# Patient Record
Sex: MALE | Race: WHITE | NOT HISPANIC OR LATINO | URBAN - METROPOLITAN AREA
[De-identification: names, ages, dates, MRNs, and addresses within clinical notes are randomized per-mention and may not be internally consistent; named-entity substitution may affect disease eponyms.]

---

## 2024-10-29 ENCOUNTER — EMERGENCY (EMERGENCY)
Facility: HOSPITAL | Age: 39
LOS: 0 days | Discharge: ANOTHER HOSPITAL | End: 2024-10-29
Attending: STUDENT IN AN ORGANIZED HEALTH CARE EDUCATION/TRAINING PROGRAM
Payer: COMMERCIAL

## 2024-10-29 ENCOUNTER — INPATIENT (INPATIENT)
Facility: HOSPITAL | Age: 39
LOS: 12 days | Discharge: NURSING, REHAB OR LONG-TERM CARE | DRG: 998 | End: 2024-11-11
Attending: THORACIC SURGERY (CARDIOTHORACIC VASCULAR SURGERY) | Admitting: THORACIC SURGERY (CARDIOTHORACIC VASCULAR SURGERY)
Payer: COMMERCIAL

## 2024-10-29 ENCOUNTER — NON-APPOINTMENT (OUTPATIENT)
Age: 39
End: 2024-10-29

## 2024-10-29 ENCOUNTER — APPOINTMENT (OUTPATIENT)
Dept: CARDIOTHORACIC SURGERY | Facility: HOSPITAL | Age: 39
End: 2024-10-29

## 2024-10-29 ENCOUNTER — RESULT REVIEW (OUTPATIENT)
Age: 39
End: 2024-10-29

## 2024-10-29 VITALS
DIASTOLIC BLOOD PRESSURE: 60 MMHG | SYSTOLIC BLOOD PRESSURE: 128 MMHG | RESPIRATION RATE: 18 BRPM | HEART RATE: 94 BPM | OXYGEN SATURATION: 93 %

## 2024-10-29 VITALS
HEART RATE: 96 BPM | SYSTOLIC BLOOD PRESSURE: 177 MMHG | RESPIRATION RATE: 18 BRPM | TEMPERATURE: 99 F | OXYGEN SATURATION: 99 % | DIASTOLIC BLOOD PRESSURE: 63 MMHG

## 2024-10-29 VITALS — HEART RATE: 72 BPM

## 2024-10-29 DIAGNOSIS — I71.00 DISSECTION OF UNSPECIFIED SITE OF AORTA: ICD-10-CM

## 2024-10-29 DIAGNOSIS — I71.0 DISSECTION OF AORTA: ICD-10-CM

## 2024-10-29 DIAGNOSIS — R07.89 OTHER CHEST PAIN: ICD-10-CM

## 2024-10-29 LAB
ABO RH CONFIRMATION: SIGNIFICANT CHANGE UP
ALBUMIN SERPL ELPH-MCNC: 3.7 G/DL — SIGNIFICANT CHANGE UP (ref 3.3–5)
ALP SERPL-CCNC: 91 U/L — SIGNIFICANT CHANGE UP (ref 40–120)
ALT FLD-CCNC: 49 U/L — SIGNIFICANT CHANGE UP (ref 12–78)
ANION GAP SERPL CALC-SCNC: 9 MMOL/L — SIGNIFICANT CHANGE UP (ref 5–17)
APTT BLD: 32.3 SEC — SIGNIFICANT CHANGE UP (ref 24.5–35.6)
AST SERPL-CCNC: 25 U/L — SIGNIFICANT CHANGE UP (ref 15–37)
BASE EXCESS BLDA CALC-SCNC: -1.6 MMOL/L — SIGNIFICANT CHANGE UP (ref -2–3)
BASE EXCESS BLDA CALC-SCNC: -1.8 MMOL/L — SIGNIFICANT CHANGE UP (ref -2–3)
BASE EXCESS BLDA CALC-SCNC: -2.2 MMOL/L — LOW (ref -2–3)
BASE EXCESS BLDA CALC-SCNC: -3.7 MMOL/L — LOW (ref -2–3)
BASE EXCESS BLDA CALC-SCNC: -4.4 MMOL/L — LOW (ref -2–3)
BASE EXCESS BLDA CALC-SCNC: -5.1 MMOL/L — LOW (ref -2–3)
BASE EXCESS BLDA CALC-SCNC: -5.9 MMOL/L — LOW (ref -2–3)
BASE EXCESS BLDA CALC-SCNC: -6.5 MMOL/L — LOW (ref -2–3)
BASE EXCESS BLDA CALC-SCNC: -6.6 MMOL/L — LOW (ref -2–3)
BASE EXCESS BLDA CALC-SCNC: -8.3 MMOL/L — LOW (ref -2–3)
BASE EXCESS BLDA CALC-SCNC: -8.8 MMOL/L — LOW (ref -2–3)
BASE EXCESS BLDA CALC-SCNC: -9.6 MMOL/L — LOW (ref -2–3)
BASE EXCESS BLDV CALC-SCNC: -2 MMOL/L — SIGNIFICANT CHANGE UP (ref -2–3)
BASE EXCESS BLDV CALC-SCNC: -3.8 MMOL/L — LOW (ref -2–3)
BASE EXCESS BLDV CALC-SCNC: -4 MMOL/L — LOW (ref -2–3)
BASE EXCESS BLDV CALC-SCNC: -5.3 MMOL/L — LOW (ref -2–3)
BASE EXCESS BLDV CALC-SCNC: -5.9 MMOL/L — LOW (ref -2–3)
BASE EXCESS BLDV CALC-SCNC: -7.3 MMOL/L — LOW (ref -2–3)
BASE EXCESS BLDV CALC-SCNC: -8.1 MMOL/L — LOW (ref -2–3)
BASE EXCESS BLDV CALC-SCNC: -9.2 MMOL/L — LOW (ref -2–3)
BASOPHILS # BLD AUTO: 0 K/UL — SIGNIFICANT CHANGE UP (ref 0–0.2)
BASOPHILS NFR BLD AUTO: 0 % — SIGNIFICANT CHANGE UP (ref 0–2)
BILIRUB SERPL-MCNC: 0.7 MG/DL — SIGNIFICANT CHANGE UP (ref 0.2–1.2)
BLD GP AB SCN SERPL QL: SIGNIFICANT CHANGE UP
BLD GP AB SCN SERPL QL: SIGNIFICANT CHANGE UP
BUN SERPL-MCNC: 22 MG/DL — SIGNIFICANT CHANGE UP (ref 7–23)
CA-I BLDA-SCNC: 0.71 MMOL/L — LOW (ref 1.15–1.33)
CA-I BLDA-SCNC: 0.81 MMOL/L — LOW (ref 1.15–1.33)
CA-I BLDA-SCNC: 0.82 MMOL/L — LOW (ref 1.15–1.33)
CA-I BLDA-SCNC: 0.93 MMOL/L — LOW (ref 1.15–1.33)
CA-I BLDA-SCNC: 0.97 MMOL/L — LOW (ref 1.15–1.33)
CA-I BLDA-SCNC: 0.98 MMOL/L — LOW (ref 1.15–1.33)
CA-I BLDA-SCNC: 0.98 MMOL/L — LOW (ref 1.15–1.33)
CA-I BLDA-SCNC: 1 MMOL/L — LOW (ref 1.15–1.33)
CA-I BLDA-SCNC: 1.03 MMOL/L — LOW (ref 1.15–1.33)
CA-I BLDA-SCNC: 1.11 MMOL/L — LOW (ref 1.15–1.33)
CA-I BLDA-SCNC: 1.17 MMOL/L — SIGNIFICANT CHANGE UP (ref 1.15–1.33)
CA-I BLDA-SCNC: 1.45 MMOL/L — HIGH (ref 1.15–1.33)
CA-I SERPL-SCNC: 0.72 MMOL/L — LOW (ref 1.15–1.33)
CA-I SERPL-SCNC: 0.83 MMOL/L — LOW (ref 1.15–1.33)
CA-I SERPL-SCNC: 0.85 MMOL/L — LOW (ref 1.15–1.33)
CA-I SERPL-SCNC: 0.91 MMOL/L — LOW (ref 1.15–1.33)
CA-I SERPL-SCNC: 0.96 MMOL/L — LOW (ref 1.15–1.33)
CA-I SERPL-SCNC: 0.96 MMOL/L — LOW (ref 1.15–1.33)
CA-I SERPL-SCNC: 0.98 MMOL/L — LOW (ref 1.15–1.33)
CA-I SERPL-SCNC: 0.99 MMOL/L — LOW (ref 1.15–1.33)
CALCIUM SERPL-MCNC: 9 MG/DL — SIGNIFICANT CHANGE UP (ref 8.5–10.1)
CHLORIDE BLDA-SCNC: 101 MMOL/L — SIGNIFICANT CHANGE UP (ref 96–108)
CHLORIDE BLDA-SCNC: 102 MMOL/L — SIGNIFICANT CHANGE UP (ref 96–108)
CHLORIDE BLDA-SCNC: 103 MMOL/L — SIGNIFICANT CHANGE UP (ref 96–108)
CHLORIDE BLDA-SCNC: 103 MMOL/L — SIGNIFICANT CHANGE UP (ref 96–108)
CHLORIDE BLDA-SCNC: 104 MMOL/L — SIGNIFICANT CHANGE UP (ref 96–108)
CHLORIDE BLDA-SCNC: 104 MMOL/L — SIGNIFICANT CHANGE UP (ref 96–108)
CHLORIDE BLDA-SCNC: 107 MMOL/L — SIGNIFICANT CHANGE UP (ref 96–108)
CHLORIDE BLDA-SCNC: 109 MMOL/L — HIGH (ref 96–108)
CHLORIDE BLDA-SCNC: 110 MMOL/L — HIGH (ref 96–108)
CHLORIDE BLDA-SCNC: 112 MMOL/L — HIGH (ref 96–108)
CHLORIDE BLDV-SCNC: 102 MMOL/L — SIGNIFICANT CHANGE UP (ref 96–108)
CHLORIDE BLDV-SCNC: 103 MMOL/L — SIGNIFICANT CHANGE UP (ref 96–108)
CHLORIDE BLDV-SCNC: 106 MMOL/L — SIGNIFICANT CHANGE UP (ref 96–108)
CHLORIDE BLDV-SCNC: 107 MMOL/L — SIGNIFICANT CHANGE UP (ref 96–108)
CHLORIDE BLDV-SCNC: 109 MMOL/L — HIGH (ref 96–108)
CHLORIDE BLDV-SCNC: 109 MMOL/L — HIGH (ref 96–108)
CHLORIDE SERPL-SCNC: 106 MMOL/L — SIGNIFICANT CHANGE UP (ref 96–108)
CO2 SERPL-SCNC: 23 MMOL/L — SIGNIFICANT CHANGE UP (ref 22–31)
COHGB MFR BLDA: 1.4 % — SIGNIFICANT CHANGE UP
COHGB MFR BLDA: 1.5 % — SIGNIFICANT CHANGE UP
COHGB MFR BLDA: 1.6 % — SIGNIFICANT CHANGE UP
COHGB MFR BLDA: 1.7 % — SIGNIFICANT CHANGE UP
COHGB MFR BLDA: 1.7 % — SIGNIFICANT CHANGE UP
COHGB MFR BLDA: 1.8 % — SIGNIFICANT CHANGE UP
COHGB MFR BLDA: 1.9 % — SIGNIFICANT CHANGE UP
COHGB MFR BLDV: 1.5 % — SIGNIFICANT CHANGE UP
COHGB MFR BLDV: 1.5 % — SIGNIFICANT CHANGE UP
COHGB MFR BLDV: 1.7 % — SIGNIFICANT CHANGE UP
COHGB MFR BLDV: 1.8 % — SIGNIFICANT CHANGE UP
COHGB MFR BLDV: 1.8 % — SIGNIFICANT CHANGE UP
COHGB MFR BLDV: 1.9 % — SIGNIFICANT CHANGE UP
COHGB MFR BLDV: 2 % — SIGNIFICANT CHANGE UP
COHGB MFR BLDV: 2 % — SIGNIFICANT CHANGE UP
CREAT SERPL-MCNC: 1.75 MG/DL — HIGH (ref 0.5–1.3)
EGFR: 51 ML/MIN/1.73M2 — LOW
EOSINOPHIL # BLD AUTO: 0 K/UL — SIGNIFICANT CHANGE UP (ref 0–0.5)
EOSINOPHIL NFR BLD AUTO: 0 % — SIGNIFICANT CHANGE UP (ref 0–6)
GAS PNL BLDA: SIGNIFICANT CHANGE UP
GAS PNL BLDV: 131 MMOL/L — LOW (ref 136–145)
GAS PNL BLDV: 132 MMOL/L — LOW (ref 136–145)
GAS PNL BLDV: 133 MMOL/L — LOW (ref 136–145)
GAS PNL BLDV: 134 MMOL/L — LOW (ref 136–145)
GAS PNL BLDV: 135 MMOL/L — LOW (ref 136–145)
GAS PNL BLDV: 138 MMOL/L — SIGNIFICANT CHANGE UP (ref 136–145)
GAS PNL BLDV: 141 MMOL/L — SIGNIFICANT CHANGE UP (ref 136–145)
GAS PNL BLDV: 142 MMOL/L — SIGNIFICANT CHANGE UP (ref 136–145)
GLUCOSE BLDA-MCNC: 118 MG/DL — HIGH (ref 70–99)
GLUCOSE BLDA-MCNC: 131 MG/DL — HIGH (ref 70–99)
GLUCOSE BLDA-MCNC: 133 MG/DL — HIGH (ref 70–99)
GLUCOSE BLDA-MCNC: 136 MG/DL — HIGH (ref 70–99)
GLUCOSE BLDA-MCNC: 145 MG/DL — HIGH (ref 70–99)
GLUCOSE BLDA-MCNC: 159 MG/DL — HIGH (ref 70–99)
GLUCOSE BLDA-MCNC: 162 MG/DL — HIGH (ref 70–99)
GLUCOSE BLDA-MCNC: 178 MG/DL — HIGH (ref 70–99)
GLUCOSE BLDA-MCNC: 178 MG/DL — HIGH (ref 70–99)
GLUCOSE BLDA-MCNC: 186 MG/DL — HIGH (ref 70–99)
GLUCOSE BLDA-MCNC: 194 MG/DL — HIGH (ref 70–99)
GLUCOSE BLDA-MCNC: 197 MG/DL — HIGH (ref 70–99)
GLUCOSE BLDV-MCNC: 131 MG/DL — HIGH (ref 70–99)
GLUCOSE BLDV-MCNC: 134 MG/DL — HIGH (ref 70–99)
GLUCOSE BLDV-MCNC: 140 MG/DL — HIGH (ref 70–99)
GLUCOSE BLDV-MCNC: 160 MG/DL — HIGH (ref 70–99)
GLUCOSE BLDV-MCNC: 168 MG/DL — HIGH (ref 70–99)
GLUCOSE BLDV-MCNC: 180 MG/DL — HIGH (ref 70–99)
GLUCOSE BLDV-MCNC: 181 MG/DL — HIGH (ref 70–99)
GLUCOSE BLDV-MCNC: 188 MG/DL — HIGH (ref 70–99)
GLUCOSE SERPL-MCNC: 120 MG/DL — HIGH (ref 70–99)
HCO3 BLDA-SCNC: 18 MMOL/L — LOW (ref 21–28)
HCO3 BLDA-SCNC: 18 MMOL/L — LOW (ref 21–28)
HCO3 BLDA-SCNC: 19 MMOL/L — LOW (ref 21–28)
HCO3 BLDA-SCNC: 20 MMOL/L — LOW (ref 21–28)
HCO3 BLDA-SCNC: 20 MMOL/L — LOW (ref 21–28)
HCO3 BLDA-SCNC: 21 MMOL/L — SIGNIFICANT CHANGE UP (ref 21–28)
HCO3 BLDA-SCNC: 22 MMOL/L — SIGNIFICANT CHANGE UP (ref 21–28)
HCO3 BLDA-SCNC: 22 MMOL/L — SIGNIFICANT CHANGE UP (ref 21–28)
HCO3 BLDA-SCNC: 23 MMOL/L — SIGNIFICANT CHANGE UP (ref 21–28)
HCO3 BLDA-SCNC: 23 MMOL/L — SIGNIFICANT CHANGE UP (ref 21–28)
HCO3 BLDA-SCNC: 25 MMOL/L — SIGNIFICANT CHANGE UP (ref 21–28)
HCO3 BLDA-SCNC: 25 MMOL/L — SIGNIFICANT CHANGE UP (ref 21–28)
HCO3 BLDV-SCNC: 19 MMOL/L — LOW (ref 22–29)
HCO3 BLDV-SCNC: 19 MMOL/L — LOW (ref 22–29)
HCO3 BLDV-SCNC: 20 MMOL/L — LOW (ref 22–29)
HCO3 BLDV-SCNC: 21 MMOL/L — LOW (ref 22–29)
HCO3 BLDV-SCNC: 22 MMOL/L — SIGNIFICANT CHANGE UP (ref 22–29)
HCO3 BLDV-SCNC: 23 MMOL/L — SIGNIFICANT CHANGE UP (ref 22–29)
HCO3 BLDV-SCNC: 24 MMOL/L — SIGNIFICANT CHANGE UP (ref 22–29)
HCO3 BLDV-SCNC: 25 MMOL/L — SIGNIFICANT CHANGE UP (ref 22–29)
HCT VFR BLD CALC: 34.9 % — LOW (ref 39–50)
HCT VFR BLD CALC: 43.2 % — SIGNIFICANT CHANGE UP (ref 39–50)
HCT VFR BLDA CALC: 29 % — SIGNIFICANT CHANGE UP
HCT VFR BLDA CALC: 30 % — SIGNIFICANT CHANGE UP
HCT VFR BLDA CALC: 32 % — SIGNIFICANT CHANGE UP
HCT VFR BLDA CALC: 33 % — SIGNIFICANT CHANGE UP
HCT VFR BLDA CALC: 33 % — SIGNIFICANT CHANGE UP
HCT VFR BLDA CALC: 34 % — SIGNIFICANT CHANGE UP
HCT VFR BLDA CALC: 35 % — SIGNIFICANT CHANGE UP
HCT VFR BLDA CALC: 39 % — SIGNIFICANT CHANGE UP
HCT VFR BLDA CALC: 44 % — SIGNIFICANT CHANGE UP
HGB BLD CALC-MCNC: 10.7 G/DL — LOW (ref 12.6–17.4)
HGB BLD CALC-MCNC: 10.9 G/DL — LOW (ref 12.6–17.4)
HGB BLD CALC-MCNC: 11 G/DL — LOW (ref 12.6–17.4)
HGB BLD CALC-MCNC: 11.2 G/DL — LOW (ref 12.6–17.4)
HGB BLD CALC-MCNC: 11.4 G/DL — LOW (ref 12.6–17.4)
HGB BLD CALC-MCNC: 11.5 G/DL — LOW (ref 12.6–17.4)
HGB BLD CALC-MCNC: 11.6 G/DL — LOW (ref 12.6–17.4)
HGB BLD CALC-MCNC: 11.8 G/DL — LOW (ref 12.6–17.4)
HGB BLD-MCNC: 11.9 G/DL — LOW (ref 13–17)
HGB BLD-MCNC: 14.5 G/DL — SIGNIFICANT CHANGE UP (ref 13–17)
HGB BLDA-MCNC: 10 G/DL — LOW (ref 12.6–17.4)
HGB BLDA-MCNC: 10.6 G/DL — LOW (ref 12.6–17.4)
HGB BLDA-MCNC: 10.7 G/DL — LOW (ref 12.6–17.4)
HGB BLDA-MCNC: 10.8 G/DL — LOW (ref 12.6–17.4)
HGB BLDA-MCNC: 11.3 G/DL — LOW (ref 12.6–17.4)
HGB BLDA-MCNC: 11.4 G/DL — LOW (ref 12.6–17.4)
HGB BLDA-MCNC: 11.6 G/DL — LOW (ref 12.6–17.4)
HGB BLDA-MCNC: 11.6 G/DL — LOW (ref 12.6–17.4)
HGB BLDA-MCNC: 11.7 G/DL — LOW (ref 12.6–17.4)
HGB BLDA-MCNC: 13 G/DL — SIGNIFICANT CHANGE UP (ref 12.6–17.4)
HGB BLDA-MCNC: 14.5 G/DL — SIGNIFICANT CHANGE UP (ref 12.6–17.4)
HGB BLDA-MCNC: 9.6 G/DL — LOW (ref 12.6–17.4)
INR BLD: 0.91 RATIO — SIGNIFICANT CHANGE UP (ref 0.85–1.16)
LACTATE BLDA-MCNC: 2.8 MMOL/L — HIGH (ref 0.5–2)
LACTATE BLDA-MCNC: 2.8 MMOL/L — HIGH (ref 0.5–2)
LACTATE BLDA-MCNC: 3.1 MMOL/L — HIGH (ref 0.5–2)
LACTATE BLDA-MCNC: 3.6 MMOL/L — HIGH (ref 0.5–2)
LACTATE BLDA-MCNC: 3.8 MMOL/L — HIGH (ref 0.5–2)
LACTATE BLDA-MCNC: 4.1 MMOL/L — CRITICAL HIGH (ref 0.5–2)
LACTATE BLDA-MCNC: 4.4 MMOL/L — CRITICAL HIGH (ref 0.5–2)
LACTATE BLDA-MCNC: 4.6 MMOL/L — CRITICAL HIGH (ref 0.5–2)
LACTATE BLDA-MCNC: 5.1 MMOL/L — CRITICAL HIGH (ref 0.5–2)
LACTATE BLDA-MCNC: 5.1 MMOL/L — CRITICAL HIGH (ref 0.5–2)
LACTATE BLDA-MCNC: 5.8 MMOL/L — CRITICAL HIGH (ref 0.5–2)
LACTATE BLDA-MCNC: 6.4 MMOL/L — CRITICAL HIGH (ref 0.5–2)
LACTATE BLDV-MCNC: 2.8 MMOL/L — HIGH (ref 0.5–2)
LACTATE BLDV-MCNC: 3.8 MMOL/L — HIGH (ref 0.5–2)
LACTATE BLDV-MCNC: 3.9 MMOL/L — HIGH (ref 0.5–2)
LACTATE BLDV-MCNC: 4.1 MMOL/L — CRITICAL HIGH (ref 0.5–2)
LACTATE BLDV-MCNC: 4.4 MMOL/L — CRITICAL HIGH (ref 0.5–2)
LACTATE BLDV-MCNC: 4.4 MMOL/L — CRITICAL HIGH (ref 0.5–2)
LACTATE BLDV-MCNC: 5.1 MMOL/L — CRITICAL HIGH (ref 0.5–2)
LACTATE BLDV-MCNC: 5.2 MMOL/L — CRITICAL HIGH (ref 0.5–2)
LYMPHOCYTES # BLD AUTO: 1.84 K/UL — SIGNIFICANT CHANGE UP (ref 1–3.3)
LYMPHOCYTES # BLD AUTO: 9 % — LOW (ref 13–44)
MANUAL SMEAR VERIFICATION: SIGNIFICANT CHANGE UP
MCHC RBC-ENTMCNC: 28.4 PG — SIGNIFICANT CHANGE UP (ref 27–34)
MCHC RBC-ENTMCNC: 28.7 PG — SIGNIFICANT CHANGE UP (ref 27–34)
MCHC RBC-ENTMCNC: 33.6 GM/DL — SIGNIFICANT CHANGE UP (ref 32–36)
MCHC RBC-ENTMCNC: 34.1 G/DL — SIGNIFICANT CHANGE UP (ref 32–36)
MCV RBC AUTO: 83.3 FL — SIGNIFICANT CHANGE UP (ref 80–100)
MCV RBC AUTO: 85.4 FL — SIGNIFICANT CHANGE UP (ref 80–100)
METHGB MFR BLDA: 0.5 % — SIGNIFICANT CHANGE UP
METHGB MFR BLDA: 0.5 % — SIGNIFICANT CHANGE UP
METHGB MFR BLDA: 0.6 % — SIGNIFICANT CHANGE UP
METHGB MFR BLDA: 0.6 % — SIGNIFICANT CHANGE UP
METHGB MFR BLDA: 0.7 % — SIGNIFICANT CHANGE UP
METHGB MFR BLDA: 0.8 % — SIGNIFICANT CHANGE UP
METHGB MFR BLDA: 0.8 % — SIGNIFICANT CHANGE UP
METHGB MFR BLDA: 0.9 % — SIGNIFICANT CHANGE UP
METHGB MFR BLDA: 1 % — SIGNIFICANT CHANGE UP
METHGB MFR BLDA: 1.4 % — SIGNIFICANT CHANGE UP
METHGB MFR BLDV: 0.6 % — SIGNIFICANT CHANGE UP
METHGB MFR BLDV: 0.6 % — SIGNIFICANT CHANGE UP
METHGB MFR BLDV: 0.8 % — SIGNIFICANT CHANGE UP
METHGB MFR BLDV: 0.9 % — SIGNIFICANT CHANGE UP
METHGB MFR BLDV: 1 % — SIGNIFICANT CHANGE UP
METHGB MFR BLDV: 1 % — SIGNIFICANT CHANGE UP
MONOCYTES # BLD AUTO: 2.46 K/UL — HIGH (ref 0–0.9)
MONOCYTES NFR BLD AUTO: 12 % — SIGNIFICANT CHANGE UP (ref 2–14)
NEUTROPHILS # BLD AUTO: 15.75 K/UL — HIGH (ref 1.8–7.4)
NEUTROPHILS NFR BLD AUTO: 77 % — SIGNIFICANT CHANGE UP (ref 43–77)
NRBC # BLD: 0 /100 WBCS — SIGNIFICANT CHANGE UP (ref 0–0)
NRBC # BLD: SIGNIFICANT CHANGE UP /100 WBCS (ref 0–0)
OXYHGB MFR BLDA: 97 % — HIGH (ref 90–95)
OXYHGB MFR BLDA: 98 % — HIGH (ref 90–95)
PCO2 BLDA: 37 MMHG — SIGNIFICANT CHANGE UP (ref 35–48)
PCO2 BLDA: 38 MMHG — SIGNIFICANT CHANGE UP (ref 35–48)
PCO2 BLDA: 38 MMHG — SIGNIFICANT CHANGE UP (ref 35–48)
PCO2 BLDA: 41 MMHG — SIGNIFICANT CHANGE UP (ref 35–48)
PCO2 BLDA: 43 MMHG — SIGNIFICANT CHANGE UP (ref 35–48)
PCO2 BLDA: 43 MMHG — SIGNIFICANT CHANGE UP (ref 35–48)
PCO2 BLDA: 46 MMHG — SIGNIFICANT CHANGE UP (ref 35–48)
PCO2 BLDA: 47 MMHG — SIGNIFICANT CHANGE UP (ref 35–48)
PCO2 BLDA: 49 MMHG — HIGH (ref 35–48)
PCO2 BLDA: 49 MMHG — HIGH (ref 35–48)
PCO2 BLDA: 52 MMHG — HIGH (ref 35–48)
PCO2 BLDA: 53 MMHG — HIGH (ref 35–48)
PCO2 BLDV: 41 MMHG — LOW (ref 42–55)
PCO2 BLDV: 43 MMHG — SIGNIFICANT CHANGE UP (ref 42–55)
PCO2 BLDV: 47 MMHG — SIGNIFICANT CHANGE UP (ref 42–55)
PCO2 BLDV: 48 MMHG — SIGNIFICANT CHANGE UP (ref 42–55)
PCO2 BLDV: 50 MMHG — SIGNIFICANT CHANGE UP (ref 42–55)
PCO2 BLDV: 53 MMHG — SIGNIFICANT CHANGE UP (ref 42–55)
PCO2 BLDV: 55 MMHG — SIGNIFICANT CHANGE UP (ref 42–55)
PCO2 BLDV: 56 MMHG — HIGH (ref 42–55)
PH BLDA: 7.23 — LOW (ref 7.35–7.45)
PH BLDA: 7.25 — LOW (ref 7.35–7.45)
PH BLDA: 7.25 — LOW (ref 7.35–7.45)
PH BLDA: 7.27 — LOW (ref 7.35–7.45)
PH BLDA: 7.28 — LOW (ref 7.35–7.45)
PH BLDA: 7.29 — LOW (ref 7.35–7.45)
PH BLDA: 7.29 — LOW (ref 7.35–7.45)
PH BLDA: 7.32 — LOW (ref 7.35–7.45)
PH BLDA: 7.33 — LOW (ref 7.35–7.45)
PH BLDA: 7.36 — SIGNIFICANT CHANGE UP (ref 7.35–7.45)
PH BLDV: 7.2 — LOW (ref 7.32–7.43)
PH BLDV: 7.24 — LOW (ref 7.32–7.43)
PH BLDV: 7.24 — LOW (ref 7.32–7.43)
PH BLDV: 7.25 — LOW (ref 7.32–7.43)
PH BLDV: 7.25 — LOW (ref 7.32–7.43)
PH BLDV: 7.26 — LOW (ref 7.32–7.43)
PH BLDV: 7.27 — LOW (ref 7.32–7.43)
PH BLDV: 7.29 — LOW (ref 7.32–7.43)
PLAT MORPH BLD: NORMAL — SIGNIFICANT CHANGE UP
PLATELET # BLD AUTO: 234 K/UL — SIGNIFICANT CHANGE UP (ref 150–400)
PLATELET # BLD AUTO: 269 K/UL — SIGNIFICANT CHANGE UP (ref 150–400)
PO2 BLDA: 150 MMHG — HIGH (ref 83–108)
PO2 BLDA: 161 MMHG — HIGH (ref 83–108)
PO2 BLDA: 274 MMHG — HIGH (ref 83–108)
PO2 BLDA: 305 MMHG — HIGH (ref 83–108)
PO2 BLDA: 352 MMHG — HIGH (ref 83–108)
PO2 BLDA: 352 MMHG — HIGH (ref 83–108)
PO2 BLDA: 421 MMHG — HIGH (ref 83–108)
PO2 BLDA: 423 MMHG — HIGH (ref 83–108)
PO2 BLDA: >496 MMHG — HIGH (ref 83–108)
PO2 BLDV: 113 MMHG — HIGH (ref 25–45)
PO2 BLDV: 183 MMHG — HIGH (ref 25–45)
PO2 BLDV: 357 MMHG — HIGH (ref 25–45)
PO2 BLDV: 46 MMHG — HIGH (ref 25–45)
PO2 BLDV: 47 MMHG — HIGH (ref 25–45)
PO2 BLDV: 49 MMHG — HIGH (ref 25–45)
PO2 BLDV: 50 MMHG — HIGH (ref 25–45)
PO2 BLDV: 60 MMHG — HIGH (ref 25–45)
POTASSIUM BLDA-SCNC: 4.2 MMOL/L — SIGNIFICANT CHANGE UP (ref 3.5–5.1)
POTASSIUM BLDA-SCNC: 4.2 MMOL/L — SIGNIFICANT CHANGE UP (ref 3.5–5.1)
POTASSIUM BLDA-SCNC: 4.8 MMOL/L — SIGNIFICANT CHANGE UP (ref 3.5–5.1)
POTASSIUM BLDA-SCNC: 5 MMOL/L — SIGNIFICANT CHANGE UP (ref 3.5–5.1)
POTASSIUM BLDA-SCNC: 5.1 MMOL/L — SIGNIFICANT CHANGE UP (ref 3.5–5.1)
POTASSIUM BLDA-SCNC: 5.9 MMOL/L — HIGH (ref 3.5–5.1)
POTASSIUM BLDA-SCNC: 6 MMOL/L — HIGH (ref 3.5–5.1)
POTASSIUM BLDA-SCNC: 6.1 MMOL/L — HIGH (ref 3.5–5.1)
POTASSIUM BLDA-SCNC: 6.3 MMOL/L — CRITICAL HIGH (ref 3.5–5.1)
POTASSIUM BLDA-SCNC: 6.5 MMOL/L — CRITICAL HIGH (ref 3.5–5.1)
POTASSIUM BLDA-SCNC: 6.6 MMOL/L — CRITICAL HIGH (ref 3.5–5.1)
POTASSIUM BLDA-SCNC: 6.9 MMOL/L — CRITICAL HIGH (ref 3.5–5.1)
POTASSIUM BLDV-SCNC: 5 MMOL/L — SIGNIFICANT CHANGE UP (ref 3.5–5.1)
POTASSIUM BLDV-SCNC: 6 MMOL/L — HIGH (ref 3.5–5.1)
POTASSIUM BLDV-SCNC: 6.1 MMOL/L — HIGH (ref 3.5–5.1)
POTASSIUM BLDV-SCNC: 6.1 MMOL/L — HIGH (ref 3.5–5.1)
POTASSIUM BLDV-SCNC: 6.3 MMOL/L — CRITICAL HIGH (ref 3.5–5.1)
POTASSIUM BLDV-SCNC: 6.6 MMOL/L — CRITICAL HIGH (ref 3.5–5.1)
POTASSIUM BLDV-SCNC: 6.7 MMOL/L — CRITICAL HIGH (ref 3.5–5.1)
POTASSIUM BLDV-SCNC: 6.9 MMOL/L — CRITICAL HIGH (ref 3.5–5.1)
POTASSIUM SERPL-MCNC: 3.8 MMOL/L — SIGNIFICANT CHANGE UP (ref 3.5–5.3)
POTASSIUM SERPL-SCNC: 3.8 MMOL/L — SIGNIFICANT CHANGE UP (ref 3.5–5.3)
PROT SERPL-MCNC: 7.4 GM/DL — SIGNIFICANT CHANGE UP (ref 6–8.3)
PROTHROM AB SERPL-ACNC: 10.7 SEC — SIGNIFICANT CHANGE UP (ref 9.9–13.4)
RBC # BLD: 4.19 M/UL — LOW (ref 4.2–5.8)
RBC # BLD: 5.06 M/UL — SIGNIFICANT CHANGE UP (ref 4.2–5.8)
RBC # FLD: 13.9 % — SIGNIFICANT CHANGE UP (ref 10.3–14.5)
RBC # FLD: 14.2 % — SIGNIFICANT CHANGE UP (ref 10.3–14.5)
RBC BLD AUTO: NORMAL — SIGNIFICANT CHANGE UP
SAO2 % BLDA: 100 % — HIGH (ref 94–98)
SAO2 % BLDA: 99.5 % — HIGH (ref 94–98)
SAO2 % BLDA: 99.9 % — HIGH (ref 94–98)
SAO2 % BLDV: 100 % — HIGH (ref 67–88)
SAO2 % BLDV: 100 % — HIGH (ref 67–88)
SAO2 % BLDV: 76.3 % — SIGNIFICANT CHANGE UP (ref 67–88)
SAO2 % BLDV: 79.1 % — SIGNIFICANT CHANGE UP (ref 67–88)
SAO2 % BLDV: 79.4 % — SIGNIFICANT CHANGE UP (ref 67–88)
SAO2 % BLDV: 80.6 % — SIGNIFICANT CHANGE UP (ref 67–88)
SAO2 % BLDV: 90.9 % — HIGH (ref 67–88)
SAO2 % BLDV: 99.9 % — HIGH (ref 67–88)
SODIUM BLDA-SCNC: 131 MMOL/L — LOW (ref 136–145)
SODIUM BLDA-SCNC: 132 MMOL/L — LOW (ref 136–145)
SODIUM BLDA-SCNC: 133 MMOL/L — LOW (ref 136–145)
SODIUM BLDA-SCNC: 133 MMOL/L — LOW (ref 136–145)
SODIUM BLDA-SCNC: 134 MMOL/L — LOW (ref 136–145)
SODIUM BLDA-SCNC: 135 MMOL/L — LOW (ref 136–145)
SODIUM BLDA-SCNC: 137 MMOL/L — SIGNIFICANT CHANGE UP (ref 136–145)
SODIUM BLDA-SCNC: 139 MMOL/L — SIGNIFICANT CHANGE UP (ref 136–145)
SODIUM BLDA-SCNC: 140 MMOL/L — SIGNIFICANT CHANGE UP (ref 136–145)
SODIUM BLDA-SCNC: 141 MMOL/L — SIGNIFICANT CHANGE UP (ref 136–145)
SODIUM BLDA-SCNC: 143 MMOL/L — SIGNIFICANT CHANGE UP (ref 136–145)
SODIUM BLDA-SCNC: 143 MMOL/L — SIGNIFICANT CHANGE UP (ref 136–145)
SODIUM SERPL-SCNC: 138 MMOL/L — SIGNIFICANT CHANGE UP (ref 135–145)
TROPONIN I, HIGH SENSITIVITY RESULT: 463.51 NG/L — HIGH
VARIANT LYMPHS # BLD: 2 % — SIGNIFICANT CHANGE UP (ref 0–6)
WBC # BLD: 20.46 K/UL — HIGH (ref 3.8–10.5)
WBC # BLD: 24.73 K/UL — HIGH (ref 3.8–10.5)
WBC # FLD AUTO: 20.46 K/UL — HIGH (ref 3.8–10.5)
WBC # FLD AUTO: 24.73 K/UL — HIGH (ref 3.8–10.5)

## 2024-10-29 PROCEDURE — 99291 CRITICAL CARE FIRST HOUR: CPT | Mod: 25

## 2024-10-29 PROCEDURE — 71275 CT ANGIOGRAPHY CHEST: CPT | Mod: 26,MC

## 2024-10-29 PROCEDURE — 93010 ELECTROCARDIOGRAM REPORT: CPT

## 2024-10-29 PROCEDURE — 85025 COMPLETE CBC W/AUTO DIFF WBC: CPT

## 2024-10-29 PROCEDURE — 96375 TX/PRO/DX INJ NEW DRUG ADDON: CPT | Mod: XU

## 2024-10-29 PROCEDURE — 36415 COLL VENOUS BLD VENIPUNCTURE: CPT

## 2024-10-29 PROCEDURE — 71275 CT ANGIOGRAPHY CHEST: CPT | Mod: MC

## 2024-10-29 PROCEDURE — 96376 TX/PRO/DX INJ SAME DRUG ADON: CPT | Mod: XU

## 2024-10-29 PROCEDURE — 99291 CRITICAL CARE FIRST HOUR: CPT

## 2024-10-29 PROCEDURE — 93005 ELECTROCARDIOGRAM TRACING: CPT

## 2024-10-29 PROCEDURE — 85610 PROTHROMBIN TIME: CPT

## 2024-10-29 PROCEDURE — 33863 ASCENDING AORTIC GRAFT: CPT

## 2024-10-29 PROCEDURE — 88305 TISSUE EXAM BY PATHOLOGIST: CPT | Mod: 26

## 2024-10-29 PROCEDURE — 74174 CTA ABD&PLVS W/CONTRAST: CPT | Mod: MC

## 2024-10-29 PROCEDURE — 86900 BLOOD TYPING SEROLOGIC ABO: CPT

## 2024-10-29 PROCEDURE — 33863 ASCENDING AORTIC GRAFT: CPT | Mod: AS

## 2024-10-29 PROCEDURE — 74174 CTA ABD&PLVS W/CONTRAST: CPT | Mod: 26,MC

## 2024-10-29 PROCEDURE — 85730 THROMBOPLASTIN TIME PARTIAL: CPT

## 2024-10-29 PROCEDURE — 80053 COMPREHEN METABOLIC PANEL: CPT

## 2024-10-29 PROCEDURE — 71045 X-RAY EXAM CHEST 1 VIEW: CPT | Mod: 26,77

## 2024-10-29 PROCEDURE — 71045 X-RAY EXAM CHEST 1 VIEW: CPT | Mod: 26

## 2024-10-29 PROCEDURE — 96374 THER/PROPH/DIAG INJ IV PUSH: CPT | Mod: XU

## 2024-10-29 PROCEDURE — 93010 ELECTROCARDIOGRAM REPORT: CPT | Mod: 77

## 2024-10-29 PROCEDURE — 71045 X-RAY EXAM CHEST 1 VIEW: CPT

## 2024-10-29 PROCEDURE — 84484 ASSAY OF TROPONIN QUANT: CPT

## 2024-10-29 PROCEDURE — 86850 RBC ANTIBODY SCREEN: CPT

## 2024-10-29 PROCEDURE — 86901 BLOOD TYPING SEROLOGIC RH(D): CPT

## 2024-10-29 DEVICE — CANNULA ARTERIAL OPTISITE 20FR X 3/8" VENTED: Type: IMPLANTABLE DEVICE | Status: FUNCTIONAL

## 2024-10-29 DEVICE — LIGATING CLIPS WECK HORIZON MEDIUM (BLUE) 24: Type: IMPLANTABLE DEVICE | Status: FUNCTIONAL

## 2024-10-29 DEVICE — FELT PTFE 6 X 6": Type: IMPLANTABLE DEVICE | Status: FUNCTIONAL

## 2024-10-29 DEVICE — FLOSEAL WITH RECOTHROM THROMBIN 10ML: Type: IMPLANTABLE DEVICE | Status: FUNCTIONAL

## 2024-10-29 DEVICE — TISSEEL 10ML: Type: IMPLANTABLE DEVICE | Status: FUNCTIONAL

## 2024-10-29 DEVICE — INTRODUCER PERCUTANEOUS INSERTION KIT: Type: IMPLANTABLE DEVICE | Status: FUNCTIONAL

## 2024-10-29 DEVICE — CANNULA ANTEGRADE CARDIOPLEGIA 14 GA STRL: Type: IMPLANTABLE DEVICE | Status: FUNCTIONAL

## 2024-10-29 DEVICE — BONE WAX 2.5GM: Type: IMPLANTABLE DEVICE | Status: FUNCTIONAL

## 2024-10-29 DEVICE — BOVINE CONDUIT VALVE AORTIC KONECT 25MM: Type: IMPLANTABLE DEVICE | Status: FUNCTIONAL

## 2024-10-29 DEVICE — COR-KNOT MINI DEVICE COMBO KIT: Type: IMPLANTABLE DEVICE | Status: FUNCTIONAL

## 2024-10-29 DEVICE — KIT CVC 2LUM MAC 9FR CHG: Type: IMPLANTABLE DEVICE | Status: FUNCTIONAL

## 2024-10-29 DEVICE — PACING WIRE ORANGE M-25 WINGED WIRE 37MM X 89MM: Type: IMPLANTABLE DEVICE | Status: FUNCTIONAL

## 2024-10-29 DEVICE — CANNULA ARTERIAL OPTISITE 16FR X 3/8" VENTED: Type: IMPLANTABLE DEVICE | Status: FUNCTIONAL

## 2024-10-29 DEVICE — KIT A-LINE 1LUM 20G X 12CM SAFE KIT: Type: IMPLANTABLE DEVICE | Status: FUNCTIONAL

## 2024-10-29 DEVICE — SHEATH INTRODUCER TERUMO PINNACLE PERIPHERAL 4FR X 10CM X 0.035" MINI WIRE: Type: IMPLANTABLE DEVICE | Status: FUNCTIONAL

## 2024-10-29 DEVICE — CATH THERMAL OXI SWAN GANZ DILUTION 7.5FR: Type: IMPLANTABLE DEVICE | Status: FUNCTIONAL

## 2024-10-29 DEVICE — CANNULA ARTERIAL SOFT-FLOW 24FR EXTENDED VENTED: Type: IMPLANTABLE DEVICE | Status: FUNCTIONAL

## 2024-10-29 DEVICE — CATH VENT VENTRICULAR PVC 18FR X 4.25" TIP PERFORATION: Type: IMPLANTABLE DEVICE | Status: FUNCTIONAL

## 2024-10-29 DEVICE — CANNULA VENOUS 2 STAGE OPEN LIGHTHOUSE TIP 32-40FR X 1/2" NON-VENTED: Type: IMPLANTABLE DEVICE | Status: FUNCTIONAL

## 2024-10-29 DEVICE — BIOGLUE 10ML SYR: Type: IMPLANTABLE DEVICE | Status: FUNCTIONAL

## 2024-10-29 DEVICE — CANNULA RETROGRADE CARDIOPLEGIA SELF-INFLATING 14FR PRE-SHAPED STYLET/HANDLE: Type: IMPLANTABLE DEVICE | Status: FUNCTIONAL

## 2024-10-29 DEVICE — COR-KNOT QUICK LOAD SINGLES: Type: IMPLANTABLE DEVICE | Status: FUNCTIONAL

## 2024-10-29 DEVICE — PATCH PERI GUARD 4X4CM: Type: IMPLANTABLE DEVICE | Status: FUNCTIONAL

## 2024-10-29 DEVICE — PACING WIRE WHITE M-25 WINGED WIRE 37MM X 89MM: Type: IMPLANTABLE DEVICE | Status: FUNCTIONAL

## 2024-10-29 RX ORDER — PROPOFOL 10 MG/ML
35 INJECTION, EMULSION INTRAVENOUS
Qty: 1000 | Refills: 0 | Status: DISCONTINUED | OUTPATIENT
Start: 2024-10-29 | End: 2024-10-30

## 2024-10-29 RX ORDER — ESMOLOL HYDROCHLORIDE IN SODIUM CHLORIDE 20 MG/ML
50 INJECTION INTRAVENOUS
Qty: 2500 | Refills: 0 | Status: DISCONTINUED | OUTPATIENT
Start: 2024-10-29 | End: 2024-10-29

## 2024-10-29 RX ORDER — POTASSIUM CHLORIDE 10 MEQ
10 TABLET, EXTENDED RELEASE ORAL
Refills: 0 | Status: DISCONTINUED | OUTPATIENT
Start: 2024-10-29 | End: 2024-11-01

## 2024-10-29 RX ORDER — ESMOLOL HYDROCHLORIDE IN SODIUM CHLORIDE 20 MG/ML
53500 INJECTION INTRAVENOUS ONCE
Refills: 0 | Status: DISCONTINUED | OUTPATIENT
Start: 2024-10-29 | End: 2024-10-29

## 2024-10-29 RX ORDER — ASCORBIC ACID 500 MG
500 TABLET ORAL
Refills: 0 | Status: COMPLETED | OUTPATIENT
Start: 2024-10-29 | End: 2024-11-03

## 2024-10-29 RX ORDER — SODIUM CHLORIDE 9 MG/ML
1000 INJECTION, SOLUTION INTRAMUSCULAR; INTRAVENOUS; SUBCUTANEOUS
Refills: 0 | Status: DISCONTINUED | OUTPATIENT
Start: 2024-10-29 | End: 2024-11-04

## 2024-10-29 RX ORDER — SENNA 187 MG
2 TABLET ORAL AT BEDTIME
Refills: 0 | Status: DISCONTINUED | OUTPATIENT
Start: 2024-10-30 | End: 2024-11-05

## 2024-10-29 RX ORDER — ACETAMINOPHEN 500 MG
1000 TABLET ORAL EVERY 6 HOURS
Refills: 0 | Status: COMPLETED | OUTPATIENT
Start: 2024-10-29 | End: 2024-10-30

## 2024-10-29 RX ORDER — GABAPENTIN 300 MG/1
100 CAPSULE ORAL EVERY 8 HOURS
Refills: 0 | Status: COMPLETED | OUTPATIENT
Start: 2024-10-29 | End: 2024-11-03

## 2024-10-29 RX ORDER — INSULIN REG, HUM S-S BUFF 100/ML
2 VIAL (ML) INJECTION
Qty: 100 | Refills: 0 | Status: DISCONTINUED | OUTPATIENT
Start: 2024-10-29 | End: 2024-10-31

## 2024-10-29 RX ORDER — VASOPRESSIN IN 0.9% SODIUM CHLORIDE 20 [USP'U]/100ML
0.02 INJECTION INTRAVENOUS
Qty: 40 | Refills: 0 | Status: DISCONTINUED | OUTPATIENT
Start: 2024-10-29 | End: 2024-10-30

## 2024-10-29 RX ORDER — MORPHINE SULFATE 30 MG/1
4 TABLET, FILM COATED, EXTENDED RELEASE ORAL ONCE
Refills: 0 | Status: DISCONTINUED | OUTPATIENT
Start: 2024-10-29 | End: 2024-10-29

## 2024-10-29 RX ORDER — HYDROMORPHONE HCL/0.9% NACL/PF 6 MG/30 ML
0.5 PATIENT CONTROLLED ANALGESIA SYRINGE INTRAVENOUS
Refills: 0 | Status: DISCONTINUED | OUTPATIENT
Start: 2024-10-29 | End: 2024-10-30

## 2024-10-29 RX ORDER — OXYCODONE HYDROCHLORIDE 30 MG/1
10 TABLET ORAL EVERY 4 HOURS
Refills: 0 | Status: DISCONTINUED | OUTPATIENT
Start: 2024-10-29 | End: 2024-11-01

## 2024-10-29 RX ORDER — DEXAMETHASONE 1.5 MG 1.5 MG/1
4 TABLET ORAL EVERY 8 HOURS
Refills: 0 | Status: COMPLETED | OUTPATIENT
Start: 2024-10-29 | End: 2024-10-30

## 2024-10-29 RX ORDER — AMIODARONE HCL 200 MG
400 TABLET ORAL
Refills: 0 | Status: DISCONTINUED | OUTPATIENT
Start: 2024-10-29 | End: 2024-10-31

## 2024-10-29 RX ORDER — LABETALOL HYDROCHLORIDE 200 MG/1
10 TABLET, FILM COATED ORAL ONCE
Refills: 0 | Status: COMPLETED | OUTPATIENT
Start: 2024-10-29 | End: 2024-10-29

## 2024-10-29 RX ORDER — NOREPINEPHRINE BITARTRATE 1 MG/ML
0.05 INJECTION, SOLUTION, CONCENTRATE INTRAVENOUS
Qty: 8 | Refills: 0 | Status: DISCONTINUED | OUTPATIENT
Start: 2024-10-29 | End: 2024-10-30

## 2024-10-29 RX ORDER — CALCIUM GLUCONATE 98 MG/ML
2 INJECTION, SOLUTION INTRAVENOUS ONCE
Refills: 0 | Status: COMPLETED | OUTPATIENT
Start: 2024-10-29 | End: 2024-10-29

## 2024-10-29 RX ORDER — ACETAMINOPHEN 500 MG
975 TABLET ORAL EVERY 6 HOURS
Refills: 0 | Status: DISCONTINUED | OUTPATIENT
Start: 2024-11-02 | End: 2024-11-11

## 2024-10-29 RX ORDER — EPINEPHRINE 1 MG/ML
0.02 INJECTION INTRAMUSCULAR; INTRAVENOUS; SUBCUTANEOUS
Qty: 4 | Refills: 0 | Status: DISCONTINUED | OUTPATIENT
Start: 2024-10-29 | End: 2024-10-30

## 2024-10-29 RX ORDER — POLYETHYLENE GLYCOL 3350 17 G/17G
17 POWDER, FOR SOLUTION ORAL DAILY
Refills: 0 | Status: DISCONTINUED | OUTPATIENT
Start: 2024-10-30 | End: 2024-11-05

## 2024-10-29 RX ORDER — ASPIRIN/MAG CARB/ALUMINUM AMIN 325 MG
81 TABLET ORAL DAILY
Refills: 0 | Status: DISCONTINUED | OUTPATIENT
Start: 2024-10-30 | End: 2024-10-30

## 2024-10-29 RX ORDER — CHLORHEXIDINE GLUCONATE 40 MG/ML
15 SOLUTION TOPICAL EVERY 12 HOURS
Refills: 0 | Status: DISCONTINUED | OUTPATIENT
Start: 2024-10-29 | End: 2024-11-02

## 2024-10-29 RX ORDER — DOBUTAMINE HCL IN DEXTROSE 5 % 500MG/250
3 INTRAVENOUS SOLUTION INTRAVENOUS
Qty: 500 | Refills: 0 | Status: DISCONTINUED | OUTPATIENT
Start: 2024-10-29 | End: 2024-10-30

## 2024-10-29 RX ORDER — OXYCODONE HYDROCHLORIDE 30 MG/1
5 TABLET ORAL EVERY 4 HOURS
Refills: 0 | Status: DISCONTINUED | OUTPATIENT
Start: 2024-10-29 | End: 2024-11-01

## 2024-10-29 RX ORDER — NICARDIPINE HCL 25 MG/10ML
5 AMPUL (ML) INTRAVENOUS
Qty: 40 | Refills: 0 | Status: DISCONTINUED | OUTPATIENT
Start: 2024-10-29 | End: 2024-10-29

## 2024-10-29 RX ORDER — ACETAMINOPHEN 500 MG
975 TABLET ORAL EVERY 6 HOURS
Refills: 0 | Status: COMPLETED | OUTPATIENT
Start: 2024-10-30 | End: 2024-11-01

## 2024-10-29 RX ADMIN — ESMOLOL HYDROCHLORIDE IN SODIUM CHLORIDE 32.1 MICROGRAM(S)/KG/MIN: 20 INJECTION INTRAVENOUS at 14:15

## 2024-10-29 RX ADMIN — Medication 25 MG/HR: at 14:00

## 2024-10-29 RX ADMIN — MORPHINE SULFATE 4 MILLIGRAM(S): 30 TABLET, FILM COATED, EXTENDED RELEASE ORAL at 13:23

## 2024-10-29 RX ADMIN — LABETALOL HYDROCHLORIDE 10 MILLIGRAM(S): 200 TABLET, FILM COATED ORAL at 14:05

## 2024-10-29 RX ADMIN — MORPHINE SULFATE 4 MILLIGRAM(S): 30 TABLET, FILM COATED, EXTENDED RELEASE ORAL at 14:52

## 2024-10-29 NOTE — H&P ADULT - ASSESSMENT
Encounter addended by: Beatrice Aponte on: 10/23/2018  3:51 PM<BR>     Actions taken:  activity accessed, Charge Capture section accepted 40 y/o male with no pertinent past medical history presents to  with c/o chest tightness and pain, as per  chart patient described pain as a "squeezing" sensation across chest radiating down to leg. Pt reports that the pain woke him up at around 2am last night10/29 . He reportedly went to Urgent Care thought pain was muscular so was prescribed muscle relaxer. In  ED patient Pt had CTA chest significant for Aortic dissection which is noted from the level of the root of the aorta extending into the ascending, aortic arch, descending thoracic aorta as well as extending into the abdominal aorta and is noted to end in the distal left common iliac artery. The dissection flap is in close proximity to the right coronary artery. The left renal artery is arising from the false lumen. Pt being transferred to [facility] going directly to the OR admitted under Dr. Grimm.    Plan   Pt being transferred via helicopter with plan to go directly to OR for repair of Type A dissection  40 y/o male with no pertinent past medical history presents to  with c/o chest tightness and pain, as per  chart patient described pain as a "squeezing" sensation across chest radiating down to leg. Pt reports that the pain woke him up at around 2am last night10/29 . He reportedly went to Urgent Care thought pain was muscular so was prescribed muscle relaxer. In  ED patient Pt had CTA chest significant for Aortic dissection which is noted from the level of the root of the aorta extending into the ascending, aortic arch, descending thoracic aorta as well as extending into the abdominal aorta and is noted to end in the distal left common iliac artery. The dissection flap is in close proximity to the right coronary artery. The left renal artery is arising from the false lumen. Pt being transferred to [facility] going directly to the OR admitted under Dr. Grimm.

## 2024-10-29 NOTE — ED ADULT NURSE NOTE - OBJECTIVE STATEMENT
Pt A&OX3, presenting to the ER with c/o chest pain and tightness. Pt report the pain woke him out of his sleep around 2am this morning. The pain radiates to his back. The abdominal pain started about 2 hours ago, Went to urgent care, thought it was muscular and gave him muscle relaxer.  Pt denies SOB, nausea, vomiting, urinary symptoms. EKG completed. Pt placed on SPO2 and cradiac monitor. Pt taken to trauma room. Bilateral 18 g IV inserted ACs.

## 2024-10-29 NOTE — BRIEF OPERATIVE NOTE - NSICDXBRIEFPOSTOP_GEN_ALL_CORE_FT
POST-OP DIAGNOSIS:  Dissection of ascending aorta and aortic arch 29-Oct-2024 23:09:28  Papito Landry

## 2024-10-29 NOTE — ED PROVIDER NOTE - PHYSICAL EXAMINATION
GENERAL: A&Ox4, uncomfortable appearing secondary to pain  HEENT: NCAT, EOMI, oral mucosa moist, normal conjunctiva  RESP: CTAB, no respiratory distress, no wheezes/rhonchi/rales, speaking in full sentences  CV: RRR, no murmurs/rubs/gallops  ABDOMEN: soft, non-tender, distended, no guarding, no rebound tenderness  MSK: no visible deformities  NEURO: no focal sensory or motor deficits, CN 2-12 grossly intact  SKIN: warm, normal color, well perfused, no rash  PSYCH: appropriate affect  POCUS: normal RV:LV ratio, lungs a-line predominant

## 2024-10-29 NOTE — H&P ADULT - PROBLEM SELECTOR PLAN 1
Pt transferred via [facility] and taken directly to OR for emergent repair of Type A dissection

## 2024-10-29 NOTE — ED ADULT TRIAGE NOTE - CHIEF COMPLAINT QUOTE
Pt A&OX3, presenting to the ER with c/o chest pain and tightness. Pt report the pain woke him out of his sleep around 2am this morning. The pain radiates to his back. The abdominal pain started about 2 hours ago, Went to urgent care, thought it was muscular and gave him muscle relaxer.  Pt denies SOB, nausea, vomiting, urinary symptoms.   Taken for STAT EKG.

## 2024-10-29 NOTE — ED PROVIDER NOTE - PROGRESS NOTE DETAILS
CTA reviewed, patient w/ DeBakey type 1 dissection. Esmolol and Nicardipine ordered and RN aware. Transfer center contacted for tier 1 transfer.

## 2024-10-29 NOTE — ED ADULT NURSE NOTE - NSFALLHARMRISKINTERV_ED_ALL_ED
Assistance with ambulation/Communicate risk of Fall with Harm to all staff, patient, and family/Provide visual cue: red socks, yellow wristband, yellow gown, etc/Reinforce activity limits and safety measures with patient and family/Bed in lowest position, wheels locked, appropriate side rails in place/Call bell, personal items and telephone in reach/Instruct patient to call for assistance before getting out of bed/chair/stretcher/Non-slip footwear applied when patient is off stretcher/[place] to call system/Physically safe environment - no spills, clutter or unnecessary equipment/Purposeful Proactive Rounding/Room/bathroom lighting operational, light cord in reach

## 2024-10-29 NOTE — ED STATDOCS - PROGRESS NOTE DETAILS
Christina Hill for attending Dr. Larios: 40 y/o male presents to the ED c/o CP. Pt reports last night CP woke up him up. Pt also reports "abd tightness" and pain. Pt was seen at urgent care and was prescribed muscle relaxer. Pt took one dose and Motrin. Pt reports he not urinating as much as he normally would. Denies diarrhea. Nonsmoker. No other complaints at this time. Pt with CP radiating to back, pale and diaphoretic. Will send pt to main ED for further evaluation. Christina Hill for attending Dr. Larios: 40 y/o male presents to the ED c/o CP. Pt reports last night CP woke up him up. Pt also reports "abd tightness" and pain. Pt was seen at urgent care and was prescribed muscle relaxer. Pt took one dose and Motrin. Pt reports he not urinating as much as he normally would. Denies diarrhea. Nonsmoker. No other complaints at this time. Pt with CP radiating to back, pale and diaphoretic. Concern for possible aortic dissection. I ordered scans and spoke with phsyician in main ED  Will send pt to main ED for further evaluation.

## 2024-10-29 NOTE — BRIEF OPERATIVE NOTE - NSICDXBRIEFPROCEDURE_GEN_ALL_CORE_FT
PROCEDURES:  Repair of type A aortic dissection 29-Oct-2024 23:06:59 [place] Procedure with reconstruction of Right COronary artery Papito Landry

## 2024-10-29 NOTE — H&P ADULT - NSICDXNOPASTSURGICALHX_GEN_ALL_CORE
Physical Therapy    Orders received and chart reviewed. Met with pt who declined having PT needs at this time. Stated he is independent with all mobility, is up ad angelique in his room, and his eye s/s have resolved. Will sign off on pt at this time. D/C. No Charge.     Rhoda Fernando, PT, DPT, OMT-C # [id number] <-- Click to add NO significant Past Surgical History

## 2024-10-29 NOTE — ED PROVIDER NOTE - OBJECTIVE STATEMENT
40 y/o male with no pertinent past medical history presents complaining of chest tightness and pain, describes pain as a "squeezing" sensation across chest radiating down to leg. Pt reports that the pain woke him up at around 2am last night. Reportedly went to Urgent Care who thought pain was muscular so was prescribed muscle relaxer.  Pt denies numbness/tingling, other medical problems or medication use, or alcohol/drug use. No other complaints at this time.

## 2024-10-29 NOTE — PROVIDER CONTACT NOTE (CRITICAL VALUE NOTIFICATION) - TEST AND RESULT REPORTED:
Patient Education     Influenza (Child)    Influenza is also called the flu. It is a viral illness that affects the air passages of your lungs. It is different from the common cold. The flu can easily be passed from one to person to another. It may be spread through the air by coughing and sneezing. Or it can be spread by touching the sick person and then touching your own eyes, nose, or mouth. Symptoms of the flu may be mild or severe. They can include extreme tiredness (wanting to stay in bed all day), chills, fevers, muscle aches, soreness with eye movement, headache, and a dry, hacking cough. Your child usually wonât need to take antibiotics, unless he or she has a complication. This might be an ear or sinus infection or pneumonia. Home care  Follow these guidelines when caring for your child at home:  Â· Fluids. Fever increases the amount of water your child loses from his or her body. For babies younger than 3year old, keep giving regular feedings (formula or breast). Â Talk with your childâs healthcare provider to find out how much fluid your baby should be getting. If needed, give an oral rehydration solution. You can buy this at the grocery or drugstore without a prescription. For a childÂ older than 1 year, give him or her more fluids and continue his or her normal diet. If your child is dehydrated, give an oral rehydration. Go back to your childâs normal diet as soon as possible. Â If your child has diarrhea, donât give juice, flavored gelatin water, soft drinks without caffeine, lemonade, fruit drinks, or popsicles. This may make diarrhea worse. Â· Food. If your child doesnât want to eat solid foods, itâs OK for a few days. Make sure your child drinks lots of fluid and has a normal amount of urine. Â· Activity. Keep children with fever at home resting or playing quietly. Encourage your child to take naps. Your child may go back to  or school when the fever is gone for at least 24 hours.  The fever Troponin 463.51 should be gone without giving your child acetaminophen or other medicine to reduce fever. Your child should also be eating well and feeling better. Â· Sleep. Itâs normal for your child to be unable to sleep or be irritable if he or she has the flu. A child who has congestion will sleep best with his or her head and upper body raisedÂ up. Or youÂ can raise the head of the bed frame on a 6-inch block. Â· Cough. Coughing is a normal part of the flu. You can use a cool mist humidifier at the bedside. Donât give over-the-counter cough and cold medicines to children younger than 10years of age, unless the healthcare provider tells you to do so. These medicines donât help ease symptoms. And they can cause serious side effects, especially in babies younger than 3years of age. Donât allow anyone to smoke around your child. Smoke can make the cough worse. Â· Nasal congestion. Use a rubber bulb syringe to suction the nose of a baby. You may put 2 to 3 drops of saltwater (saline) nose drops in each nostril before suctioning. This will help remove secretions. You can buy saline nose drops without a prescription. You can make the drops yourself by adding 1/4 teaspoon table salt to 1 cup of water. Â· Fever. Use acetaminophen to control pain, unless another medicine was prescribed. In infants older than 10months of age, you may use ibuprofen instead of acetaminophen. If your child has chronic liver or kidney disease, talk with your childâs provider before using these medicines. Also talk with the provider if your child has ever had a stomach ulcer or GI bleeding. Donât give aspirin to anyone under 25years of age who is ill with a fever. It may cause severe liver damage. Follow-up care  Follow up with your childâs health care provider, or as advised.   When to seek medical advice  Call your childâs healthcare provider right away if any of these occur:  Â· Your child is younger than 16 weeks old and has a fever of 100.4Â°F (38Â°C) or "higher. Your baby may need to be seen by a healthcare provider. Â· Your child has repeated fevers above 104Â°F (40Â°C) at any age. Â· Your child is younger than 3years old and his or her fever continues for more than 24 hours. Or your child is 3years old or older and his or her fever continues for more than 3 days. Â· Fast breathing. In a child 6 weeks to 2 years, this is more than 45 breaths per minute. In a child 3 to 6 years, this is more than 35 breaths per minute. In a child 7 to 10 years, this is more than 30 breaths per minute. In a child older than 10 years, this is more than Â 25 breaths per minute. Â· Earache, sinus pain, stiff or painful neck, headache, or repeated diarrhea or vomiting  Â· Unusual fussiness, drowsiness, or confusion  Â· Your child doesnât interact with you as he or she normally does  Â· Your child doesnât want to be held  Â· Not drinking enough fluid. This may show as no tears when crying, or ""sunken\"" eyes or dry mouth. It may also be no wet diapers for 8 hours in a baby. Or it may be less urine than usual in older children. Â· RashÂ with fever  Â© 5763-6[address] [employer] [address] [place] [address]. All rights reserved. This information is not intended as a substitute for professional medical care. Always follow your healthcare professional's instructions.            "

## 2024-10-29 NOTE — H&P ADULT - HISTORY OF PRESENT ILLNESS
38 y/o male with no pertinent past medical history presents to  with c/o chest tightness and pain, as per  chart patient described pain as a "squeezing" sensation across chest radiating down to leg. Pt reports that the pain woke him up at around 2am last night10/29 . He reportedly went to Urgent Care thought pain was muscular so was prescribed muscle relaxer. In  ED patient Pt had CTA chest significant for Aortic dissection which is noted from the level of the root of the aorta extending into the ascending, aortic arch, descending thoracic aorta as well as extending into the abdominal aorta and is noted to end in the distal left common iliac artery. The dissection flap is in close proximity to the right coronary artery. The left renal artery is arising from the false lumen. Pt being [facility] going directly to the OR admitted under Dr. Grimm 
Patient is not pregnant (male or female)

## 2024-10-29 NOTE — H&P ADULT - NSHPPHYSICALEXAM_GEN_ALL_CORE
unable to perform 2/2 to clinical condition  patient going emergently to the OR for Type A dissection General: Well appearing, NAD  Neuro: AxO x3, non-focal, GOSS  Cardiac: S1S2, no murmurs  Pulm: CTA b/l, no wheezing or rales  Abdomen: Soft, NT, ND  Peripheral: +DP and radial pulses b/l upper and lower extremities, no peripheral edema

## 2024-10-29 NOTE — BRIEF OPERATIVE NOTE - NSICDXBRIEFPREOP_GEN_ALL_CORE_FT
PRE-OP DIAGNOSIS:  Ascending aortic dissection 29-Oct-2024 23:08:52 Type A Aortic Dissection Papito Landry

## 2024-10-29 NOTE — ED PROVIDER NOTE - CLINICAL SUMMARY MEDICAL DECISION MAKING FREE TEXT BOX
39-year-old male presenting with acute onset of midsternal chest pain woke him up last night, now with pain radiating down the back and into the abdomen.  Appears very uncomfortable, hypertensive and tachycardic.  Will need to rule out aortic aneurysm versus dissection with labs, CTA chest/abdomen/pelvis, pain management, reassess.

## 2024-10-29 NOTE — ED ADULT TRIAGE NOTE - INTERNATIONAL TRAVEL
"CC:  presents with Pharyngitis            Pharyngitis   This is a new problem. The current episode started in the past 3 days. The problem has been unchanged. There has been  Fever, Tmax 101. The pain is mild. Associated symptoms include a dry cough. Pertinent negatives include no abdominal pain,   diarrhea, headaches, shortness of breath or vomiting. no exposure to strep or mono.   has tried acetaminophen for the symptoms. The treatment provided mild relief.       She is not vaccinated for COVID.    She took home covid test yesterday and it was positive.         Social History     Tobacco Use   • Smoking status: Never Smoker   • Smokeless tobacco: Never Used   Substance Use Topics   • Alcohol use: No   • Drug use: No       Past Medical History:   Diagnosis Date   • GERD (gastroesophageal reflux disease)     Acid reflux   • Headache(784.0)     Reg headaches every other day lasting until medication is taken for relief.   • Hypertension     Only during pregnancy   • Migraine 2009    Migraines 1-2 per week lasting 2-3 days since 2009       Review of Systems    HENT: Positive for sore throat  Respiratory: Negative for  sputum production and shortness of breath.    Cardiovascular: Negative for chest pain.   Gastrointestinal: Negative for nausea, vomiting, abdominal pain and diarrhea.   Genitourinary: Negative.    Neurological: Negative for dizziness and headaches.   All other systems reviewed and are negative.         Objective:   /88 (BP Location: Left arm, Patient Position: Sitting, BP Cuff Size: Adult)   Pulse 90   Temp 36.6 °C (97.8 °F) (Temporal)   Resp 16   Ht 1.676 m (5' 6\")   Wt (!) 132 kg (290 lb)   SpO2 96%         Physical Exam   Constitutional:   oriented to person, place, and time.  appears well-developed and well-nourished. No distress.   HENT:   Head: Normocephalic and atraumatic.   Right Ear: External ear normal.   Left Ear: External ear normal.   Nose: Mucosal edema present. Right sinus " exhibits no maxillary sinus tenderness and no frontal sinus tenderness. Left sinus exhibits no maxillary sinus tenderness and no frontal sinus tenderness.   Mouth/Throat: no posterior oropharyngeal exudate.   There is posterior oropharyngeal erythema present. No posterior oropharyngeal edema.   Tonsils 2+ bilaterally     Eyes: Conjunctivae and EOM are normal. Pupils are equal, round, and reactive to light. Right eye exhibits no discharge. Left eye exhibits no discharge. No scleral icterus.   Neck: Normal range of motion. Neck supple. No JVD present. No tracheal deviation present. No thyromegaly present.   Cardiovascular: Normal rate, regular rhythm, normal heart sounds and intact distal pulses.  Exam reveals no friction rub.    No murmur heard.  Pulmonary/Chest: Effort normal and breath sounds normal. No respiratory distress.   no wheezes.   no rales.    Musculoskeletal:  exhibits no edema.   Lymphadenopathy:    no cervical LAD  Neurological:   alert and oriented to person, place, and time.   Skin: Skin is warm and dry. No erythema.   Psychiatric:   normal mood and affect.   Nursing note and vitals reviewed.             Assessment/Plan:     1.  COVID-19    Pt is unvaccinated,  presents with cough, sore throat and systemic symptoms (fever)     covid positive      Rapid strep   negative.           rest, push fluids at home.     Home isolation per CDC guidelines      Follow up in one week if no improvement, sooner if symptoms worsen.        No

## 2024-10-30 PROBLEM — Z78.9 OTHER SPECIFIED HEALTH STATUS: Chronic | Status: ACTIVE | Noted: 2024-10-29

## 2024-10-30 LAB
ALBUMIN SERPL ELPH-MCNC: 2.9 G/DL — LOW (ref 3.3–5.2)
ALBUMIN SERPL ELPH-MCNC: 3.3 G/DL — SIGNIFICANT CHANGE UP (ref 3.3–5.2)
ALBUMIN SERPL ELPH-MCNC: 3.3 G/DL — SIGNIFICANT CHANGE UP (ref 3.3–5.2)
ALP SERPL-CCNC: 49 U/L — SIGNIFICANT CHANGE UP (ref 40–120)
ALP SERPL-CCNC: 57 U/L — SIGNIFICANT CHANGE UP (ref 40–120)
ALP SERPL-CCNC: 59 U/L — SIGNIFICANT CHANGE UP (ref 40–120)
ALT FLD-CCNC: 30 U/L — SIGNIFICANT CHANGE UP
ALT FLD-CCNC: 31 U/L — SIGNIFICANT CHANGE UP
ALT FLD-CCNC: 35 U/L — SIGNIFICANT CHANGE UP
ANION GAP SERPL CALC-SCNC: 11 MMOL/L — SIGNIFICANT CHANGE UP (ref 5–17)
ANION GAP SERPL CALC-SCNC: 16 MMOL/L — SIGNIFICANT CHANGE UP (ref 5–17)
ANION GAP SERPL CALC-SCNC: 19 MMOL/L — HIGH (ref 5–17)
ANISOCYTOSIS BLD QL: SLIGHT — SIGNIFICANT CHANGE UP
AST SERPL-CCNC: 120 U/L — HIGH
AST SERPL-CCNC: 81 U/L — HIGH
AST SERPL-CCNC: 97 U/L — HIGH
BASOPHILS # BLD AUTO: 0 K/UL — SIGNIFICANT CHANGE UP (ref 0–0.2)
BASOPHILS # BLD AUTO: 0.04 K/UL — SIGNIFICANT CHANGE UP (ref 0–0.2)
BASOPHILS NFR BLD AUTO: 0 % — SIGNIFICANT CHANGE UP (ref 0–2)
BASOPHILS NFR BLD AUTO: 0.3 % — SIGNIFICANT CHANGE UP (ref 0–2)
BILIRUB SERPL-MCNC: 0.6 MG/DL — SIGNIFICANT CHANGE UP (ref 0.4–2)
BILIRUB SERPL-MCNC: 1.2 MG/DL — SIGNIFICANT CHANGE UP (ref 0.4–2)
BILIRUB SERPL-MCNC: 1.3 MG/DL — SIGNIFICANT CHANGE UP (ref 0.4–2)
BUN SERPL-MCNC: 18.2 MG/DL — SIGNIFICANT CHANGE UP (ref 8–20)
BUN SERPL-MCNC: 19.1 MG/DL — SIGNIFICANT CHANGE UP (ref 8–20)
BUN SERPL-MCNC: 26.3 MG/DL — HIGH (ref 8–20)
CALCIUM SERPL-MCNC: 7.8 MG/DL — LOW (ref 8.4–10.5)
CALCIUM SERPL-MCNC: 8 MG/DL — LOW (ref 8.4–10.5)
CALCIUM SERPL-MCNC: 8.8 MG/DL — SIGNIFICANT CHANGE UP (ref 8.4–10.5)
CHLORIDE SERPL-SCNC: 107 MMOL/L — SIGNIFICANT CHANGE UP (ref 96–108)
CHLORIDE SERPL-SCNC: 110 MMOL/L — HIGH (ref 96–108)
CHLORIDE SERPL-SCNC: 110 MMOL/L — HIGH (ref 96–108)
CK MB CFR SERPL CALC: 124.9 NG/ML — HIGH (ref 0–6.7)
CK MB CFR SERPL CALC: 143 NG/ML — HIGH (ref 0–6.7)
CK SERPL-CCNC: 1364 U/L — HIGH (ref 30–200)
CK SERPL-CCNC: 1700 U/L — HIGH (ref 30–200)
CO2 SERPL-SCNC: 21 MMOL/L — LOW (ref 22–29)
CO2 SERPL-SCNC: 21 MMOL/L — LOW (ref 22–29)
CO2 SERPL-SCNC: 25 MMOL/L — SIGNIFICANT CHANGE UP (ref 22–29)
CREAT SERPL-MCNC: 1.05 MG/DL — SIGNIFICANT CHANGE UP (ref 0.5–1.3)
CREAT SERPL-MCNC: 1.3 MG/DL — SIGNIFICANT CHANGE UP (ref 0.5–1.3)
CREAT SERPL-MCNC: 1.42 MG/DL — HIGH (ref 0.5–1.3)
EGFR: 65 ML/MIN/1.73M2 — SIGNIFICANT CHANGE UP
EGFR: 73 ML/MIN/1.73M2 — SIGNIFICANT CHANGE UP
EGFR: 94 ML/MIN/1.73M2 — SIGNIFICANT CHANGE UP
EOSINOPHIL # BLD AUTO: 0.01 K/UL — SIGNIFICANT CHANGE UP (ref 0–0.5)
EOSINOPHIL # BLD AUTO: 0.45 K/UL — SIGNIFICANT CHANGE UP (ref 0–0.5)
EOSINOPHIL NFR BLD AUTO: 0.1 % — SIGNIFICANT CHANGE UP (ref 0–6)
EOSINOPHIL NFR BLD AUTO: 1.8 % — SIGNIFICANT CHANGE UP (ref 0–6)
GAS PNL BLDA: SIGNIFICANT CHANGE UP
GAS PNL BLDV: SIGNIFICANT CHANGE UP
GIANT PLATELETS BLD QL SMEAR: PRESENT — SIGNIFICANT CHANGE UP
GLUCOSE BLDC GLUCOMTR-MCNC: 118 MG/DL — HIGH (ref 70–99)
GLUCOSE BLDC GLUCOMTR-MCNC: 128 MG/DL — HIGH (ref 70–99)
GLUCOSE BLDC GLUCOMTR-MCNC: 129 MG/DL — HIGH (ref 70–99)
GLUCOSE BLDC GLUCOMTR-MCNC: 130 MG/DL — HIGH (ref 70–99)
GLUCOSE BLDC GLUCOMTR-MCNC: 132 MG/DL — HIGH (ref 70–99)
GLUCOSE BLDC GLUCOMTR-MCNC: 132 MG/DL — HIGH (ref 70–99)
GLUCOSE BLDC GLUCOMTR-MCNC: 135 MG/DL — HIGH (ref 70–99)
GLUCOSE BLDC GLUCOMTR-MCNC: 136 MG/DL — HIGH (ref 70–99)
GLUCOSE BLDC GLUCOMTR-MCNC: 137 MG/DL — HIGH (ref 70–99)
GLUCOSE BLDC GLUCOMTR-MCNC: 144 MG/DL — HIGH (ref 70–99)
GLUCOSE BLDC GLUCOMTR-MCNC: 147 MG/DL — HIGH (ref 70–99)
GLUCOSE BLDC GLUCOMTR-MCNC: 149 MG/DL — HIGH (ref 70–99)
GLUCOSE BLDC GLUCOMTR-MCNC: 156 MG/DL — HIGH (ref 70–99)
GLUCOSE BLDC GLUCOMTR-MCNC: 168 MG/DL — HIGH (ref 70–99)
GLUCOSE BLDC GLUCOMTR-MCNC: 170 MG/DL — HIGH (ref 70–99)
GLUCOSE BLDC GLUCOMTR-MCNC: 177 MG/DL — HIGH (ref 70–99)
GLUCOSE BLDC GLUCOMTR-MCNC: 200 MG/DL — HIGH (ref 70–99)
GLUCOSE SERPL-MCNC: 140 MG/DL — HIGH (ref 70–99)
GLUCOSE SERPL-MCNC: 177 MG/DL — HIGH (ref 70–99)
GLUCOSE SERPL-MCNC: 201 MG/DL — HIGH (ref 70–99)
HCT VFR BLD CALC: 30.8 % — LOW (ref 39–50)
HCT VFR BLD CALC: 35.2 % — LOW (ref 39–50)
HGB BLD-MCNC: 10.4 G/DL — LOW (ref 13–17)
HGB BLD-MCNC: 12.1 G/DL — LOW (ref 13–17)
IMM GRANULOCYTES NFR BLD AUTO: 1.7 % — HIGH (ref 0–0.9)
LYMPHOCYTES # BLD AUTO: 0.86 K/UL — LOW (ref 1–3.3)
LYMPHOCYTES # BLD AUTO: 11.7 % — LOW (ref 13–44)
LYMPHOCYTES # BLD AUTO: 2.89 K/UL — SIGNIFICANT CHANGE UP (ref 1–3.3)
LYMPHOCYTES # BLD AUTO: 5.5 % — LOW (ref 13–44)
MAGNESIUM SERPL-MCNC: 2.4 MG/DL — SIGNIFICANT CHANGE UP (ref 1.6–2.6)
MAGNESIUM SERPL-MCNC: 2.5 MG/DL — SIGNIFICANT CHANGE UP (ref 1.6–2.6)
MANUAL SMEAR VERIFICATION: SIGNIFICANT CHANGE UP
MCHC RBC-ENTMCNC: 28.7 PG — SIGNIFICANT CHANGE UP (ref 27–34)
MCHC RBC-ENTMCNC: 29.2 PG — SIGNIFICANT CHANGE UP (ref 27–34)
MCHC RBC-ENTMCNC: 33.8 G/DL — SIGNIFICANT CHANGE UP (ref 32–36)
MCHC RBC-ENTMCNC: 34.4 G/DL — SIGNIFICANT CHANGE UP (ref 32–36)
MCV RBC AUTO: 84.8 FL — SIGNIFICANT CHANGE UP (ref 80–100)
MCV RBC AUTO: 85 FL — SIGNIFICANT CHANGE UP (ref 80–100)
MONOCYTES # BLD AUTO: 1.23 K/UL — HIGH (ref 0–0.9)
MONOCYTES # BLD AUTO: 2 K/UL — HIGH (ref 0–0.9)
MONOCYTES NFR BLD AUTO: 7.9 % — SIGNIFICANT CHANGE UP (ref 2–14)
MONOCYTES NFR BLD AUTO: 8.1 % — SIGNIFICANT CHANGE UP (ref 2–14)
NEUTROPHILS # BLD AUTO: 13.17 K/UL — HIGH (ref 1.8–7.4)
NEUTROPHILS # BLD AUTO: 19.17 K/UL — HIGH (ref 1.8–7.4)
NEUTROPHILS NFR BLD AUTO: 77.5 % — HIGH (ref 43–77)
NEUTROPHILS NFR BLD AUTO: 84.5 % — HIGH (ref 43–77)
PLAT MORPH BLD: NORMAL — SIGNIFICANT CHANGE UP
PLATELET # BLD AUTO: 158 K/UL — SIGNIFICANT CHANGE UP (ref 150–400)
PLATELET # BLD AUTO: 192 K/UL — SIGNIFICANT CHANGE UP (ref 150–400)
POTASSIUM SERPL-MCNC: 4 MMOL/L — SIGNIFICANT CHANGE UP (ref 3.5–5.3)
POTASSIUM SERPL-MCNC: 4.2 MMOL/L — SIGNIFICANT CHANGE UP (ref 3.5–5.3)
POTASSIUM SERPL-MCNC: 4.3 MMOL/L — SIGNIFICANT CHANGE UP (ref 3.5–5.3)
POTASSIUM SERPL-SCNC: 4 MMOL/L — SIGNIFICANT CHANGE UP (ref 3.5–5.3)
POTASSIUM SERPL-SCNC: 4.2 MMOL/L — SIGNIFICANT CHANGE UP (ref 3.5–5.3)
POTASSIUM SERPL-SCNC: 4.3 MMOL/L — SIGNIFICANT CHANGE UP (ref 3.5–5.3)
PROT SERPL-MCNC: 4.7 G/DL — LOW (ref 6.6–8.7)
PROT SERPL-MCNC: 5.2 G/DL — LOW (ref 6.6–8.7)
PROT SERPL-MCNC: 5.4 G/DL — LOW (ref 6.6–8.7)
RBC # BLD: 3.63 M/UL — LOW (ref 4.2–5.8)
RBC # BLD: 4.14 M/UL — LOW (ref 4.2–5.8)
RBC # FLD: 14.2 % — SIGNIFICANT CHANGE UP (ref 10.3–14.5)
RBC # FLD: 14.6 % — HIGH (ref 10.3–14.5)
RBC BLD AUTO: ABNORMAL
SODIUM SERPL-SCNC: 146 MMOL/L — HIGH (ref 135–145)
SODIUM SERPL-SCNC: 147 MMOL/L — HIGH (ref 135–145)
SODIUM SERPL-SCNC: 147 MMOL/L — HIGH (ref 135–145)
TROPONIN T, HIGH SENSITIVITY RESULT: 2656 NG/L — HIGH (ref 0–51)
TROPONIN T, HIGH SENSITIVITY RESULT: 3342 NG/L — HIGH (ref 0–51)
VARIANT LYMPHS # BLD: 0.9 % — SIGNIFICANT CHANGE UP (ref 0–6)
WBC # BLD: 15.58 K/UL — HIGH (ref 3.8–10.5)
WBC # BLD: 15.85 K/UL — HIGH (ref 3.8–10.5)
WBC # FLD AUTO: 15.58 K/UL — HIGH (ref 3.8–10.5)
WBC # FLD AUTO: 15.85 K/UL — HIGH (ref 3.8–10.5)

## 2024-10-30 PROCEDURE — 99292 CRITICAL CARE ADDL 30 MIN: CPT

## 2024-10-30 PROCEDURE — 71045 X-RAY EXAM CHEST 1 VIEW: CPT | Mod: 26

## 2024-10-30 PROCEDURE — 99291 CRITICAL CARE FIRST HOUR: CPT | Mod: 24

## 2024-10-30 PROCEDURE — 93010 ELECTROCARDIOGRAM REPORT: CPT

## 2024-10-30 PROCEDURE — 99292 CRITICAL CARE ADDL 30 MIN: CPT | Mod: 24

## 2024-10-30 RX ORDER — POTASSIUM CHLORIDE 10 MEQ
10 TABLET, EXTENDED RELEASE ORAL
Refills: 0 | Status: COMPLETED | OUTPATIENT
Start: 2024-10-30 | End: 2024-10-30

## 2024-10-30 RX ORDER — NICARDIPINE HCL 30 MG
2.5 CAPSULE, EXTENDED RELEASE ORAL
Qty: 40 | Refills: 0 | Status: DISCONTINUED | OUTPATIENT
Start: 2024-10-30 | End: 2024-10-31

## 2024-10-30 RX ORDER — ALBUMIN HUMAN 50 G/1000ML
250 SOLUTION INTRAVENOUS ONCE
Refills: 0 | Status: COMPLETED | OUTPATIENT
Start: 2024-10-30 | End: 2024-10-30

## 2024-10-30 RX ORDER — INFLUENZ VIR VAC TV P-SURF2003 15MCG/.5ML
0.5 SYRINGE (ML) INTRAMUSCULAR ONCE
Refills: 0 | Status: DISCONTINUED | OUTPATIENT
Start: 2024-10-30 | End: 2024-11-02

## 2024-10-30 RX ORDER — FENTANYL CITRAT/DEXTROSE 5%/PF 1250MCG/50
25 PATIENT CONTROLLED ANALGESIA SYRINGE INTRAVENOUS ONCE
Refills: 0 | Status: DISCONTINUED | OUTPATIENT
Start: 2024-10-30 | End: 2024-10-30

## 2024-10-30 RX ORDER — HYDROMORPHONE HCL/0.9% NACL/PF 6 MG/30 ML
0.5 PATIENT CONTROLLED ANALGESIA SYRINGE INTRAVENOUS ONCE
Refills: 0 | Status: DISCONTINUED | OUTPATIENT
Start: 2024-10-30 | End: 2024-10-30

## 2024-10-30 RX ORDER — CALCIUM GLUCONATE 98 MG/ML
2 INJECTION, SOLUTION INTRAVENOUS ONCE
Refills: 0 | Status: COMPLETED | OUTPATIENT
Start: 2024-10-30 | End: 2024-10-30

## 2024-10-30 RX ORDER — ASPIRIN/MAG CARB/ALUMINUM AMIN 325 MG
81 TABLET ORAL DAILY
Refills: 0 | Status: DISCONTINUED | OUTPATIENT
Start: 2024-10-30 | End: 2024-11-11

## 2024-10-30 RX ORDER — NICARDIPINE HCL 30 MG
2.5 CAPSULE, EXTENDED RELEASE ORAL
Qty: 40 | Refills: 0 | Status: DISCONTINUED | OUTPATIENT
Start: 2024-10-30 | End: 2024-10-30

## 2024-10-30 RX ORDER — ASPIRIN/MAG CARB/ALUMINUM AMIN 325 MG
81 TABLET ORAL DAILY
Refills: 0 | Status: DISCONTINUED | OUTPATIENT
Start: 2024-10-30 | End: 2024-10-30

## 2024-10-30 RX ORDER — HYDRALAZINE HYDROCHLORIDE 50 MG/1
2.5 TABLET, FILM COATED ORAL ONCE
Refills: 0 | Status: COMPLETED | OUTPATIENT
Start: 2024-10-30 | End: 2024-10-30

## 2024-10-30 RX ORDER — DOBUTAMINE HCL IN DEXTROSE 5 % 500MG/250
1 INTRAVENOUS SOLUTION INTRAVENOUS
Qty: 500 | Refills: 0 | Status: DISCONTINUED | OUTPATIENT
Start: 2024-10-30 | End: 2024-11-02

## 2024-10-30 RX ORDER — VANCOMYCIN HYDROCHLORIDE 50 MG/ML
1500 KIT ORAL EVERY 12 HOURS
Refills: 0 | Status: COMPLETED | OUTPATIENT
Start: 2024-10-30 | End: 2024-10-31

## 2024-10-30 RX ORDER — CEFUROXIME AXETIL 250 MG
1500 TABLET ORAL EVERY 8 HOURS
Refills: 0 | Status: COMPLETED | OUTPATIENT
Start: 2024-10-30 | End: 2024-10-31

## 2024-10-30 RX ORDER — FENTANYL CITRAT/DEXTROSE 5%/PF 1250MCG/50
50 PATIENT CONTROLLED ANALGESIA SYRINGE INTRAVENOUS ONCE
Refills: 0 | Status: DISCONTINUED | OUTPATIENT
Start: 2024-10-30 | End: 2024-10-30

## 2024-10-30 RX ORDER — FUROSEMIDE 40 MG
40 TABLET ORAL DAILY
Refills: 0 | Status: DISCONTINUED | OUTPATIENT
Start: 2024-10-30 | End: 2024-11-03

## 2024-10-30 RX ORDER — AMIODARONE HCL 200 MG
400 TABLET ORAL
Refills: 0 | Status: COMPLETED | OUTPATIENT
Start: 2024-11-01 | End: 2024-11-04

## 2024-10-30 RX ORDER — DEXMEDETOMIDINE HYDROCHLORIDE 400 UG/100ML
0.2 INJECTION, SOLUTION INTRAVENOUS
Qty: 200 | Refills: 0 | Status: DISCONTINUED | OUTPATIENT
Start: 2024-10-30 | End: 2024-11-02

## 2024-10-30 RX ADMIN — Medication 100 MILLIEQUIVALENT(S): at 21:53

## 2024-10-30 RX ADMIN — Medication 6.42 MICROGRAM(S)/KG/MIN: at 20:29

## 2024-10-30 RX ADMIN — Medication 0.5 MILLIGRAM(S): at 01:39

## 2024-10-30 RX ADMIN — Medication 100 MILLIEQUIVALENT(S): at 15:05

## 2024-10-30 RX ADMIN — Medication 100 MILLIGRAM(S): at 21:53

## 2024-10-30 RX ADMIN — Medication 500 MILLIGRAM(S): at 05:46

## 2024-10-30 RX ADMIN — DEXAMETHASONE 1.5 MG 4 MILLIGRAM(S): 1.5 TABLET ORAL at 21:53

## 2024-10-30 RX ADMIN — Medication 400 MILLIGRAM(S): at 01:15

## 2024-10-30 RX ADMIN — DEXAMETHASONE 1.5 MG 4 MILLIGRAM(S): 1.5 TABLET ORAL at 00:38

## 2024-10-30 RX ADMIN — Medication 100 MILLIEQUIVALENT(S): at 20:30

## 2024-10-30 RX ADMIN — CHLORHEXIDINE GLUCONATE 15 MILLILITER(S): 40 SOLUTION TOPICAL at 05:46

## 2024-10-30 RX ADMIN — Medication 50 MICROGRAM(S): at 17:25

## 2024-10-30 RX ADMIN — Medication 50 MICROGRAM(S): at 16:25

## 2024-10-30 RX ADMIN — NOREPINEPHRINE BITARTRATE 10 MICROGRAM(S)/KG/MIN: 1 INJECTION, SOLUTION, CONCENTRATE INTRAVENOUS at 11:15

## 2024-10-30 RX ADMIN — Medication 1000 MILLIGRAM(S): at 01:45

## 2024-10-30 RX ADMIN — Medication 100 MILLIEQUIVALENT(S): at 17:00

## 2024-10-30 RX ADMIN — Medication 100 MILLIGRAM(S): at 18:15

## 2024-10-30 RX ADMIN — HYDRALAZINE HYDROCHLORIDE 2.5 MILLIGRAM(S): 50 TABLET, FILM COATED ORAL at 07:00

## 2024-10-30 RX ADMIN — DEXAMETHASONE 1.5 MG 4 MILLIGRAM(S): 1.5 TABLET ORAL at 05:46

## 2024-10-30 RX ADMIN — DEXMEDETOMIDINE HYDROCHLORIDE 5.35 MICROGRAM(S)/KG/HR: 400 INJECTION, SOLUTION INTRAVENOUS at 21:57

## 2024-10-30 RX ADMIN — Medication 81 MILLIGRAM(S): at 11:03

## 2024-10-30 RX ADMIN — VASOPRESSIN IN 0.9% SODIUM CHLORIDE 3 UNIT(S)/MIN: 20 INJECTION INTRAVENOUS at 00:37

## 2024-10-30 RX ADMIN — Medication 1000 MILLIGRAM(S): at 21:27

## 2024-10-30 RX ADMIN — Medication 25 MICROGRAM(S): at 23:01

## 2024-10-30 RX ADMIN — CALCIUM GLUCONATE 200 GRAM(S): 98 INJECTION, SOLUTION INTRAVENOUS at 22:29

## 2024-10-30 RX ADMIN — ALBUMIN HUMAN 125 MILLILITER(S): 50 SOLUTION INTRAVENOUS at 00:38

## 2024-10-30 RX ADMIN — Medication 0.5 MILLIGRAM(S): at 04:45

## 2024-10-30 RX ADMIN — Medication 400 MILLIGRAM(S): at 21:50

## 2024-10-30 RX ADMIN — Medication 25 MICROGRAM(S): at 14:55

## 2024-10-30 RX ADMIN — Medication 25 MICROGRAM(S): at 15:55

## 2024-10-30 RX ADMIN — Medication 1000 MILLIGRAM(S): at 15:00

## 2024-10-30 RX ADMIN — Medication 400 MILLIGRAM(S): at 20:27

## 2024-10-30 RX ADMIN — VANCOMYCIN HYDROCHLORIDE 300 MILLIGRAM(S): KIT at 18:45

## 2024-10-30 RX ADMIN — Medication 2 UNIT(S)/HR: at 20:29

## 2024-10-30 RX ADMIN — DEXMEDETOMIDINE HYDROCHLORIDE 5.35 MICROGRAM(S)/KG/HR: 400 INJECTION, SOLUTION INTRAVENOUS at 18:44

## 2024-10-30 RX ADMIN — Medication 400 MILLIGRAM(S): at 07:00

## 2024-10-30 RX ADMIN — Medication 0.5 MILLIGRAM(S): at 02:09

## 2024-10-30 RX ADMIN — DEXAMETHASONE 1.5 MG 4 MILLIGRAM(S): 1.5 TABLET ORAL at 14:05

## 2024-10-30 RX ADMIN — DEXMEDETOMIDINE HYDROCHLORIDE 5.35 MICROGRAM(S)/KG/HR: 400 INJECTION, SOLUTION INTRAVENOUS at 23:57

## 2024-10-30 RX ADMIN — Medication 1000 MILLIGRAM(S): at 08:00

## 2024-10-30 RX ADMIN — PROPOFOL 22.5 MICROGRAM(S)/KG/MIN: 10 INJECTION, EMULSION INTRAVENOUS at 00:37

## 2024-10-30 RX ADMIN — CHLORHEXIDINE GLUCONATE 15 MILLILITER(S): 40 SOLUTION TOPICAL at 17:47

## 2024-10-30 RX ADMIN — Medication 400 MILLIGRAM(S): at 14:00

## 2024-10-30 RX ADMIN — Medication 0.5 MILLIGRAM(S): at 04:15

## 2024-10-30 RX ADMIN — Medication 100 MILLIEQUIVALENT(S): at 16:00

## 2024-10-30 RX ADMIN — CALCIUM GLUCONATE 200 GRAM(S): 98 INJECTION, SOLUTION INTRAVENOUS at 00:00

## 2024-10-30 RX ADMIN — Medication 400 MILLIGRAM(S): at 05:46

## 2024-10-30 RX ADMIN — ALBUMIN HUMAN 125 MILLILITER(S): 50 SOLUTION INTRAVENOUS at 02:30

## 2024-10-30 RX ADMIN — Medication 2 UNIT(S)/HR: at 00:37

## 2024-10-30 RX ADMIN — Medication 40 MILLIGRAM(S): at 07:00

## 2024-10-30 NOTE — PROGRESS NOTE ADULT - SUBJECTIVE AND OBJECTIVE BOX
RADHA PALOMARES  MRN-[id number]    HPI:  38 y/o male with no pertinent past medical history presents to  with c/o chest tightness and pain, as per  chart patient described pain as a "squeezing" sensation across chest radiating down to leg. Pt reports that the pain woke him up at around 2am last night10/29 . He reportedly went to Urgent Care thought pain was muscular so was prescribed muscle relaxer. In  ED patient Pt had CTA chest significant for Aortic dissection which is noted from the level of the root of the aorta extending into the ascending, aortic arch, descending thoracic aorta as well as extending into the abdominal aorta and is noted to end in the distal left common iliac artery. The dissection flap is in close proximity to the right coronary artery. The left renal artery is arising from the false lumen. Pt being transferred to [facility] going directly to the OR admitted under Dr. Grimm  (29 Oct 2024 14:47)      Surgery/Hospital Course:  Repair of type A aortic dissection 29-Oct-2024   Bentall Procedure with reconstruction of RCA -  Today:  No acute events     ICU Vital Signs Last 24 Hrs  T(C): 38.6 (30 Oct 2024 10:45), Max: 38.6 (30 Oct 2024 10:45)  T(F): 101.5 (30 Oct 2024 10:45), Max: 101.5 (30 Oct 2024 10:45)  HR: 86 (30 Oct 2024 13:30) (71 - 125)  BP: 163/91 (30 Oct 2024 13:00) (117/92 - 168/71)  BP(mean): 111 (30 Oct 2024 13:00) (79 - 111)  ABP: 123/62 (30 Oct 2024 13:30) (95/66 - 188/90)  ABP(mean): 78 (30 Oct 2024 13:30) (63 - 121)  RR: 15 (30 Oct 2024 13:30) (0 - 21)  SpO2: 94% (30 Oct 2024 13:30) (84% - 100%)    O2 Parameters below as of 30 Oct 2024 11:00  Patient On (Oxygen Delivery Method): ventilator    O2 Concentration (%): 50        Physical Exam:  Gen: Sedated	  Neck: no JVD  RES : clear , no wheezing              CVS: Regular  rhythm. Normal S1/S2  Abd: Soft, non-distended. Bowel sounds present.  Skin: No rash.  Ext:  no edema    ============================I/O===========================   I&O's Detail    29 Oct 2024 07:01  -  30 Oct 2024 07:00  --------------------------------------------------------  IN:    Albumin 5%  - 250 mL: 750 mL    Dexmedetomidine: 35 mL    DOBUTamine: 64 mL    EPINEPHrine: 8 mL    Insulin: 22.5 mL    IV PiggyBack: 100 mL    IV PiggyBack: 50 mL    NiCARdipine: 50 mL    Norepinephrine: 10 mL    Propofol: 55 mL    sodium chloride 0.9%: 40 mL    sodium chloride 0.9%: 80 mL    Vasopressin: 6 mL  Total IN: 1270.5 mL    OUT:    Chest Tube (mL): 100 mL    Chest Tube (mL): 110 mL    Indwelling Catheter - Urethral (mL): 870 mL  Total OUT: 1080 mL    Total NET: 190.5 mL      30 Oct 2024 07:01  -  30 Oct 2024 14:03  --------------------------------------------------------  IN:    Dexmedetomidine: 8 mL    DOBUTamine: 38.4 mL    Insulin: 21 mL    sodium chloride 0.9%: 60 mL    sodium chloride 0.9%: 30 mL  Total IN: 157.4 mL    OUT:    Chest Tube (mL): 40 mL    Chest Tube (mL): 80 mL    Indwelling Catheter - Urethral (mL): 1100 mL  Total OUT: 1220 mL    Total NET: -1062.6 mL        ============================ LABS =========================                        12.1   15.58 )-----------( 192      ( 30 Oct 2024 02:00 )             35.2     10-30    147[H]  |  110[H]  |  19.1  ----------------------------<  177[H]  4.2   |  21.0[L]  |  1.42[H]    Ca    8.8      30 Oct 2024 02:00  Mg     2.5     10-30    TPro  5.2[L]  /  Alb  3.3  /  TBili  1.3  /  DBili  x   /  AST  120[H]  /  ALT  35  /  AlkPhos  59  10-30    LIVER FUNCTIONS - ( 30 Oct 2024 02:00 )  Alb: 3.3 g/dL / Pro: 5.2 g/dL / ALK PHOS: 59 U/L / ALT: 35 U/L / AST: 120 U/L / GGT: x           PT/INR - ( 29 Oct 2024 13:26 )   PT: 10.7 sec;   INR: 0.91 ratio         PTT - ( 29 Oct 2024 13:26 )  PTT:32.3 sec  ABG - ( 30 Oct 2024 10:23 )  pH, Arterial: 7.450 pH, Blood: x     /  pCO2: 40    /  pO2: 75    / HCO3: 28    / Base Excess: 3.8   /  SaO2: 98.0              Urinalysis Basic - ( 30 Oct 2024 02:00 )    Color: x / Appearance: x / SG: x / pH: x  Gluc: 177 mg/dL / Ketone: x  / Bili: x / Urobili: x   Blood: x / Protein: x / Nitrite: x   Leuk Esterase: x / RBC: x / WBC x   Sq Epi: x / Non Sq Epi: x / Bacteria: x      ======================Micro/Rad/Cardio=================  Culture: Reviewed   CXR: Reviewed  Echo:Reviewed  ======================================================  PAST MEDICAL & SURGICAL HISTORY:  No pertinent past medical history      No pertinent past medical history      No significant past surgical history      No significant past surgical history        ====================ASSESSMENT ==============    Dissection of ascending aorta and aortic arch-  S/p Repair of type A aortic dissection 29-Oct-2024          Bentall Procedure with reconstruction of RCA  Post op Hypovolemia  Post op respiratory insufficiency       Plan:  Invasive hemodynamic monitoring required for the following of MAP/SBP monitoring to ensure adequate CV support and to prevent decompensation    Beta blocker as tolerated by HR and SBP when able  Continue Dobutamine at 2 mcg IV gtt  BP control with cardene gtt  Aspirin daily  Amiodarone per ERP-400mg BID X 3days  Vitamin C per ERP for AF ppx  Respiratoty Status required the following of continuous pulse oximetry for support and to prevent decompensation   Chest PT and IS use with bedside nurse  Analgesic regimen to optimize function with Tylenol and Narcotics   Continue Diuresis  Continue daily Ferrous Sulfate and Folic acid  Continue GI ppx with Protonix and Senna  DVT ppx with Lovenox and SCD boots  Strict glucose control and management for SWI ppx , required adjustment of Insulin while following serial Glucose levels ad maintain euglycemia   Monitor Neuro status  Wean off vent    ====================== NEUROLOGY=====================  acetaminophen     Tablet .. 975 milliGRAM(s) Oral every 6 hours  acetaminophen   IVPB .. 1000 milliGRAM(s) IV Intermittent every 6 hours  dexMEDEtomidine Infusion 0.2 MICROgram(s)/kG/Hr (5.35 mL/Hr) IV Continuous <Continuous>  gabapentin 100 milliGRAM(s) Oral every 8 hours  HYDROmorphone  Injectable 0.5 milliGRAM(s) IV Push every 3 hours PRN Breakthrough Pain  oxyCODONE    IR 5 milliGRAM(s) Oral every 4 hours PRN Moderate Pain (4 - 6)  oxyCODONE    IR 10 milliGRAM(s) Oral every 4 hours PRN Severe Pain (7 - 10)    ==================== RESPIRATORY======================  Post op respiratory insufficiency  Mechanical Ventilation:  Mode: SIMV (Synchronized Intermittent Mandatory Ventilation)  RR (machine): 10  TV (machine): 800  FiO2: 50  PEEP: 6  PS: 12  MAP: 14  PIP: 32      ====================CARDIOVASCULAR==================  Post op Hypovolemia  aMIOdarone    Tablet 400 milliGRAM(s) Oral two times a day  DOBUTamine Infusion 2 MICROgram(s)/kG/Min (6.42 mL/Hr) IV Continuous <Continuous>  furosemide   Injectable 40 milliGRAM(s) IV Push daily  niCARdipine Infusion 2.5 mG/Hr (12.5 mL/Hr) IV Continuous <Continuous>    ===================HEMATOLOGIC/ONC ===================  Monitor H&H/Plts    aspirin  chewable 81 milliGRAM(s) Oral daily    ===================== RENAL =========================  Continue monitoring urine output, I&OS, BUN/Cr     ==================== GASTROINTESTINAL===================  albumin human  5% IVPB 250 milliLiter(s) IV Intermittent once  ascorbic acid 500 milliGRAM(s) Oral two times a day  polyethylene glycol 3350 17 Gram(s) Oral daily  potassium chloride  10 mEq/50 mL IVPB 10 milliEquivalent(s) IV Intermittent every 1 hour  potassium chloride  10 mEq/50 mL IVPB 10 milliEquivalent(s) IV Intermittent every 1 hour  potassium chloride  10 mEq/50 mL IVPB 10 milliEquivalent(s) IV Intermittent every 1 hour  senna 2 Tablet(s) Oral at bedtime  sodium chloride 0.9%. 1000 milliLiter(s) (10 mL/Hr) IV Continuous <Continuous>  sodium chloride 0.9%. 1000 milliLiter(s) (5 mL/Hr) IV Continuous <Continuous>    =======================    ENDOCRINE  =====================  dexAMETHasone  Injectable 4 milliGRAM(s) IV Push every 8 hours  dextrose 50% Injectable 50 milliLiter(s) IV Push every 15 minutes  dextrose 50% Injectable 25 milliLiter(s) IV Push every 15 minutes  insulin regular Infusion 2 Unit(s)/Hr (2 mL/Hr) IV Continuous <Continuous>    ========================INFECTIOUS DISEASE================      -Close hemodynamic , ventilatory and drain monitoring and management per post op routine .  -Monitor Neurologic status ,   -Head of the bed should remain elevated to 45 degrees,  -Monitor adequacy of oxygenation and ventilation and attempt to wean oxygen ,  -Monitor for arrhythmias and monitor parameters for organ perfusion,  -Glycemic control is satisfactory,  -Nutritional goals will be met using po eventually , insure adequate caloric intake and monitor the same ,  -Electrolytes have been repleted as necessary , pain control has been achieved  and wound care has been carried out ,  -Stress ulcer and VTE prophylaxis will be achieved,  -Agressive PT and early mobility and ambulation goals will be met,  -The family was updated about the course and plan .      I have spent 105 minutes providing acute care for this critically ill patient     Patient requires continuous monitoring with bedside rhythm monitoring, pulse ox monitoring, and intermittent blood gas analysis. Care plan discussed with ICU care team. Patient remained critical and at risk for life threatening decompensation.

## 2024-10-30 NOTE — PROGRESS NOTE ADULT - SUBJECTIVE AND OBJECTIVE BOX
CRITICAL CARE ATTENDING - CTICU    MEDICATIONS  (STANDING):  acetaminophen     Tablet .. 975 milliGRAM(s) Oral every 6 hours  acetaminophen   IVPB .. 1000 milliGRAM(s) IV Intermittent every 6 hours  albumin human  5% IVPB 250 milliLiter(s) IV Intermittent once  aMIOdarone    Tablet 400 milliGRAM(s) Oral two times a day  ascorbic acid 500 milliGRAM(s) Oral two times a day  aspirin  chewable 81 milliGRAM(s) Oral daily  chlorhexidine 0.12% Liquid 15 milliLiter(s) Oral Mucosa every 12 hours  dexAMETHasone  Injectable 4 milliGRAM(s) IV Push every 8 hours  dexMEDEtomidine Infusion 0.2 MICROgram(s)/kG/Hr (5.35 mL/Hr) IV Continuous <Continuous>  dextrose 50% Injectable 50 milliLiter(s) IV Push every 15 minutes  dextrose 50% Injectable 25 milliLiter(s) IV Push every 15 minutes  DOBUTamine Infusion 2 MICROgram(s)/kG/Min (6.42 mL/Hr) IV Continuous <Continuous>  furosemide   Injectable 40 milliGRAM(s) IV Push daily  gabapentin 100 milliGRAM(s) Oral every 8 hours  influenza   Vaccine 0.5 milliLiter(s) IntraMuscular once  insulin regular Infusion 2 Unit(s)/Hr (2 mL/Hr) IV Continuous <Continuous>  niCARdipine Infusion 2.5 mG/Hr (12.5 mL/Hr) IV Continuous <Continuous>  polyethylene glycol 3350 17 Gram(s) Oral daily  potassium chloride  10 mEq/50 mL IVPB 10 milliEquivalent(s) IV Intermittent every 1 hour  potassium chloride  10 mEq/50 mL IVPB 10 milliEquivalent(s) IV Intermittent every 1 hour  potassium chloride  10 mEq/50 mL IVPB 10 milliEquivalent(s) IV Intermittent every 1 hour  potassium chloride  10 mEq/50 mL IVPB 10 milliEquivalent(s) IV Intermittent every 1 hour  senna 2 Tablet(s) Oral at bedtime  sodium chloride 0.9%. 1000 milliLiter(s) (10 mL/Hr) IV Continuous <Continuous>  sodium chloride 0.9%. 1000 milliLiter(s) (5 mL/Hr) IV Continuous <Continuous>                                    12.1   15.58 )-----------( 192      ( 30 Oct 2024 02:00 )             35.2       10-30    147[H]  |  110[H]  |  19.1  ----------------------------<  177[H]  4.2   |  21.0[L]  |  1.42[H]    Ca    8.8      30 Oct 2024 02:00  Mg     2.5     10-30    TPro  5.2[L]  /  Alb  3.3  /  TBili  1.3  /  DBili  x   /  AST  120[H]  /  ALT  35  /  AlkPhos  59  1030      PT/INR - ( 29 Oct 2024 13:26 )   PT: 10.7 sec;   INR: 0.91 ratio         PTT - ( 29 Oct 2024 13:26 )  PTT:32.3 sec    Mode: SIMV (Synchronized Intermittent Mandatory Ventilation)  RR (machine): 10  TV (machine): 800  FiO2: 50  PEEP: 6  PS: 12  MAP: 14  PIP: 32      Daily Height in cm: 170.18 (29 Oct 2024 23:45)    Daily Weight in k.6 (30 Oct 2024 04:47)      10-29 @ :01  -  10-30 @ 07:00  --------------------------------------------------------  IN: 1270.5 mL / OUT: 1080 mL / NET: 190.5 mL    10-30 @ 07:01  -  10-30 @ 16:05  --------------------------------------------------------  IN: 400.9 mL / OUT: 1510 mL / NET: -1109.1 mL        Critically Ill patient  : [ ] preoperative ,   [ x] post operative    Requires :  [x ] Arterial Line   [ x] Central Line  [x ] PA catheter  [ ] IABP  [ ] ECMO  [ ] LVAD  [x ] Ventilator  [ x] pacemaker - TPM  [ ] Impella.                      [ x] ABG's     [x] Pulse Oxymetry Monitoring  Bedside evaluation , monitoring , treatment of hemodynamics , fluids , IVP/ IVCD meds.        Diagnosis:     POD 1 - repair Type A Aortic Dissection / RCA reimplantation     Hypotension a/w Hypertension, requiring intravenous medication.     Fluid  overload     Hemodynamic lability,  instability. Requires IVCD [ x] vasopressors - on/off  [x ] inotropes  [x ] vasodilator  [ x]IVSS fluid  to maintain MAP, perfusion, C.I.     CHF- acute [ x]   chronic [ ]    systolic [ x]   diastolic [ ]  Valvular [ ]          - Echo- EF -             [ x] RV dysfunction          - Cxr-cardiomegally, edema          - Clinical-  [x ]inotropes   [x ]pressors   [x ]diuresis   [ ]IABP   [ ]ECMO   [ ]LVAD   [x ] Respiratory insuffiencey     Respiratory insuffiencey     Remains Intubated post op     Ventilator Management:  [x ]AC-rest    [ ]CPAP-PS Wean    [ ]Trach Collar     [ ]Extubate    [ ] T-Piece  [ ]peep>5     Requires chest PT, pulmonary toilet, ambu bagging, suctioning to maintain SaO2,  patent airway and treat atelectasis.     Hypernatremia     Renal Failure - Acute Kidney Injury     metabolic Acidosis    IVCD Insulin     Post op Encephalopathy     Temporary pacemaker (TPM) interrogation and setting.     Chest Tube Drainage / Management     Requires bedside physical therapy, mobilization and total intermediate care.                     -                     Discussed with CT surgeon, Physician's Assistant - Nurse Practitioner- Critical care medicine team.   Discussed at  AM / PM rounds.   Chart, labs , films reviewed.    Cumulative Critical Care Time Given Today : 60 min

## 2024-10-30 NOTE — PATIENT PROFILE ADULT - FALL HARM RISK - HARM RISK INTERVENTIONS

## 2024-10-31 LAB
ALBUMIN SERPL ELPH-MCNC: 3.4 G/DL — SIGNIFICANT CHANGE UP (ref 3.3–5.2)
ALP SERPL-CCNC: 58 U/L — SIGNIFICANT CHANGE UP (ref 40–120)
ALT FLD-CCNC: 28 U/L — SIGNIFICANT CHANGE UP
ANION GAP SERPL CALC-SCNC: 10 MMOL/L — SIGNIFICANT CHANGE UP (ref 5–17)
ANION GAP SERPL CALC-SCNC: 29 MMOL/L — HIGH (ref 5–17)
ANION GAP SERPL CALC-SCNC: 9 MMOL/L — SIGNIFICANT CHANGE UP (ref 5–17)
AST SERPL-CCNC: 39 U/L — SIGNIFICANT CHANGE UP
BASOPHILS # BLD AUTO: 0.02 K/UL — SIGNIFICANT CHANGE UP (ref 0–0.2)
BASOPHILS NFR BLD AUTO: 0.1 % — SIGNIFICANT CHANGE UP (ref 0–2)
BILIRUB SERPL-MCNC: 0.5 MG/DL — SIGNIFICANT CHANGE UP (ref 0.4–2)
BUN SERPL-MCNC: 27.9 MG/DL — HIGH (ref 8–20)
BUN SERPL-MCNC: 30.5 MG/DL — HIGH (ref 8–20)
BUN SERPL-MCNC: 33.7 MG/DL — HIGH (ref 8–20)
CALCIUM SERPL-MCNC: 8.1 MG/DL — LOW (ref 8.4–10.5)
CALCIUM SERPL-MCNC: 8.3 MG/DL — LOW (ref 8.4–10.5)
CALCIUM SERPL-MCNC: 8.6 MG/DL — SIGNIFICANT CHANGE UP (ref 8.4–10.5)
CHLORIDE SERPL-SCNC: 106 MMOL/L — SIGNIFICANT CHANGE UP (ref 96–108)
CHLORIDE SERPL-SCNC: 109 MMOL/L — HIGH (ref 96–108)
CHLORIDE SERPL-SCNC: 110 MMOL/L — HIGH (ref 96–108)
CK MB CFR SERPL CALC: 5.3 NG/ML — SIGNIFICANT CHANGE UP (ref 0–6.7)
CK SERPL-CCNC: 737 U/L — HIGH (ref 30–200)
CO2 SERPL-SCNC: 12 MMOL/L — LOW (ref 22–29)
CO2 SERPL-SCNC: 27 MMOL/L — SIGNIFICANT CHANGE UP (ref 22–29)
CO2 SERPL-SCNC: 27 MMOL/L — SIGNIFICANT CHANGE UP (ref 22–29)
CREAT SERPL-MCNC: 1 MG/DL — SIGNIFICANT CHANGE UP (ref 0.5–1.3)
CREAT SERPL-MCNC: 1 MG/DL — SIGNIFICANT CHANGE UP (ref 0.5–1.3)
CREAT SERPL-MCNC: 1.43 MG/DL — HIGH (ref 0.5–1.3)
EGFR: 65 ML/MIN/1.73M2 — SIGNIFICANT CHANGE UP
EGFR: 99 ML/MIN/1.73M2 — SIGNIFICANT CHANGE UP
EGFR: 99 ML/MIN/1.73M2 — SIGNIFICANT CHANGE UP
EOSINOPHIL # BLD AUTO: 0 K/UL — SIGNIFICANT CHANGE UP (ref 0–0.5)
EOSINOPHIL NFR BLD AUTO: 0 % — SIGNIFICANT CHANGE UP (ref 0–6)
GAS PNL BLDA: SIGNIFICANT CHANGE UP
GAS PNL BLDV: SIGNIFICANT CHANGE UP
GLUCOSE BLDC GLUCOMTR-MCNC: 103 MG/DL — HIGH (ref 70–99)
GLUCOSE BLDC GLUCOMTR-MCNC: 116 MG/DL — HIGH (ref 70–99)
GLUCOSE BLDC GLUCOMTR-MCNC: 121 MG/DL — HIGH (ref 70–99)
GLUCOSE BLDC GLUCOMTR-MCNC: 123 MG/DL — HIGH (ref 70–99)
GLUCOSE BLDC GLUCOMTR-MCNC: 124 MG/DL — HIGH (ref 70–99)
GLUCOSE BLDC GLUCOMTR-MCNC: 124 MG/DL — HIGH (ref 70–99)
GLUCOSE BLDC GLUCOMTR-MCNC: 128 MG/DL — HIGH (ref 70–99)
GLUCOSE BLDC GLUCOMTR-MCNC: 131 MG/DL — HIGH (ref 70–99)
GLUCOSE BLDC GLUCOMTR-MCNC: 134 MG/DL — HIGH (ref 70–99)
GLUCOSE BLDC GLUCOMTR-MCNC: 139 MG/DL — HIGH (ref 70–99)
GLUCOSE BLDC GLUCOMTR-MCNC: 141 MG/DL — HIGH (ref 70–99)
GLUCOSE BLDC GLUCOMTR-MCNC: 74 MG/DL — SIGNIFICANT CHANGE UP (ref 70–99)
GLUCOSE SERPL-MCNC: 122 MG/DL — HIGH (ref 70–99)
GLUCOSE SERPL-MCNC: 128 MG/DL — HIGH (ref 70–99)
GLUCOSE SERPL-MCNC: 177 MG/DL — HIGH (ref 70–99)
HCT VFR BLD CALC: 29.6 % — LOW (ref 39–50)
HCT VFR BLD CALC: 30.9 % — LOW (ref 39–50)
HCT VFR BLD CALC: 34.1 % — LOW (ref 39–50)
HGB BLD-MCNC: 10.4 G/DL — LOW (ref 13–17)
HGB BLD-MCNC: 10.9 G/DL — LOW (ref 13–17)
HGB BLD-MCNC: 9.9 G/DL — LOW (ref 13–17)
IMM GRANULOCYTES NFR BLD AUTO: 0.9 % — SIGNIFICANT CHANGE UP (ref 0–0.9)
LYMPHOCYTES # BLD AUTO: 1.2 K/UL — SIGNIFICANT CHANGE UP (ref 1–3.3)
LYMPHOCYTES # BLD AUTO: 6.9 % — LOW (ref 13–44)
MAGNESIUM SERPL-MCNC: 2.5 MG/DL — SIGNIFICANT CHANGE UP (ref 1.6–2.6)
MAGNESIUM SERPL-MCNC: 2.5 MG/DL — SIGNIFICANT CHANGE UP (ref 1.6–2.6)
MAGNESIUM SERPL-MCNC: 2.8 MG/DL — HIGH (ref 1.6–2.6)
MCHC RBC-ENTMCNC: 28.4 PG — SIGNIFICANT CHANGE UP (ref 27–34)
MCHC RBC-ENTMCNC: 28.7 PG — SIGNIFICANT CHANGE UP (ref 27–34)
MCHC RBC-ENTMCNC: 28.7 PG — SIGNIFICANT CHANGE UP (ref 27–34)
MCHC RBC-ENTMCNC: 32 G/DL — SIGNIFICANT CHANGE UP (ref 32–36)
MCHC RBC-ENTMCNC: 33.4 G/DL — SIGNIFICANT CHANGE UP (ref 32–36)
MCHC RBC-ENTMCNC: 33.7 G/DL — SIGNIFICANT CHANGE UP (ref 32–36)
MCV RBC AUTO: 84.4 FL — SIGNIFICANT CHANGE UP (ref 80–100)
MCV RBC AUTO: 85.8 FL — SIGNIFICANT CHANGE UP (ref 80–100)
MCV RBC AUTO: 89.7 FL — SIGNIFICANT CHANGE UP (ref 80–100)
MONOCYTES # BLD AUTO: 2 K/UL — HIGH (ref 0–0.9)
MONOCYTES NFR BLD AUTO: 11.5 % — SIGNIFICANT CHANGE UP (ref 2–14)
NEUTROPHILS # BLD AUTO: 14.06 K/UL — HIGH (ref 1.8–7.4)
NEUTROPHILS NFR BLD AUTO: 80.6 % — HIGH (ref 43–77)
PLATELET # BLD AUTO: 143 K/UL — LOW (ref 150–400)
PLATELET # BLD AUTO: 161 K/UL — SIGNIFICANT CHANGE UP (ref 150–400)
PLATELET # BLD AUTO: 196 K/UL — SIGNIFICANT CHANGE UP (ref 150–400)
POTASSIUM SERPL-MCNC: 4 MMOL/L — SIGNIFICANT CHANGE UP (ref 3.5–5.3)
POTASSIUM SERPL-MCNC: 4.2 MMOL/L — SIGNIFICANT CHANGE UP (ref 3.5–5.3)
POTASSIUM SERPL-MCNC: 4.3 MMOL/L — SIGNIFICANT CHANGE UP (ref 3.5–5.3)
POTASSIUM SERPL-SCNC: 4 MMOL/L — SIGNIFICANT CHANGE UP (ref 3.5–5.3)
POTASSIUM SERPL-SCNC: 4.2 MMOL/L — SIGNIFICANT CHANGE UP (ref 3.5–5.3)
POTASSIUM SERPL-SCNC: 4.3 MMOL/L — SIGNIFICANT CHANGE UP (ref 3.5–5.3)
PROT SERPL-MCNC: 6 G/DL — LOW (ref 6.6–8.7)
RBC # BLD: 3.45 M/UL — LOW (ref 4.2–5.8)
RBC # BLD: 3.66 M/UL — LOW (ref 4.2–5.8)
RBC # BLD: 3.8 M/UL — LOW (ref 4.2–5.8)
RBC # FLD: 14.4 % — SIGNIFICANT CHANGE UP (ref 10.3–14.5)
RBC # FLD: 14.6 % — HIGH (ref 10.3–14.5)
RBC # FLD: 14.6 % — HIGH (ref 10.3–14.5)
SODIUM SERPL-SCNC: 145 MMOL/L — SIGNIFICANT CHANGE UP (ref 135–145)
SODIUM SERPL-SCNC: 147 MMOL/L — HIGH (ref 135–145)
SODIUM SERPL-SCNC: 147 MMOL/L — HIGH (ref 135–145)
WBC # BLD: 14.56 K/UL — HIGH (ref 3.8–10.5)
WBC # BLD: 17.43 K/UL — HIGH (ref 3.8–10.5)
WBC # BLD: 25.84 K/UL — HIGH (ref 3.8–10.5)
WBC # FLD AUTO: 14.56 K/UL — HIGH (ref 3.8–10.5)
WBC # FLD AUTO: 17.43 K/UL — HIGH (ref 3.8–10.5)
WBC # FLD AUTO: 25.84 K/UL — HIGH (ref 3.8–10.5)

## 2024-10-31 PROCEDURE — 70498 CT ANGIOGRAPHY NECK: CPT | Mod: 26

## 2024-10-31 PROCEDURE — 99233 SBSQ HOSP IP/OBS HIGH 50: CPT

## 2024-10-31 PROCEDURE — 70496 CT ANGIOGRAPHY HEAD: CPT | Mod: 26

## 2024-10-31 PROCEDURE — 93010 ELECTROCARDIOGRAM REPORT: CPT

## 2024-10-31 PROCEDURE — 95720 EEG PHY/QHP EA INCR W/VEEG: CPT

## 2024-10-31 PROCEDURE — 99292 CRITICAL CARE ADDL 30 MIN: CPT

## 2024-10-31 PROCEDURE — 71045 X-RAY EXAM CHEST 1 VIEW: CPT | Mod: 26

## 2024-10-31 PROCEDURE — 99292 CRITICAL CARE ADDL 30 MIN: CPT | Mod: 24

## 2024-10-31 PROCEDURE — 99291 CRITICAL CARE FIRST HOUR: CPT | Mod: 24

## 2024-10-31 RX ORDER — FENTANYL CITRAT/DEXTROSE 5%/PF 1250MCG/50
50 PATIENT CONTROLLED ANALGESIA SYRINGE INTRAVENOUS ONCE
Refills: 0 | Status: DISCONTINUED | OUTPATIENT
Start: 2024-10-31 | End: 2024-10-31

## 2024-10-31 RX ORDER — ACETAMINOPHEN 500 MG
1000 TABLET ORAL ONCE
Refills: 0 | Status: COMPLETED | OUTPATIENT
Start: 2024-10-31 | End: 2024-10-31

## 2024-10-31 RX ORDER — LEVETIRACETAM 500 MG/1
1000 TABLET, FILM COATED ORAL EVERY 12 HOURS
Refills: 0 | Status: DISCONTINUED | OUTPATIENT
Start: 2024-11-01 | End: 2024-11-01

## 2024-10-31 RX ORDER — PANTOPRAZOLE SODIUM 40 MG/1
40 TABLET, DELAYED RELEASE ORAL DAILY
Refills: 0 | Status: DISCONTINUED | OUTPATIENT
Start: 2024-10-31 | End: 2024-11-03

## 2024-10-31 RX ORDER — NICARDIPINE HCL 30 MG
5 CAPSULE, EXTENDED RELEASE ORAL
Qty: 40 | Refills: 0 | Status: DISCONTINUED | OUTPATIENT
Start: 2024-10-31 | End: 2024-11-03

## 2024-10-31 RX ORDER — INSULIN LISPRO 100/ML
VIAL (ML) SUBCUTANEOUS EVERY 6 HOURS
Refills: 0 | Status: DISCONTINUED | OUTPATIENT
Start: 2024-10-31 | End: 2024-11-02

## 2024-10-31 RX ORDER — DEXMEDETOMIDINE HYDROCHLORIDE 400 UG/100ML
1.5 INJECTION, SOLUTION INTRAVENOUS
Qty: 400 | Refills: 0 | Status: DISCONTINUED | OUTPATIENT
Start: 2024-10-31 | End: 2024-11-02

## 2024-10-31 RX ORDER — MIDAZOLAM IN 5 % DEXTROSE/PF 15 MG/30ML
1 SYRINGE (ML) INTRAVENOUS EVERY 8 HOURS
Refills: 0 | Status: DISCONTINUED | OUTPATIENT
Start: 2024-10-31 | End: 2024-11-01

## 2024-10-31 RX ORDER — POTASSIUM CHLORIDE 10 MEQ
10 TABLET, EXTENDED RELEASE ORAL
Refills: 0 | Status: COMPLETED | OUTPATIENT
Start: 2024-10-31 | End: 2024-10-31

## 2024-10-31 RX ORDER — PROPOFOL 10 MG/ML
3 INJECTION, EMULSION INTRAVENOUS ONCE
Refills: 0 | Status: COMPLETED | OUTPATIENT
Start: 2024-10-31 | End: 2024-10-31

## 2024-10-31 RX ORDER — PROPOFOL 10 MG/ML
10 INJECTION, EMULSION INTRAVENOUS
Qty: 1000 | Refills: 0 | Status: DISCONTINUED | OUTPATIENT
Start: 2024-10-31 | End: 2024-11-02

## 2024-10-31 RX ORDER — CHLORHEXIDINE GLUCONATE 40 MG/ML
1 SOLUTION TOPICAL DAILY
Refills: 0 | Status: DISCONTINUED | OUTPATIENT
Start: 2024-10-31 | End: 2024-11-11

## 2024-10-31 RX ORDER — LORAZEPAM 2 MG
2 TABLET ORAL
Refills: 0 | Status: DISCONTINUED | OUTPATIENT
Start: 2024-10-31 | End: 2024-10-31

## 2024-10-31 RX ORDER — LEVETIRACETAM 500 MG/1
2000 TABLET, FILM COATED ORAL ONCE
Refills: 0 | Status: COMPLETED | OUTPATIENT
Start: 2024-10-31 | End: 2024-10-31

## 2024-10-31 RX ADMIN — CHLORHEXIDINE GLUCONATE 1 APPLICATION(S): 40 SOLUTION TOPICAL at 13:14

## 2024-10-31 RX ADMIN — DEXMEDETOMIDINE HYDROCHLORIDE 5.35 MICROGRAM(S)/KG/HR: 400 INJECTION, SOLUTION INTRAVENOUS at 02:31

## 2024-10-31 RX ADMIN — PROPOFOL 6.42 MICROGRAM(S)/KG/MIN: 10 INJECTION, EMULSION INTRAVENOUS at 12:35

## 2024-10-31 RX ADMIN — PANTOPRAZOLE SODIUM 40 MILLIGRAM(S): 40 TABLET, DELAYED RELEASE ORAL at 12:44

## 2024-10-31 RX ADMIN — Medication 100 MILLIGRAM(S): at 05:02

## 2024-10-31 RX ADMIN — Medication 2 MILLIGRAM(S): at 21:20

## 2024-10-31 RX ADMIN — Medication 100 MILLIGRAM(S): at 23:12

## 2024-10-31 RX ADMIN — Medication 25 MICROGRAM(S): at 00:01

## 2024-10-31 RX ADMIN — Medication 50 MICROGRAM(S): at 03:30

## 2024-10-31 RX ADMIN — DEXMEDETOMIDINE HYDROCHLORIDE 5.35 MICROGRAM(S)/KG/HR: 400 INJECTION, SOLUTION INTRAVENOUS at 04:57

## 2024-10-31 RX ADMIN — VANCOMYCIN HYDROCHLORIDE 300 MILLIGRAM(S): KIT at 06:47

## 2024-10-31 RX ADMIN — VANCOMYCIN HYDROCHLORIDE 300 MILLIGRAM(S): KIT at 17:04

## 2024-10-31 RX ADMIN — Medication 100 MILLIGRAM(S): at 13:16

## 2024-10-31 RX ADMIN — Medication 100 MILLIEQUIVALENT(S): at 04:57

## 2024-10-31 RX ADMIN — Medication 2 MILLIGRAM(S): at 21:15

## 2024-10-31 RX ADMIN — PROPOFOL 3 MILLIGRAM(S): 10 INJECTION, EMULSION INTRAVENOUS at 21:20

## 2024-10-31 RX ADMIN — Medication 1000 MILLIGRAM(S): at 06:24

## 2024-10-31 RX ADMIN — Medication 12.5 MG/HR: at 02:32

## 2024-10-31 RX ADMIN — DEXMEDETOMIDINE HYDROCHLORIDE 5.35 MICROGRAM(S)/KG/HR: 400 INJECTION, SOLUTION INTRAVENOUS at 06:26

## 2024-10-31 RX ADMIN — Medication 1000 MILLIGRAM(S): at 19:30

## 2024-10-31 RX ADMIN — Medication 3.21 MICROGRAM(S)/KG/MIN: at 12:45

## 2024-10-31 RX ADMIN — Medication 50 MICROGRAM(S): at 02:31

## 2024-10-31 RX ADMIN — Medication 400 MILLIGRAM(S): at 18:30

## 2024-10-31 RX ADMIN — Medication 1 MILLIGRAM(S): at 21:40

## 2024-10-31 RX ADMIN — CHLORHEXIDINE GLUCONATE 15 MILLILITER(S): 40 SOLUTION TOPICAL at 17:04

## 2024-10-31 RX ADMIN — Medication 25 MG/HR: at 15:48

## 2024-10-31 RX ADMIN — Medication 400 MILLIGRAM(S): at 12:44

## 2024-10-31 RX ADMIN — GABAPENTIN 100 MILLIGRAM(S): 300 CAPSULE ORAL at 13:14

## 2024-10-31 RX ADMIN — Medication 500 MILLIGRAM(S): at 17:16

## 2024-10-31 RX ADMIN — Medication 400 MILLIGRAM(S): at 05:56

## 2024-10-31 RX ADMIN — DEXMEDETOMIDINE HYDROCHLORIDE 5.35 MICROGRAM(S)/KG/HR: 400 INJECTION, SOLUTION INTRAVENOUS at 15:48

## 2024-10-31 RX ADMIN — Medication 100 MILLIEQUIVALENT(S): at 05:56

## 2024-10-31 RX ADMIN — PROPOFOL 6.42 MICROGRAM(S)/KG/MIN: 10 INJECTION, EMULSION INTRAVENOUS at 12:43

## 2024-10-31 RX ADMIN — Medication 81 MILLIGRAM(S): at 12:44

## 2024-10-31 RX ADMIN — Medication 40 MILLIGRAM(S): at 05:00

## 2024-10-31 RX ADMIN — POLYETHYLENE GLYCOL 3350 17 GRAM(S): 17 POWDER, FOR SOLUTION ORAL at 09:34

## 2024-10-31 RX ADMIN — CHLORHEXIDINE GLUCONATE 15 MILLILITER(S): 40 SOLUTION TOPICAL at 05:00

## 2024-10-31 RX ADMIN — LEVETIRACETAM 600 MILLIGRAM(S): 500 TABLET, FILM COATED ORAL at 21:41

## 2024-10-31 RX ADMIN — Medication 2 UNIT(S)/HR: at 12:45

## 2024-10-31 RX ADMIN — PROPOFOL 6.42 MICROGRAM(S)/KG/MIN: 10 INJECTION, EMULSION INTRAVENOUS at 13:08

## 2024-10-31 NOTE — DIETITIAN INITIAL EVALUATION ADULT - PROBLEM SELECTOR PROBLEM 1
How Severe Is Your Skin Lesion?: mild
Have Your Skin Lesions Been Treated?: not been treated
Aortic dissection
Is This A New Presentation, Or A Follow-Up?: Skin Lesions

## 2024-10-31 NOTE — DIETITIAN INITIAL EVALUATION ADULT - PROBLEM SELECTOR PLAN 1
Pt transferred via [facility] and taken directly to OR for emergent repair of Type A dissection

## 2024-10-31 NOTE — DIETITIAN INITIAL EVALUATION ADULT - NSFNSGIIOFT_GEN_A_CORE
10-30-24 @ 07:01  -  10-31-24 @ 07:00  --------------------------------------------------------  OUT:    Chest Tube (mL): 310 mL    Chest Tube (mL): 140 mL    Nasogastric/Oral tube (mL): 150 mL  Total OUT: 600 mL    Total NET: -600 mL      10-31-24 @ 07:01  -  10-31-24 @ 10:15  --------------------------------------------------------  OUT:    Chest Tube (mL): 0 mL    Chest Tube (mL): 20 mL  Total OUT: 20 mL    Total NET: -20 mL

## 2024-10-31 NOTE — DIETITIAN INITIAL EVALUATION ADULT - PERTINENT MEDS FT
MEDICATIONS  (STANDING):  acetaminophen     Tablet .. 975 milliGRAM(s) Oral every 6 hours  albumin human  5% IVPB 250 milliLiter(s) IV Intermittent once  ascorbic acid 500 milliGRAM(s) Oral two times a day  aspirin  chewable 81 milliGRAM(s) Oral daily  bisacodyl Suppository 10 milliGRAM(s) Rectal once  cefuroxime  IVPB 1500 milliGRAM(s) IV Intermittent every 8 hours  chlorhexidine 0.12% Liquid 15 milliLiter(s) Oral Mucosa every 12 hours  dexMEDEtomidine Infusion 0.2 MICROgram(s)/kG/Hr (5.35 mL/Hr) IV Continuous <Continuous>  dextrose 50% Injectable 50 milliLiter(s) IV Push every 15 minutes  DOBUTamine Infusion 1 MICROgram(s)/kG/Min (3.21 mL/Hr) IV Continuous <Continuous>  furosemide   Injectable 40 milliGRAM(s) IV Push daily  gabapentin 100 milliGRAM(s) Oral every 8 hours  influenza   Vaccine 0.5 milliLiter(s) IntraMuscular once  insulin regular Infusion 2 Unit(s)/Hr (2 mL/Hr) IV Continuous <Continuous>  niCARdipine Infusion 5 mG/Hr (25 mL/Hr) IV Continuous <Continuous>  pantoprazole  Injectable 40 milliGRAM(s) IV Push daily  polyethylene glycol 3350 17 Gram(s) Oral daily  potassium chloride  10 mEq/50 mL IVPB 10 milliEquivalent(s) IV Intermittent every 1 hour  propofol Infusion 10 MICROgram(s)/kG/Min (6.42 mL/Hr) IV Continuous <Continuous>  senna 2 Tablet(s) Oral at bedtime  sodium chloride 0.9%. 1000 milliLiter(s) (10 mL/Hr) IV Continuous <Continuous>  vancomycin  IVPB 1500 milliGRAM(s) IV Intermittent every 12 hours

## 2024-10-31 NOTE — DIETITIAN INITIAL EVALUATION ADULT - OTHER INFO
Pt is a 38 y/o male with no pertinent past medical history presents to  with c/o chest tightness and pain, as per  chart patient described pain as a "squeezing" sensation across chest radiating down to leg. Pt reports that the pain woke him up at around 2am last night10/29 . He reportedly went to Urgent Care thought pain was muscular so was prescribed muscle relaxer. In  ED patient Pt had CTA chest significant for Aortic dissection which is noted from the level of the root of the aorta extending into the ascending, aortic arch, descending thoracic aorta as well as extending into the abdominal aorta and is noted to end in the distal left common iliac artery. The dissection flap is in close proximity to the right coronary artery. The left renal artery is arising from the false lumen. Pt transferred to [facility] going directly to the OR.   Pt is now s/p Repair of type A aortic dissection, Bentall Procedure with reconstruction of RCA -

## 2024-10-31 NOTE — CONSULT NOTE ADULT - SUBJECTIVE AND OBJECTIVE BOX
[facility]                                        [facility] [place]                                  Hany Becker, & Raghavendra                                      [address]                                           [place], [place], [address]                                                [phone]        CC: Aortic dissection and altered mental status. Rule out seizure.    HISTORY:  The patient is a 39y Male who presented to [facility] with severe back pain radiating to legs followed by chest pain. Imaging confirmed aortic dissection and he was transferred here for surgery on 10/29/24.  He has remained on mechanical ventilator since the surgery and has been poorly responsive when sedation held.   There was a question of twitching of the extremities and neurology evaluation requested 10/31/24.    PAST MEDICAL & SURGICAL HISTORY:  No pertinent past medical history  No significant past surgical history    MEDICATION PRIOR TO ADMISSION:  None.    MEDICATIONS  (STANDING):  acetaminophen     Tablet .. 975 milliGRAM(s) Oral every 6 hours  ascorbic acid 500 milliGRAM(s) Oral two times a day  aspirin  chewable 81 milliGRAM(s) Oral daily  bisacodyl Suppository 10 milliGRAM(s) Rectal once  cefuroxime  IVPB 1500 milliGRAM(s) IV Intermittent every 8 hours  chlorhexidine 0.12% Liquid 15 milliLiter(s) Oral Mucosa every 12 hours  chlorhexidine 2% Cloths 1 Application(s) Topical daily  dexMEDEtomidine Infusion 0.2 MICROgram(s)/kG/Hr (5.35 mL/Hr) IV Continuous <Continuous>  dextrose 50% Injectable 50 milliLiter(s) IV Push every 15 minutes  dextrose 50% Injectable 25 milliLiter(s) IV Push every 15 minutes  DOBUTamine Infusion 1 MICROgram(s)/kG/Min (3.21 mL/Hr) IV Continuous <Continuous>  furosemide   Injectable 40 milliGRAM(s) IV Push daily  gabapentin 100 milliGRAM(s) Oral every 8 hours  influenza   Vaccine 0.5 milliLiter(s) IntraMuscular once  insulin regular Infusion 2 Unit(s)/Hr (2 mL/Hr) IV Continuous <Continuous>  niCARdipine Infusion 5 mG/Hr (25 mL/Hr) IV Continuous <Continuous>  pantoprazole  Injectable 40 milliGRAM(s) IV Push daily  polyethylene glycol 3350 17 Gram(s) Oral daily  potassium chloride  10 mEq/50 mL IVPB 10 milliEquivalent(s) IV Intermittent every 1 hour  potassium chloride  10 mEq/50 mL IVPB 10 milliEquivalent(s) IV Intermittent every 1 hour  potassium chloride  10 mEq/50 mL IVPB 10 milliEquivalent(s) IV Intermittent every 1 hour  propofol Infusion 10 MICROgram(s)/kG/Min (6.42 mL/Hr) IV Continuous <Continuous>  senna 2 Tablet(s) Oral at bedtime  sodium chloride 0.9%. 1000 milliLiter(s) (10 mL/Hr) IV Continuous <Continuous>  sodium chloride 0.9%. 1000 milliLiter(s) (5 mL/Hr) IV Continuous <Continuous>  vancomycin  IVPB 1500 milliGRAM(s) IV Intermittent every 12 hours    MEDICATIONS  (PRN):  oxyCODONE    IR 10 milliGRAM(s) Oral every 4 hours PRN Severe Pain (7 - 10)  oxyCODONE    IR 5 milliGRAM(s) Oral every 4 hours PRN Moderate Pain (4 - 6)      Allergies  Allergy Status Unknown  No Known Allergies    SOCIAL HISTORY:  Non smoker.     FAMILY HISTORY:  Unobtainable due to patient's condition.     ROS:  Constitutional: Unobtainable due to patient's condition.   Neuro: Unobtainable due to patient's condition.   Eyes: Unobtainable due to patient's condition.   Ears/nose/throat: Unobtainable due to patient's condition.   Cardiac: Unobtainable due to patient's condition.   Respiratory: Unobtainable due to patient's condition.   GI: Unobtainable due to patient's condition.   : Unobtainable due to patient's condition..  Integumentary: Unobtainable due to patient's condition.  Psych: Unobtainable due to patient's condition.  Heme: Unobtainable due to patient's condition.     Exam:  Vital Signs Last 24 Hrs  T(C): 38.1 (31 Oct 2024 13:00), Max: 38.4 (30 Oct 2024 20:00)  T(F): 100.6 (31 Oct 2024 13:00), Max: 101.1 (30 Oct 2024 20:00)  HR: 75 (31 Oct 2024 13:00) (70 - 116)  BP: 134/66 (30 Oct 2024 20:00) (124/64 - 134/66)  BP(mean): 82 (30 Oct 2024 20:00) (80 - 82)  RR: 11 (31 Oct 2024 13:00) (10 - 33)  SpO2: 100% (31 Oct 2024 13:00) (73% - 100%)    Parameters below as of 31 Oct 2024 12:00  Patient On (Oxygen Delivery Method): ventilator    General: NAD.   Carotid bruits absent.     Mental status: The patient is unresponsive on mechanical ventilator.     Cranial nerves: There is no papilledema. Pupils react symmetrically to light. No blink to threat from either side. Not tracking with gaze. Eyes move slightly to oculocephalic maneuvers. Face grossly symmetric although an endotracheal tube is in place. Palate and tongue cannot be assessed.     Motor/Sensory: There is normal bulk and tone.  Withdraws right side more rigorously than left to stimuli.    Reflexes: 1+ throughout and plantar responses are flexor.    Cerebellar: Cannot be tested.     LABS:                         9.9    14.56 )-----------( 143      ( 31 Oct 2024 12:40 )             29.6       10-31    145  |  109[H]  |  30.5[H]  ----------------------------<  122[H]  4.3   |  27.0  |  1.00    Ca    8.3[L]      31 Oct 2024 12:40  Mg     2.5     10-31    TPro  5.4[L]  /  Alb  3.3  /  TBili  0.6  /  DBili  x   /  AST  81[H]  /  ALT  31  /  AlkPhos  49  10-30    RADIOLOGY   CT head images reviewed (and concur with report): There is no acute pathology.

## 2024-10-31 NOTE — PROGRESS NOTE ADULT - SUBJECTIVE AND OBJECTIVE BOX
RADHA PALOMARES  [id number]    HPI:  38 y/o male with no pertinent past medical history presents to  with c/o chest tightness and pain, as per  chart patient described pain as a "squeezing" sensation across chest radiating down to leg. Pt reports that the pain woke him up at around 2am last night10/29 . He reportedly went to Urgent Care thought pain was muscular so was prescribed muscle relaxer. In  ED patient Pt had CTA chest significant for Aortic dissection which is noted from the level of the root of the aorta extending into the ascending, aortic arch, descending thoracic aorta as well as extending into the abdominal aorta and is noted to end in the distal left common iliac artery. The dissection flap is in close proximity to the right coronary artery. The left renal artery is arising from the false lumen. Pt S/P emergent type A aortic dissection repair.      Surgery/Hospital Course:  Repair of type A aortic dissection 29-Oct-2024   [facility] Procedure with reconstruction of RCA -  Today:  Still with severelt depressed mental status    -CT Angio Neck w/ IV Cont (10.31.24 )  CT brain:  No hydrocephalus, acute intracranial hemorrhage, mass effect, or brain   edema.  CTA brain:  No flow-limiting stenosis or vascular aneurysm. No AVM.  Venous system is well opacified, no evidence for venoussinus or cortical   vein thrombosis.  CTA neck:  Status post repair of the proximal aspect of the previously seen left   aortic dissection. New edematous changes within the mediastinum. New   bibasilar atelectasis.  Residual false lumen supplies the left common carotid and subclavian   arteries.  No flow-limiting stenosis or vascular aneurysm.    EEG preliminary: right sided cerebral dysfunction no seizures    ICU Vital Signs Last 24 Hrs  T(C): 38.4 (31 Oct 2024 18:15), Max: 38.6 (31 Oct 2024 17:15)  T(F): 101.1 (31 Oct 2024 18:15), Max: 101.5 (31 Oct 2024 17:15)  HR: 72 (31 Oct 2024 18:15) (70 - 100)  BP: 134/66 (30 Oct 2024 20:00) (134/66 - 134/66)  BP(mean): 82 (30 Oct 2024 20:00) (82 - 82)  ABP: 158/71 (31 Oct 2024 18:15) (117/53 - 186/77)  ABP(mean): 96 (31 Oct 2024 18:15) (71 - 278)  RR: 13 (31 Oct 2024 18:15) (10 - 33)  SpO2: 98% (31 Oct 2024 18:15) (73% - 100%)    O2 Parameters below as of 31 Oct 2024 16:00  Patient On (Oxygen Delivery Method): ventilator    Physical Exam:  Gen: Sedated with precedex  CNS: Left upper and lower extremity weakness          There is normal bulk and tone.          Withdraws right side more rigorously than left side to stimuli.-	  Neck: no JVD  RES : clear , no wheezing              CVS: Regular  rhythm. Normal S1/S2  Abd: Soft, non-distended. Bowel sounds present.  Skin: No rash.  Ext:  no edema    ============================I/O===========================  I&O's Summary    30 Oct 2024 07:01  -  31 Oct 2024 07:00  --------------------------------------------------------  IN: 3394.4 mL / OUT: 3450 mL / NET: -55.6 mL    31 Oct 2024 07:01  -  31 Oct 2024 18:36  --------------------------------------------------------  IN: 1387 mL / OUT: 985 mL / NET: 402 mL    ============================ LABS =========================                        9.9    14.56 )-----------( 143      ( 31 Oct 2024 12:40 )             29.6     10-31    147[H]  |  110[H]  |  27.9[H]  ----------------------------<  128[H]  4.0   |  27.0  |  1.00    Ca    8.6      31 Oct 2024 01:50  Mg     2.5     10-31    TPro  5.4[L]  /  Alb  3.3  /  TBili  0.6  /  DBili  x   /  AST  81[H]  /  ALT  31  /  AlkPhos  49  10-30    LIVER FUNCTIONS - ( 30 Oct 2024 22:00 )  Alb: 3.3 g/dL / Pro: 5.4 g/dL / ALK PHOS: 49 U/L / ALT: 31 U/L / AST: 81 U/L / GGT: x           PT/INR - ( 29 Oct 2024 13:26 )   PT: 10.7 sec;   INR: 0.91 ratio         PTT - ( 29 Oct 2024 13:26 )  PTT:32.3 sec  ABG - ( 31 Oct 2024 17:04 )  pH, Arterial: 7.450 pH, Blood: x     /  pCO2: 40    /  pO2: 108   / HCO3: 28    / Base Excess: 3.8   /  SaO2: 99.9      CXR: Reviewed  Echo:Reviewed  ======================================================  PAST MEDICAL & SURGICAL HISTORY:  No pertinent past medical history      No pertinent past medical history      No significant past surgical history      No significant past surgical history        ====================ASSESSMENT ==============   In  ED patient Pt had CTA chest significant for Aortic dissection which is noted from the level of the root of the aorta extending into the ascending, aortic arch, descending thoracic aorta as well as extending into the abdominal aorta and is noted to end in the distal left common iliac artery. The dissection flap is in close proximity to the right coronary artery. The left renal artery is arising from the false lumen. Pt being transferred to [facility] going directly to the OR admitted under Dr. Grimm    Dissection of ascending aorta and aortic arch-  S/p Repair of type A aortic dissection 29-Oct-2024          [facility] Procedure with reconstruction of RCA  Post op Hypovolemia  Post op respiratory insufficiency       Plan:  Invasive hemodynamic monitoring required for the following of MAP/SBP monitoring to ensure adequate CV support and to prevent decompensation    Beta blocker as tolerated by HR and SBP when able  Continue Dobutamine at 1 mcg IV gtt  DC cardene gtt  Continue Aspirin daily  Amiodarone per ERP-400mg BID X 3days  Vitamin C per ERP for AF ppx  Respiratory Status required the following of continuous pulse oximetry for support and to prevent decompensation   Chest PT and IS use with bedside nurse  Analgesic regimen to optimize function with Tylenol and Narcotics   Continue Diuresis with Lasix 40 mg IV qd  Continue daily Ferrous Sulfate and Folic acid  Continue GI ppx with Protonix and Senna  DVT ppx with Lovenox and SCD boots  Strict glucose control and management for SWI ppx , required adjustment of Insulin while following serial Glucose levels ad maintain euglycemia   Monitor Neuro status  Wean off vent-  Head and Neck CT with IV contrast today     ==================== NEUROLOGY=====================  acetaminophen     Tablet .. 975 milliGRAM(s) Oral every 6 hours  dexMEDEtomidine Infusion 0.2 MICROgram(s)/kG/Hr (5.35 mL/Hr) IV Continuous <Continuous>  gabapentin 100 milliGRAM(s) Oral every 8 hours  oxyCODONE    IR 10 milliGRAM(s) Oral every 4 hours PRN Severe Pain (7 - 10)  oxyCODONE    IR 5 milliGRAM(s) Oral every 4 hours PRN Moderate Pain (4 - 6)  propofol Infusion 10 MICROgram(s)/kG/Min (6.42 mL/Hr) IV Continuous <Continuous>    ==================== RESPIRATORY======================  Post op respiratory insufficiency  Mechanical Ventilation:  Mode: IMV with PS  RR (machine): 10  TV (machine): 800  FiO2: 60  PEEP: 6  PS: 12  MAP: 11  PIP: 21      ====================CARDIOVASCULAR==================  Post op Hypovolemia  DOBUTamine Infusion 1 MICROgram(s)/kG/Min (3.21 mL/Hr) IV Continuous <Continuous>  furosemide   Injectable 40 milliGRAM(s) IV Push daily  niCARdipine Infusion 5 mG/Hr (25 mL/Hr) IV Continuous <Continuous>    ===================HEMATOLOGIC/ONC ===================  Monitor H&H/Plts    aspirin  chewable 81 milliGRAM(s) Oral daily    ===================== RENAL =========================  Continue monitoring urine output, I&OS, BUN/Cr     ==================== GASTROINTESTINAL===================  albumin human  5% IVPB 250 milliLiter(s) IV Intermittent once  ascorbic acid 500 milliGRAM(s) Oral two times a day  bisacodyl Suppository 10 milliGRAM(s) Rectal once  pantoprazole  Injectable 40 milliGRAM(s) IV Push daily  polyethylene glycol 3350 17 Gram(s) Oral daily  potassium chloride  10 mEq/50 mL IVPB 10 milliEquivalent(s) IV Intermittent every 1 hour  potassium chloride  10 mEq/50 mL IVPB 10 milliEquivalent(s) IV Intermittent every 1 hour  potassium chloride  10 mEq/50 mL IVPB 10 milliEquivalent(s) IV Intermittent every 1 hour  senna 2 Tablet(s) Oral at bedtime  sodium chloride 0.9%. 1000 milliLiter(s) (10 mL/Hr) IV Continuous <Continuous>  sodium chloride 0.9%. 1000 milliLiter(s) (5 mL/Hr) IV Continuous <Continuous>    =======================    ENDOCRINE  =====================  dextrose 50% Injectable 50 milliLiter(s) IV Push every 15 minutes  dextrose 50% Injectable 25 milliLiter(s) IV Push every 15 minutes  insulin regular Infusion 2 Unit(s)/Hr (2 mL/Hr) IV Continuous <Continuous>    ========================INFECTIOUS DISEASE================  cefuroxime  IVPB 1500 milliGRAM(s) IV Intermittent every 8 hours  vancomycin  IVPB 1500 milliGRAM(s) IV Intermittent every 12 hours      -Close hemodynamic , ventilatory and drain monitoring and management per post op routine .  -Monitor Neurologic status ,   -Head of the bed should remain elevated to 45 degrees,  -Monitor adequacy of oxygenation and ventilation and attempt to wean oxygen ,  -Monitor for arrhythmias and monitor parameters for organ perfusion,  -Glycemic control is satisfactory,  -Nutritional goals will be met using po eventually , insure adequate caloric intake and monitor the same ,  -Electrolytes have been repleted as necessary , pain control has been achieved  and wound care has been carried out ,  -Stress ulcer and VTE prophylaxis will be achieved,  -The family was updated about the course and plan .      I have spent 60 minutes providing acute care for this critically ill patient     Patient requires continuous monitoring with bedside rhythm monitoring, pulse ox monitoring, and intermittent blood gas analysis. Care plan discussed with ICU care team. Patient remained critical and at risk for life threatening decompensation.

## 2024-10-31 NOTE — CONSULT NOTE ADULT - ASSESSMENT
The patient is a 39y Male with aortic dissection who remains poorly responsive on mechanical ventilator.    Altered mental status   Possible toxic metabolic encephalopathy.  Concern for possible cerebral anoxia as well.  Initial CT negative.  Given relatively decreased movement on left, must consider right hemisphere stroke.  Eventual repeat imaging if no improvement.    Seizure.  Had some reported twitching of extremities.   Will place c-EEG today.    Case discussed with CICU team (Dr Aquino attending).

## 2024-10-31 NOTE — CHART NOTE - NSCHARTNOTEFT_GEN_A_CORE
EEG preliminary read (not final) on the initial recording hour(s) ~1 hr    No seizures were recorded during this portion of the study.  Moderate diffuse slowing noted, nonspecific.  Final report to follow tomorrow morning after completion of study.    THIS IS A PRELIMINARY IMPRESSION PENDING ATTENDING ATTESTATION.    -------------------------------------------------------------------------------------------------------  EEG reading room: [phone]    After-hours epilepsy service: [phone]      Robbie Loera DO  Epilepsy Fellow EEG preliminary read (not final) on the initial recording hour(s): >1 hr    No seizures.  Continuous right hemispheric focal cerebral dysfunction, likely structural in etiology. Correlate clinically/radiographically.    Final report to follow tomorrow morning after completion of study.    -------------------------------------------------------------------------------------------------------  EEG reading room: [phone]  After-hours epilepsy service: [phone]    Robbie Loera DO  Epilepsy Fellow    Carole Lugo MD  Attending Physician, [facility] [place]

## 2024-10-31 NOTE — DIETITIAN INITIAL EVALUATION ADULT - PERTINENT LABORATORY DATA
10-31    147[H]  |  110[H]  |  27.9[H]  ----------------------------<  128[H]  4.0   |  27.0  |  1.00    Ca    8.6      31 Oct 2024 01:50  Mg     2.5     10-31    TPro  5.4[L]  /  Alb  3.3  /  TBili  0.6  /  DBili  x   /  AST  81[H]  /  ALT  31  /  AlkPhos  49  10-30  POCT Blood Glucose.: 124 mg/dL (10-31-24 @ 09:53)

## 2024-10-31 NOTE — PROGRESS NOTE ADULT - SUBJECTIVE AND OBJECTIVE BOX
RADHA PALOMARES  [id number]    HPI:  40 y/o male with no pertinent past medical history presents to  with c/o chest tightness and pain, as per  chart patient described pain as a "squeezing" sensation across chest radiating down to leg. Pt reports that the pain woke him up at around 2am last night10/29 . He reportedly went to Urgent Care thought pain was muscular so was prescribed muscle relaxer. In  ED patient Pt had CTA chest significant for Aortic dissection which is noted from the level of the root of the aorta extending into the ascending, aortic arch, descending thoracic aorta as well as extending into the abdominal aorta and is noted to end in the distal left common iliac artery. The dissection flap is in close proximity to the right coronary artery. The left renal artery is arising from the false lumen. Pt being transferred to [facility] going directly to the OR admitted under Dr. Grimm  (29 Oct 2024 14:47)      Surgery/Hospital Course:  Repair of type A aortic dissection 29-Oct-2024   [facility] Procedure with reconstruction of RCA -  Today:  No acute events -    -CT Angio Neck w/ IV Cont (10.31.24 )  CT brain:  No hydrocephalus, acute intracranial hemorrhage, mass effect, or brain   edema.  CTA brain:  No flow-limiting stenosis or vascular aneurysm. No AVM.  Venous system is well opacified, no evidence for venoussinus or cortical   vein thrombosis.  CTA neck:  Status post repair of the proximal aspect of the previously seen left   aortic dissection. New edematous changes within the mediastinum. New   bibasilar atelectasis.  Residual false lumen supplies the left common carotid and subclavian   arteries.  No flow-limiting stenosis or vascular aneurysm.    ICU Vital Signs Last 24 Hrs  T(C): 37.9 (31 Oct 2024 08:45), Max: 38.4 (30 Oct 2024 20:00)  T(F): 100.2 (31 Oct 2024 08:45), Max: 101.1 (30 Oct 2024 20:00)  HR: 100 (31 Oct 2024 12:23) (71 - 116)  BP: 134/66 (30 Oct 2024 20:00) (122/74 - 163/91)  BP(mean): 82 (30 Oct 2024 20:00) (80 - 111)  ABP: 137/64 (31 Oct 2024 08:45) (112/58 - 186/77)  ABP(mean): 85 (31 Oct 2024 08:45) (71 - 111)  RR: 18 (31 Oct 2024 08:45) (10 - 33)  SpO2: 73% (31 Oct 2024 12:23) (73% - 100%)    O2 Parameters below as of 31 Oct 2024 08:00  Patient On (Oxygen Delivery Method): ventilator    O2 Concentration (%): 60        Physical Exam:  Gen: Sedated with precedex  CNS: Left upper and lower extremity weakness	  Neck: no JVD  RES : clear , no wheezing              CVS: Regular  rhythm. Normal S1/S2  Abd: Soft, non-distended. Bowel sounds present.  Skin: No rash.  Ext:  no edema    ============================I/O===========================   I&O's Detail    30 Oct 2024 07:01  -  31 Oct 2024 07:00  --------------------------------------------------------  IN:    Dexmedetomidine: 337.3 mL    DOBUTamine: 153.6 mL    Insulin: 76 mL    IV PiggyBack: 100 mL    IV PiggyBack: 150 mL    IV PiggyBack: 350 mL    IV PiggyBack: 300 mL    IV PiggyBack: 130 mL    IV PiggyBack: 1000 mL    NiCARdipine: 437.5 mL    sodium chloride 0.9%: 240 mL    sodium chloride 0.9%: 120 mL  Total IN: 3394.4 mL    OUT:    Chest Tube (mL): 310 mL    Chest Tube (mL): 140 mL    Indwelling Catheter - Urethral (mL): 2850 mL    Nasogastric/Oral tube (mL): 150 mL  Total OUT: 3450 mL    Total NET: -55.6 mL      31 Oct 2024 07:01  -  31 Oct 2024 12:54  --------------------------------------------------------  IN:    Dexmedetomidine: 53.6 mL    DOBUTamine: 6.4 mL    Insulin: 6 mL    IV PiggyBack: 10 mL    sodium chloride 0.9%: 20 mL    sodium chloride 0.9%: 10 mL  Total IN: 106 mL    OUT:    Chest Tube (mL): 0 mL    Chest Tube (mL): 20 mL    Indwelling Catheter - Urethral (mL): 185 mL    NiCARdipine: 0 mL    Propofol: 0 mL  Total OUT: 205 mL    Total NET: -99 mL        ============================ LABS =========================                        9.9    14.56 )-----------( 143      ( 31 Oct 2024 12:40 )             29.6     10-31    147[H]  |  110[H]  |  27.9[H]  ----------------------------<  128[H]  4.0   |  27.0  |  1.00    Ca    8.6      31 Oct 2024 01:50  Mg     2.5     10-31    TPro  5.4[L]  /  Alb  3.3  /  TBili  0.6  /  DBili  x   /  AST  81[H]  /  ALT  31  /  AlkPhos  49  10-30    LIVER FUNCTIONS - ( 30 Oct 2024 22:00 )  Alb: 3.3 g/dL / Pro: 5.4 g/dL / ALK PHOS: 49 U/L / ALT: 31 U/L / AST: 81 U/L / GGT: x           PT/INR - ( 29 Oct 2024 13:26 )   PT: 10.7 sec;   INR: 0.91 ratio         PTT - ( 29 Oct 2024 13:26 )  PTT:32.3 sec  ABG - ( 31 Oct 2024 12:34 )  pH, Arterial: 7.420 pH, Blood: x     /  pCO2: 43    /  pO2: 110   / HCO3: 28    / Base Excess: 3.4   /  SaO2: 99.4              Urinalysis Basic - ( 31 Oct 2024 01:50 )    Color: x / Appearance: x / SG: x / pH: x  Gluc: 128 mg/dL / Ketone: x  / Bili: x / Urobili: x   Blood: x / Protein: x / Nitrite: x   Leuk Esterase: x / RBC: x / WBC x   Sq Epi: x / Non Sq Epi: x / Bacteria: x      ======================Micro/Rad/Cardio=================  Culture: Reviewed   CXR: Reviewed  Echo:Reviewed  ======================================================  PAST MEDICAL & SURGICAL HISTORY:  No pertinent past medical history      No pertinent past medical history      No significant past surgical history      No significant past surgical history        ====================ASSESSMENT ==============   In  ED patient Pt had CTA chest significant for Aortic dissection which is noted from the level of the root of the aorta extending into the ascending, aortic arch, descending thoracic aorta as well as extending into the abdominal aorta and is noted to end in the distal left common iliac artery. The dissection flap is in close proximity to the right coronary artery. The left renal artery is arising from the false lumen. Pt being transferred to [facility] going directly to the OR admitted under Dr. Grimm    Dissection of ascending aorta and aortic arch-  S/p Repair of type A aortic dissection 29-Oct-2024          [facility] Procedure with reconstruction of RCA  Post op Hypovolemia  Post op respiratory insufficiency       Plan:  Invasive hemodynamic monitoring required for the following of MAP/SBP monitoring to ensure adequate CV support and to prevent decompensation    Beta blocker as tolerated by HR and SBP when able  Continue Dobutamine at 1 mcg IV gtt  DC cardene gtt  Continue Aspirin daily  Amiodarone per ERP-400mg BID X 3days  Vitamin C per ERP for AF ppx  Respiratoty Status required the following of continuous pulse oximetry for support and to prevent decompensation   Chest PT and IS use with bedside nurse  Analgesic regimen to optimize function with Tylenol and Narcotics   Continue Diuresis with Lasix 40 mg IV qd  Continue daily Ferrous Sulfate and Folic acid  Continue GI ppx with Protonix and Senna  DVT ppx with Lovenox and SCD boots  Strict glucose control and management for SWI ppx , required adjustment of Insulin while following serial Glucose levels ad maintain euglycemia   Monitor Neuro status  Wean off vent-  Head and Neck CT with IV contrast today     ==================== NEUROLOGY=====================  acetaminophen     Tablet .. 975 milliGRAM(s) Oral every 6 hours  dexMEDEtomidine Infusion 0.2 MICROgram(s)/kG/Hr (5.35 mL/Hr) IV Continuous <Continuous>  gabapentin 100 milliGRAM(s) Oral every 8 hours  oxyCODONE    IR 10 milliGRAM(s) Oral every 4 hours PRN Severe Pain (7 - 10)  oxyCODONE    IR 5 milliGRAM(s) Oral every 4 hours PRN Moderate Pain (4 - 6)  propofol Infusion 10 MICROgram(s)/kG/Min (6.42 mL/Hr) IV Continuous <Continuous>    ==================== RESPIRATORY======================  Post op respiratory insufficiency  Mechanical Ventilation:  Mode: IMV with PS  RR (machine): 10  TV (machine): 800  FiO2: 60  PEEP: 6  PS: 12  MAP: 11  PIP: 21      ====================CARDIOVASCULAR==================  Post op Hypovolemia  DOBUTamine Infusion 1 MICROgram(s)/kG/Min (3.21 mL/Hr) IV Continuous <Continuous>  furosemide   Injectable 40 milliGRAM(s) IV Push daily  niCARdipine Infusion 5 mG/Hr (25 mL/Hr) IV Continuous <Continuous>    ===================HEMATOLOGIC/ONC ===================  Monitor H&H/Plts    aspirin  chewable 81 milliGRAM(s) Oral daily    ===================== RENAL =========================  Continue monitoring urine output, I&OS, BUN/Cr     ==================== GASTROINTESTINAL===================  albumin human  5% IVPB 250 milliLiter(s) IV Intermittent once  ascorbic acid 500 milliGRAM(s) Oral two times a day  bisacodyl Suppository 10 milliGRAM(s) Rectal once  pantoprazole  Injectable 40 milliGRAM(s) IV Push daily  polyethylene glycol 3350 17 Gram(s) Oral daily  potassium chloride  10 mEq/50 mL IVPB 10 milliEquivalent(s) IV Intermittent every 1 hour  potassium chloride  10 mEq/50 mL IVPB 10 milliEquivalent(s) IV Intermittent every 1 hour  potassium chloride  10 mEq/50 mL IVPB 10 milliEquivalent(s) IV Intermittent every 1 hour  senna 2 Tablet(s) Oral at bedtime  sodium chloride 0.9%. 1000 milliLiter(s) (10 mL/Hr) IV Continuous <Continuous>  sodium chloride 0.9%. 1000 milliLiter(s) (5 mL/Hr) IV Continuous <Continuous>    =======================    ENDOCRINE  =====================  dextrose 50% Injectable 50 milliLiter(s) IV Push every 15 minutes  dextrose 50% Injectable 25 milliLiter(s) IV Push every 15 minutes  insulin regular Infusion 2 Unit(s)/Hr (2 mL/Hr) IV Continuous <Continuous>    ========================INFECTIOUS DISEASE================  cefuroxime  IVPB 1500 milliGRAM(s) IV Intermittent every 8 hours  vancomycin  IVPB 1500 milliGRAM(s) IV Intermittent every 12 hours      -Close hemodynamic , ventilatory and drain monitoring and management per post op routine .  -Monitor Neurologic status ,   -Head of the bed should remain elevated to 45 degrees,  -Monitor adequacy of oxygenation and ventilation and attempt to wean oxygen ,  -Monitor for arrhythmias and monitor parameters for organ perfusion,  -Glycemic control is satisfactory,  -Nutritional goals will be met using po eventually , insure adequate caloric intake and monitor the same ,  -Electrolytes have been repleted as necessary , pain control has been achieved  and wound care has been carried out ,  -Stress ulcer and VTE prophylaxis will be achieved,  -Agressive PT and early mobility and ambulation goals will be met,  -The family was updated about the course and plan .      I have spent 105 minutes providing acute care for this critically ill patient     Patient requires continuous monitoring with bedside rhythm monitoring, pulse ox monitoring, and intermittent blood gas analysis. Care plan discussed with ICU care team. Patient remained critical and at risk for life threatening decompensation.            RADHA PALOMARES  MRN-[id number]    HPI:  40 y/o male with no pertinent past medical history presents to  with c/o chest tightness and pain, as per  chart patient described pain as a "squeezing" sensation across chest radiating down to leg. Pt reports that the pain woke him up at around 2am last night10/29 . He reportedly went to Urgent Care thought pain was muscular so was prescribed muscle relaxer. In  ED patient Pt had CTA chest significant for Aortic dissection which is noted from the level of the root of the aorta extending into the ascending, aortic arch, descending thoracic aorta as well as extending into the abdominal aorta and is noted to end in the distal left common iliac artery. The dissection flap is in close proximity to the right coronary artery. The left renal artery is arising from the false lumen. Pt being transferred to [facility] going directly to the OR admitted under Dr. Grimm  (29 Oct 2024 14:47)      Surgery/Hospital Course:  Repair of type A aortic dissection 29-Oct-2024   [facility] Procedure with reconstruction of RCA -  Today:  No acute events -    -CT Angio Neck w/ IV Cont (10.31.24 )  CT brain:  No hydrocephalus, acute intracranial hemorrhage, mass effect, or brain   edema.  CTA brain:  No flow-limiting stenosis or vascular aneurysm. No AVM.  Venous system is well opacified, no evidence for venoussinus or cortical   vein thrombosis.  CTA neck:  Status post repair of the proximal aspect of the previously seen left   aortic dissection. New edematous changes within the mediastinum. New   bibasilar atelectasis.  Residual false lumen supplies the left common carotid and subclavian   arteries.  No flow-limiting stenosis or vascular aneurysm.    ICU Vital Signs Last 24 Hrs  T(C): 37.9 (31 Oct 2024 08:45), Max: 38.4 (30 Oct 2024 20:00)  T(F): 100.2 (31 Oct 2024 08:45), Max: 101.1 (30 Oct 2024 20:00)  HR: 100 (31 Oct 2024 12:23) (71 - 116)  BP: 134/66 (30 Oct 2024 20:00) (122/74 - 163/91)  BP(mean): 82 (30 Oct 2024 20:00) (80 - 111)  ABP: 137/64 (31 Oct 2024 08:45) (112/58 - 186/77)  ABP(mean): 85 (31 Oct 2024 08:45) (71 - 111)  RR: 18 (31 Oct 2024 08:45) (10 - 33)  SpO2: 73% (31 Oct 2024 12:23) (73% - 100%)    O2 Parameters below as of 31 Oct 2024 08:00  Patient On (Oxygen Delivery Method): ventilator    O2 Concentration (%): 60        Physical Exam:  Gen: Sedated with precedex  CNS: Left upper and lower extremity weakness          There is normal bulk and tone.          Withdraws right side more rigorously than left side to stimuli.-	  Neck: no JVD  RES : clear , no wheezing              CVS: Regular  rhythm. Normal S1/S2  Abd: Soft, non-distended. Bowel sounds present.  Skin: No rash.  Ext:  no edema    ============================I/O===========================   I&O's Detail    30 Oct 2024 07:01  -  31 Oct 2024 07:00  --------------------------------------------------------  IN:    Dexmedetomidine: 337.3 mL    DOBUTamine: 153.6 mL    Insulin: 76 mL    IV PiggyBack: 100 mL    IV PiggyBack: 150 mL    IV PiggyBack: 350 mL    IV PiggyBack: 300 mL    IV PiggyBack: 130 mL    IV PiggyBack: 1000 mL    NiCARdipine: 437.5 mL    sodium chloride 0.9%: 240 mL    sodium chloride 0.9%: 120 mL  Total IN: 3394.4 mL    OUT:    Chest Tube (mL): 310 mL    Chest Tube (mL): 140 mL    Indwelling Catheter - Urethral (mL): 2850 mL    Nasogastric/Oral tube (mL): 150 mL  Total OUT: 3450 mL    Total NET: -55.6 mL      31 Oct 2024 07:01  -  31 Oct 2024 12:54  --------------------------------------------------------  IN:    Dexmedetomidine: 53.6 mL    DOBUTamine: 6.4 mL    Insulin: 6 mL    IV PiggyBack: 10 mL    sodium chloride 0.9%: 20 mL    sodium chloride 0.9%: 10 mL  Total IN: 106 mL    OUT:    Chest Tube (mL): 0 mL    Chest Tube (mL): 20 mL    Indwelling Catheter - Urethral (mL): 185 mL    NiCARdipine: 0 mL    Propofol: 0 mL  Total OUT: 205 mL    Total NET: -99 mL        ============================ LABS =========================                        9.9    14.56 )-----------( 143      ( 31 Oct 2024 12:40 )             29.6     10-31    147[H]  |  110[H]  |  27.9[H]  ----------------------------<  128[H]  4.0   |  27.0  |  1.00    Ca    8.6      31 Oct 2024 01:50  Mg     2.5     10-31    TPro  5.4[L]  /  Alb  3.3  /  TBili  0.6  /  DBili  x   /  AST  81[H]  /  ALT  31  /  AlkPhos  49  10-30    LIVER FUNCTIONS - ( 30 Oct 2024 22:00 )  Alb: 3.3 g/dL / Pro: 5.4 g/dL / ALK PHOS: 49 U/L / ALT: 31 U/L / AST: 81 U/L / GGT: x           PT/INR - ( 29 Oct 2024 13:26 )   PT: 10.7 sec;   INR: 0.91 ratio         PTT - ( 29 Oct 2024 13:26 )  PTT:32.3 sec  ABG - ( 31 Oct 2024 12:34 )  pH, Arterial: 7.420 pH, Blood: x     /  pCO2: 43    /  pO2: 110   / HCO3: 28    / Base Excess: 3.4   /  SaO2: 99.4              Urinalysis Basic - ( 31 Oct 2024 01:50 )    Color: x / Appearance: x / SG: x / pH: x  Gluc: 128 mg/dL / Ketone: x  / Bili: x / Urobili: x   Blood: x / Protein: x / Nitrite: x   Leuk Esterase: x / RBC: x / WBC x   Sq Epi: x / Non Sq Epi: x / Bacteria: x      ======================Micro/Rad/Cardio=================  Culture: Reviewed   CXR: Reviewed  Echo:Reviewed  ======================================================  PAST MEDICAL & SURGICAL HISTORY:  No pertinent past medical history      No pertinent past medical history      No significant past surgical history      No significant past surgical history        ====================ASSESSMENT ==============   In  ED patient Pt had CTA chest significant for Aortic dissection which is noted from the level of the root of the aorta extending into the ascending, aortic arch, descending thoracic aorta as well as extending into the abdominal aorta and is noted to end in the distal left common iliac artery. The dissection flap is in close proximity to the right coronary artery. The left renal artery is arising from the false lumen. Pt being transferred to [facility] going directly to the OR admitted under Dr. Grimm    Dissection of ascending aorta and aortic arch-  S/p Repair of type A aortic dissection 29-Oct-2024          [facility] Procedure with reconstruction of RCA  Post op Hypovolemia  Post op respiratory insufficiency       Plan:  Invasive hemodynamic monitoring required for the following of MAP/SBP monitoring to ensure adequate CV support and to prevent decompensation    Beta blocker as tolerated by HR and SBP when able  Continue Dobutamine at 1 mcg IV gtt  DC cardene gtt  Continue Aspirin daily  Amiodarone per ERP-400mg BID X 3days  Vitamin C per ERP for AF ppx  Respiratoty Status required the following of continuous pulse oximetry for support and to prevent decompensation   Chest PT and IS use with bedside nurse  Analgesic regimen to optimize function with Tylenol and Narcotics   Continue Diuresis with Lasix 40 mg IV qd  Continue daily Ferrous Sulfate and Folic acid  Continue GI ppx with Protonix and Senna  DVT ppx with Lovenox and SCD boots  Strict glucose control and management for SWI ppx , required adjustment of Insulin while following serial Glucose levels ad maintain euglycemia   Monitor Neuro status  Wean off vent-  Head and Neck CT with IV contrast today     ==================== NEUROLOGY=====================  acetaminophen     Tablet .. 975 milliGRAM(s) Oral every 6 hours  dexMEDEtomidine Infusion 0.2 MICROgram(s)/kG/Hr (5.35 mL/Hr) IV Continuous <Continuous>  gabapentin 100 milliGRAM(s) Oral every 8 hours  oxyCODONE    IR 10 milliGRAM(s) Oral every 4 hours PRN Severe Pain (7 - 10)  oxyCODONE    IR 5 milliGRAM(s) Oral every 4 hours PRN Moderate Pain (4 - 6)  propofol Infusion 10 MICROgram(s)/kG/Min (6.42 mL/Hr) IV Continuous <Continuous>    ==================== RESPIRATORY======================  Post op respiratory insufficiency  Mechanical Ventilation:  Mode: IMV with PS  RR (machine): 10  TV (machine): 800  FiO2: 60  PEEP: 6  PS: 12  MAP: 11  PIP: 21      ====================CARDIOVASCULAR==================  Post op Hypovolemia  DOBUTamine Infusion 1 MICROgram(s)/kG/Min (3.21 mL/Hr) IV Continuous <Continuous>  furosemide   Injectable 40 milliGRAM(s) IV Push daily  niCARdipine Infusion 5 mG/Hr (25 mL/Hr) IV Continuous <Continuous>    ===================HEMATOLOGIC/ONC ===================  Monitor H&H/Plts    aspirin  chewable 81 milliGRAM(s) Oral daily    ===================== RENAL =========================  Continue monitoring urine output, I&OS, BUN/Cr     ==================== GASTROINTESTINAL===================  albumin human  5% IVPB 250 milliLiter(s) IV Intermittent once  ascorbic acid 500 milliGRAM(s) Oral two times a day  bisacodyl Suppository 10 milliGRAM(s) Rectal once  pantoprazole  Injectable 40 milliGRAM(s) IV Push daily  polyethylene glycol 3350 17 Gram(s) Oral daily  potassium chloride  10 mEq/50 mL IVPB 10 milliEquivalent(s) IV Intermittent every 1 hour  potassium chloride  10 mEq/50 mL IVPB 10 milliEquivalent(s) IV Intermittent every 1 hour  potassium chloride  10 mEq/50 mL IVPB 10 milliEquivalent(s) IV Intermittent every 1 hour  senna 2 Tablet(s) Oral at bedtime  sodium chloride 0.9%. 1000 milliLiter(s) (10 mL/Hr) IV Continuous <Continuous>  sodium chloride 0.9%. 1000 milliLiter(s) (5 mL/Hr) IV Continuous <Continuous>    =======================    ENDOCRINE  =====================  dextrose 50% Injectable 50 milliLiter(s) IV Push every 15 minutes  dextrose 50% Injectable 25 milliLiter(s) IV Push every 15 minutes  insulin regular Infusion 2 Unit(s)/Hr (2 mL/Hr) IV Continuous <Continuous>    ========================INFECTIOUS DISEASE================  cefuroxime  IVPB 1500 milliGRAM(s) IV Intermittent every 8 hours  vancomycin  IVPB 1500 milliGRAM(s) IV Intermittent every 12 hours      -Close hemodynamic , ventilatory and drain monitoring and management per post op routine .  -Monitor Neurologic status ,   -Head of the bed should remain elevated to 45 degrees,  -Monitor adequacy of oxygenation and ventilation and attempt to wean oxygen ,  -Monitor for arrhythmias and monitor parameters for organ perfusion,  -Glycemic control is satisfactory,  -Nutritional goals will be met using po eventually , insure adequate caloric intake and monitor the same ,  -Electrolytes have been repleted as necessary , pain control has been achieved  and wound care has been carried out ,  -Stress ulcer and VTE prophylaxis will be achieved,  -Agressive PT and early mobility and ambulation goals will be met,  -The family was updated about the course and plan .      I have spent 105 minutes providing acute care for this critically ill patient     Patient requires continuous monitoring with bedside rhythm monitoring, pulse ox monitoring, and intermittent blood gas analysis. Care plan discussed with ICU care team. Patient remained critical and at risk for life threatening decompensation.

## 2024-10-31 NOTE — DIETITIAN INITIAL EVALUATION ADULT - ENTERAL
(If unable to extubate) Pivot 1.5 @ 65ml/hr (x18 hours) 1170ml/day; 1755kcal, 109gm protein, 889ml free water

## 2024-11-01 PROBLEM — Z00.00 ENCOUNTER FOR PREVENTIVE HEALTH EXAMINATION: Status: ACTIVE | Noted: 2024-11-01

## 2024-11-01 LAB
A1C WITH ESTIMATED AVERAGE GLUCOSE RESULT: 5.3 % — SIGNIFICANT CHANGE UP (ref 4–5.6)
ANION GAP SERPL CALC-SCNC: 10 MMOL/L — SIGNIFICANT CHANGE UP (ref 5–17)
BASOPHILS # BLD AUTO: 0.01 K/UL — SIGNIFICANT CHANGE UP (ref 0–0.2)
BASOPHILS NFR BLD AUTO: 0.1 % — SIGNIFICANT CHANGE UP (ref 0–2)
BUN SERPL-MCNC: 35.3 MG/DL — HIGH (ref 8–20)
CALCIUM SERPL-MCNC: 7.8 MG/DL — LOW (ref 8.4–10.5)
CHLORIDE SERPL-SCNC: 113 MMOL/L — HIGH (ref 96–108)
CO2 SERPL-SCNC: 26 MMOL/L — SIGNIFICANT CHANGE UP (ref 22–29)
CREAT SERPL-MCNC: 1.05 MG/DL — SIGNIFICANT CHANGE UP (ref 0.5–1.3)
EGFR: 94 ML/MIN/1.73M2 — SIGNIFICANT CHANGE UP
EOSINOPHIL # BLD AUTO: 0 K/UL — SIGNIFICANT CHANGE UP (ref 0–0.5)
EOSINOPHIL NFR BLD AUTO: 0 % — SIGNIFICANT CHANGE UP (ref 0–6)
ESTIMATED AVERAGE GLUCOSE: 105 MG/DL — SIGNIFICANT CHANGE UP (ref 68–114)
GAS PNL BLDA: SIGNIFICANT CHANGE UP
GAS PNL BLDA: SIGNIFICANT CHANGE UP
GAS PNL BLDV: SIGNIFICANT CHANGE UP
GLUCOSE BLDC GLUCOMTR-MCNC: 103 MG/DL — HIGH (ref 70–99)
GLUCOSE BLDC GLUCOMTR-MCNC: 112 MG/DL — HIGH (ref 70–99)
GLUCOSE BLDC GLUCOMTR-MCNC: 114 MG/DL — HIGH (ref 70–99)
GLUCOSE BLDC GLUCOMTR-MCNC: 128 MG/DL — HIGH (ref 70–99)
GLUCOSE SERPL-MCNC: 106 MG/DL — HIGH (ref 70–99)
HCT VFR BLD CALC: 28.1 % — LOW (ref 39–50)
HGB BLD-MCNC: 9.3 G/DL — LOW (ref 13–17)
IMM GRANULOCYTES NFR BLD AUTO: 1 % — HIGH (ref 0–0.9)
LYMPHOCYTES # BLD AUTO: 1.58 K/UL — SIGNIFICANT CHANGE UP (ref 1–3.3)
LYMPHOCYTES # BLD AUTO: 10 % — LOW (ref 13–44)
MAGNESIUM SERPL-MCNC: 3.1 MG/DL — HIGH (ref 1.6–2.6)
MCHC RBC-ENTMCNC: 28.6 PG — SIGNIFICANT CHANGE UP (ref 27–34)
MCHC RBC-ENTMCNC: 33.1 G/DL — SIGNIFICANT CHANGE UP (ref 32–36)
MCV RBC AUTO: 86.5 FL — SIGNIFICANT CHANGE UP (ref 80–100)
MONOCYTES # BLD AUTO: 2.16 K/UL — HIGH (ref 0–0.9)
MONOCYTES NFR BLD AUTO: 13.7 % — SIGNIFICANT CHANGE UP (ref 2–14)
NEUTROPHILS # BLD AUTO: 11.86 K/UL — HIGH (ref 1.8–7.4)
NEUTROPHILS NFR BLD AUTO: 75.2 % — SIGNIFICANT CHANGE UP (ref 43–77)
PLATELET # BLD AUTO: 156 K/UL — SIGNIFICANT CHANGE UP (ref 150–400)
POTASSIUM SERPL-MCNC: 4.2 MMOL/L — SIGNIFICANT CHANGE UP (ref 3.5–5.3)
POTASSIUM SERPL-SCNC: 4.2 MMOL/L — SIGNIFICANT CHANGE UP (ref 3.5–5.3)
RBC # BLD: 3.25 M/UL — LOW (ref 4.2–5.8)
RBC # FLD: 14.4 % — SIGNIFICANT CHANGE UP (ref 10.3–14.5)
SODIUM SERPL-SCNC: 149 MMOL/L — HIGH (ref 135–145)
WBC # BLD: 15.76 K/UL — HIGH (ref 3.8–10.5)
WBC # FLD AUTO: 15.76 K/UL — HIGH (ref 3.8–10.5)

## 2024-11-01 PROCEDURE — 99292 CRITICAL CARE ADDL 30 MIN: CPT | Mod: 24

## 2024-11-01 PROCEDURE — 71045 X-RAY EXAM CHEST 1 VIEW: CPT | Mod: 26

## 2024-11-01 PROCEDURE — 95720 EEG PHY/QHP EA INCR W/VEEG: CPT

## 2024-11-01 PROCEDURE — 99291 CRITICAL CARE FIRST HOUR: CPT | Mod: 24

## 2024-11-01 PROCEDURE — 99232 SBSQ HOSP IP/OBS MODERATE 35: CPT

## 2024-11-01 PROCEDURE — 93010 ELECTROCARDIOGRAM REPORT: CPT

## 2024-11-01 PROCEDURE — 99292 CRITICAL CARE ADDL 30 MIN: CPT

## 2024-11-01 RX ORDER — NOREPINEPHRINE BITARTRATE 1 MG/ML
0.05 INJECTION, SOLUTION, CONCENTRATE INTRAVENOUS
Qty: 8 | Refills: 0 | Status: DISCONTINUED | OUTPATIENT
Start: 2024-11-01 | End: 2024-11-02

## 2024-11-01 RX ORDER — SODIUM CHLORIDE 9 MG/ML
10 INJECTION, SOLUTION INTRAMUSCULAR; INTRAVENOUS; SUBCUTANEOUS
Refills: 0 | Status: DISCONTINUED | OUTPATIENT
Start: 2024-11-01 | End: 2024-11-11

## 2024-11-01 RX ORDER — POTASSIUM CHLORIDE 10 MEQ
10 TABLET, EXTENDED RELEASE ORAL
Refills: 0 | Status: COMPLETED | OUTPATIENT
Start: 2024-11-01 | End: 2024-11-01

## 2024-11-01 RX ORDER — MIDAZOLAM IN 5 % DEXTROSE/PF 15 MG/30ML
2 SYRINGE (ML) INTRAVENOUS EVERY 8 HOURS
Refills: 0 | Status: DISCONTINUED | OUTPATIENT
Start: 2024-11-01 | End: 2024-11-01

## 2024-11-01 RX ORDER — LEVETIRACETAM 500 MG/1
1000 TABLET, FILM COATED ORAL EVERY 12 HOURS
Refills: 0 | Status: DISCONTINUED | OUTPATIENT
Start: 2024-11-01 | End: 2024-11-02

## 2024-11-01 RX ORDER — ACETAMINOPHEN 500 MG
1000 TABLET ORAL ONCE
Refills: 0 | Status: COMPLETED | OUTPATIENT
Start: 2024-11-01 | End: 2024-11-01

## 2024-11-01 RX ORDER — ENOXAPARIN SODIUM 40MG/0.4ML
40 SYRINGE (ML) SUBCUTANEOUS EVERY 24 HOURS
Refills: 0 | Status: DISCONTINUED | OUTPATIENT
Start: 2024-11-01 | End: 2024-11-11

## 2024-11-01 RX ORDER — CHLORHEXIDINE GLUCONATE 40 MG/ML
1 SOLUTION TOPICAL
Refills: 0 | Status: DISCONTINUED | OUTPATIENT
Start: 2024-11-01 | End: 2024-11-04

## 2024-11-01 RX ADMIN — Medication 1000 MILLIGRAM(S): at 12:22

## 2024-11-01 RX ADMIN — Medication 100 MILLIEQUIVALENT(S): at 19:26

## 2024-11-01 RX ADMIN — PROPOFOL 6.42 MICROGRAM(S)/KG/MIN: 10 INJECTION, EMULSION INTRAVENOUS at 17:42

## 2024-11-01 RX ADMIN — PROPOFOL 6.42 MICROGRAM(S)/KG/MIN: 10 INJECTION, EMULSION INTRAVENOUS at 11:50

## 2024-11-01 RX ADMIN — CHLORHEXIDINE GLUCONATE 1 APPLICATION(S): 40 SOLUTION TOPICAL at 21:16

## 2024-11-01 RX ADMIN — Medication 400 MILLIGRAM(S): at 11:50

## 2024-11-01 RX ADMIN — CHLORHEXIDINE GLUCONATE 15 MILLILITER(S): 40 SOLUTION TOPICAL at 17:42

## 2024-11-01 RX ADMIN — Medication 100 MILLIEQUIVALENT(S): at 18:12

## 2024-11-01 RX ADMIN — Medication 81 MILLIGRAM(S): at 11:51

## 2024-11-01 RX ADMIN — PROPOFOL 6.42 MICROGRAM(S)/KG/MIN: 10 INJECTION, EMULSION INTRAVENOUS at 22:00

## 2024-11-01 RX ADMIN — Medication 500 MILLIGRAM(S): at 05:03

## 2024-11-01 RX ADMIN — PANTOPRAZOLE SODIUM 40 MILLIGRAM(S): 40 TABLET, DELAYED RELEASE ORAL at 11:50

## 2024-11-01 RX ADMIN — Medication 100 MILLIEQUIVALENT(S): at 17:41

## 2024-11-01 RX ADMIN — Medication 40 MILLIGRAM(S): at 11:50

## 2024-11-01 RX ADMIN — Medication 1 MILLIGRAM(S): at 05:03

## 2024-11-01 RX ADMIN — LEVETIRACETAM 400 MILLIGRAM(S): 500 TABLET, FILM COATED ORAL at 05:03

## 2024-11-01 RX ADMIN — CHLORHEXIDINE GLUCONATE 15 MILLILITER(S): 40 SOLUTION TOPICAL at 05:03

## 2024-11-01 RX ADMIN — Medication 40 MILLIGRAM(S): at 05:03

## 2024-11-01 RX ADMIN — POLYETHYLENE GLYCOL 3350 17 GRAM(S): 17 POWDER, FOR SOLUTION ORAL at 11:50

## 2024-11-01 RX ADMIN — Medication 500 MILLIGRAM(S): at 17:42

## 2024-11-01 RX ADMIN — LEVETIRACETAM 400 MILLIGRAM(S): 500 TABLET, FILM COATED ORAL at 18:12

## 2024-11-01 NOTE — PROGRESS NOTE ADULT - SUBJECTIVE AND OBJECTIVE BOX
[facility]                                        Neurology at [place]                                 Hany Becker, & Raghavendra                                  [address]                                        [place], [place], [address]                                             [phone]        CC: Aortic dissection and altered mental status. Seizure.    HPI:   The patient is a 39y Male who presented to [facility] with severe back pain radiating to legs followed by chest pain. Imaging confirmed aortic dissection and he was transferred here for surgery on 10/29/24.  He has remained on mechanical ventilator since the surgery and has been poorly responsive when sedation held.   There was a question of twitching of the extremities and neurology evaluation requested 10/31/24.    Interim history:  Remains in [address] [place] [address].  On mechanical ventilator.    ROS:   Unobtainable due to patient's condition.     MEDICATIONS  (STANDING):  acetaminophen     Tablet .. 975 milliGRAM(s) Oral every 6 hours  acetaminophen   IVPB .. 1000 milliGRAM(s) IV Intermittent once  aMIOdarone    Tablet 400 milliGRAM(s) Oral two times a day  ascorbic acid 500 milliGRAM(s) Oral two times a day  aspirin  chewable 81 milliGRAM(s) Oral daily  bisacodyl Suppository 10 milliGRAM(s) Rectal once  chlorhexidine 0.12% Liquid 15 milliLiter(s) Oral Mucosa every 12 hours  chlorhexidine 2% Cloths 1 Application(s) Topical daily  dexMEDEtomidine Infusion 0.2 MICROgram(s)/kG/Hr (5.35 mL/Hr) IV Continuous <Continuous>  dexMEDEtomidine Infusion 1.5 MICROgram(s)/kG/Hr (40.1 mL/Hr) IV Continuous <Continuous>  dextrose 50% Injectable 50 milliLiter(s) IV Push every 15 minutes  dextrose 50% Injectable 25 milliLiter(s) IV Push every 15 minutes  DOBUTamine Infusion 1 MICROgram(s)/kG/Min (3.21 mL/Hr) IV Continuous <Continuous>  enoxaparin Injectable 40 milliGRAM(s) SubCutaneous every 24 hours  furosemide   Injectable 40 milliGRAM(s) IV Push daily  gabapentin 100 milliGRAM(s) Oral every 8 hours  influenza   Vaccine 0.5 milliLiter(s) IntraMuscular once  insulin lispro (ADMELOG) corrective regimen sliding scale   SubCutaneous every 6 hours  levETIRAcetam  IVPB 1000 milliGRAM(s) IV Intermittent every 12 hours  midazolam Injectable 2 milliGRAM(s) IV Push every 8 hours  niCARdipine Infusion 5 mG/Hr (25 mL/Hr) IV Continuous <Continuous>  norepinephrine Infusion 0.05 MICROgram(s)/kG/Min (10 mL/Hr) IV Continuous <Continuous>  pantoprazole  Injectable 40 milliGRAM(s) IV Push daily  polyethylene glycol 3350 17 Gram(s) Oral daily  potassium chloride  10 mEq/50 mL IVPB 10 milliEquivalent(s) IV Intermittent every 1 hour  potassium chloride  10 mEq/50 mL IVPB 10 milliEquivalent(s) IV Intermittent every 1 hour  potassium chloride  10 mEq/50 mL IVPB 10 milliEquivalent(s) IV Intermittent every 1 hour  propofol Infusion 10 MICROgram(s)/kG/Min (6.42 mL/Hr) IV Continuous <Continuous>  senna 2 Tablet(s) Oral at bedtime  sodium chloride 0.9%. 1000 milliLiter(s) (5 mL/Hr) IV Continuous <Continuous>  sodium chloride 0.9%. 1000 milliLiter(s) (10 mL/Hr) IV Continuous <Continuous>    Vital Signs Last 24 Hrs  T(C): 37.9 (01 Nov 2024 09:45), Max: 38.6 (31 Oct 2024 17:15)  T(F): 100.2 (01 Nov 2024 09:45), Max: 101.5 (31 Oct 2024 17:15)  HR: 75 (01 Nov 2024 09:45) (61 - 100)  RR: 17 (01 Nov 2024 09:45) (10 - 22)  SpO2: 98% (01 Nov 2024 09:45) (73% - 100%)    Parameters below as of 01 Nov 2024 08:00  Patient On (Oxygen Delivery Method): ventilator    O2 Concentration (%): 60    Detailed Neurologic Exam:    Mental status: The patient is unresponsive on mechanical ventilator.     Cranial nerves: There is no papilledema. Pupils react symmetrically to light. No blink to threat from either side. Not tracking with gaze. Eyes move slightly to oculocephalic maneuvers. Face grossly symmetric although an endotracheal tube is in place. Palate and tongue cannot be assessed.     Motor/Sensory: There is normal bulk and tone.  Withdraws right side more rigorously than left to stimuli.    Reflexes: 1+ throughout and plantar responses are flexor.    Cerebellar: Cannot be tested.     Labs:     11-01    149[H]  |  113[H]  |  35.3[H]  ----------------------------<  106[H]  4.2   |  26.0  |  1.05    Ca    7.8[L]      01 Nov 2024 02:05  Mg     3.1     11-01    TPro  6.0[L]  /  Alb  3.4  /  TBili  0.5  /  DBili  x   /  AST  39  /  ALT  28  /  AlkPhos  58  10-31                            9.3    15.76 )-----------( 156      ( 01 Nov 2024 02:05 )             28.1       EEG:   Abnormal EEG:  - Moderate generalized slowing  - Continuous focal slowing over the right hemisphere  - Focal to bilateral tonic-clonic seizure, likely onset in the right posterior quadrant.

## 2024-11-01 NOTE — PROGRESS NOTE ADULT - SUBJECTIVE AND OBJECTIVE BOX
RADHA PALOMARES  MRN-[id number]    HPI:  38 y/o male with no pertinent past medical history presents to  with c/o chest tightness and pain, as per  chart patient described pain as a "squeezing" sensation across chest radiating down to leg. Pt reports that the pain woke him up at around 2am last night10/29 . He reportedly went to Urgent Care thought pain was muscular so was prescribed muscle relaxer. In  ED patient Pt had CTA chest significant for Aortic dissection which is noted from the level of the root of the aorta extending into the ascending, aortic arch, descending thoracic aorta as well as extending into the abdominal aorta and is noted to end in the distal left common iliac artery. The dissection flap is in close proximity to the right coronary artery. The left renal artery is arising from the false lumen. Pt being transferred to [facility] going directly to the OR admitted under Dr. Grimm  (29 Oct 2024 14:47)      Surgery/Hospital Course:  Repair of type A aortic dissection 29-Oct-2024   Bentall Procedure with reconstruction of RCA -  Today:  No acute events -    Abnormal EEG:  - Moderate generalized slowing  - Continuous focal slowing over the right hemisphere  - Focal to bilateral tonic-clonic seizure, likely onset in the right posterior quadrant-    ICU Vital Signs Last 24 Hrs  T(C): 38.1 (01 Nov 2024 12:00), Max: 38.6 (31 Oct 2024 17:15)  T(F): 100.6 (01 Nov 2024 12:00), Max: 101.5 (31 Oct 2024 17:15)  HR: 83 (01 Nov 2024 12:00) (61 - 99)  BP: --  BP(mean): --  ABP: 118/57 (01 Nov 2024 12:00) (90/45 - 183/83)  ABP(mean): 76 (01 Nov 2024 12:00) (0 - 278)  RR: 13 (01 Nov 2024 12:00) (10 - 22)  SpO2: 97% (01 Nov 2024 12:00) (96% - 100%)    O2 Parameters below as of 01 Nov 2024 12:00  Patient On (Oxygen Delivery Method): ventilator    O2 Concentration (%): 60      Physical Exam:  Gen: Sedated with precedex and propofol  CNS: Left upper and lower extremity weakness          There is normal bulk and tone.          Withdraws right side more rigorously than left side to stimuli.-	  Neck: no JVD  RES : clear , no wheezing              CVS: Regular  rhythm. Normal S1/S2  Abd: Soft, non-distended. Bowel sounds present.  Skin: No rash.  Ext:  no edema-    ============================I/O===========================   I&O's Detail    31 Oct 2024 07:01  -  01 Nov 2024 07:00  --------------------------------------------------------  IN:    Dexmedetomidine: 356.7 mL    DOBUTamine: 76.8 mL    Insulin: 18 mL    IV PiggyBack: 100 mL    IV PiggyBack: 500 mL    IV PiggyBack: 150 mL    IV PiggyBack: 100 mL    IV PiggyBack: 240 mL    NiCARdipine: 75 mL    Norepinephrine: 70 mL    Propofol: 184.1 mL    sodium chloride 0.9%: 120 mL    sodium chloride 0.9%: 240 mL  Total IN: 2230.6 mL    OUT:    Chest Tube (mL): 65 mL    Chest Tube (mL): 250 mL    Indwelling Catheter - Urethral (mL): 2270 mL    Nasogastric/Oral tube (mL): 50 mL  Total OUT: 2635 mL    Total NET: -404.4 mL      01 Nov 2024 07:01  -  01 Nov 2024 12:49  --------------------------------------------------------  IN:    Dexmedetomidine: 26.2 mL    DOBUTamine: 3.2 mL    IV PiggyBack: 100 mL    Norepinephrine: 3 mL    Propofol: 48.4 mL    sodium chloride 0.9%: 50 mL    sodium chloride 0.9%: 25 mL  Total IN: 255.8 mL    OUT:    Chest Tube (mL): 15 mL    Chest Tube (mL): 40 mL    Indwelling Catheter - Urethral (mL): 610 mL    NiCARdipine: 0 mL  Total OUT: 665 mL    Total NET: -409.2 mL        ============================ LABS =========================                        9.3    15.76 )-----------( 156      ( 01 Nov 2024 02:05 )             28.1     11-01    149[H]  |  113[H]  |  35.3[H]  ----------------------------<  106[H]  4.2   |  26.0  |  1.05    Ca    7.8[L]      01 Nov 2024 02:05  Mg     3.1     11-01    TPro  6.0[L]  /  Alb  3.4  /  TBili  0.5  /  DBili  x   /  AST  39  /  ALT  28  /  AlkPhos  58  10-31    LIVER FUNCTIONS - ( 31 Oct 2024 21:31 )  Alb: 3.4 g/dL / Pro: 6.0 g/dL / ALK PHOS: 58 U/L / ALT: 28 U/L / AST: 39 U/L / GGT: x             ABG - ( 01 Nov 2024 01:31 )  pH, Arterial: 7.410 pH, Blood: x     /  pCO2: 45    /  pO2: 146   / HCO3: 28    / Base Excess: 3.9   /  SaO2: 100.0             Urinalysis Basic - ( 01 Nov 2024 02:05 )    Color: x / Appearance: x / SG: x / pH: x  Gluc: 106 mg/dL / Ketone: x  / Bili: x / Urobili: x   Blood: x / Protein: x / Nitrite: x   Leuk Esterase: x / RBC: x / WBC x   Sq Epi: x / Non Sq Epi: x / Bacteria: x      ======================Micro/Rad/Cardio=================  Culture: Reviewed   CXR: Reviewed  Echo:Reviewed  ======================================================  PAST MEDICAL & SURGICAL HISTORY:  No pertinent past medical history      No pertinent past medical history      No significant past surgical history      No significant past surgical history        ====================ASSESSMENT ==============   In  ED patient Pt had CTA chest significant for Aortic dissection which is noted from the level of the root of the aorta extending into the ascending, aortic arch, descending thoracic aorta as well as extending into the abdominal aorta and is noted to end in the distal left common iliac artery. The dissection flap is in close proximity to the right coronary artery. The left renal artery is arising from the false lumen. Pt being transferred to [facility] going directly to the OR admitted under Dr. Grimm    Dissection of ascending aorta and aortic arch-  S/p Repair of type A aortic dissection 29-Oct-2024          Bentall Procedure with reconstruction of RCA  Post op Hypovolemia  Post op respiratory insufficiency   Post op Seizures    Plan:  DC [place]  DC 1 Chest tube Med #1  Invasive hemodynamic monitoring required for the following of MAP/SBP monitoring to ensure adequate CV support and to prevent decompensation    Beta blocker as tolerated by HR and SBP when able  DC  Dobutamine at 1 mcg IV gtt  Continue Aspirin daily  Respiratoty Status required the following of continuous pulse oximetry for support and to prevent decompensation   Chest PT and IS use with bedside nurse  Analgesic regimen to optimize function with Tylenol and Narcotics   Continue Diuresis with Lasix 40 mg IV qd  Continue GI ppx with Protonix and Senna  DVT ppx with Lovenox and SCD boots  Strict glucose control and management for SWI ppx , required adjustment of Insulin while following serial Glucose levels ad maintain euglycemia   Monitor Neuro status  Head and Neck CT with IV contrast  yesterday  Neuro Consult appreciated    ====================== NEUROLOGY=====================  acetaminophen     Tablet .. 975 milliGRAM(s) Oral every 6 hours  dexMEDEtomidine Infusion 0.2 MICROgram(s)/kG/Hr (5.35 mL/Hr) IV Continuous <Continuous>  dexMEDEtomidine Infusion 1.5 MICROgram(s)/kG/Hr (40.1 mL/Hr) IV Continuous <Continuous>  gabapentin 100 milliGRAM(s) Oral every 8 hours  levETIRAcetam  IVPB 1000 milliGRAM(s) IV Intermittent every 12 hours  propofol Infusion 10 MICROgram(s)/kG/Min (6.42 mL/Hr) IV Continuous <Continuous>    ==================== RESPIRATORY======================  Post op respiratory insufficiency  Mechanical Ventilation:  Mode: AC/ CMV (Assist Control/ Continuous Mandatory Ventilation)  RR (machine): 10  TV (machine): 800  FiO2: 60  PEEP: 6  ITime: 1  MAP: 12  PIP: 27      ====================CARDIOVASCULAR==================  Post op Hypovolemia  aMIOdarone    Tablet 400 milliGRAM(s) Oral two times a day  DOBUTamine Infusion 1 MICROgram(s)/kG/Min (3.21 mL/Hr) IV Continuous <Continuous>  furosemide   Injectable 40 milliGRAM(s) IV Push daily  niCARdipine Infusion 5 mG/Hr (25 mL/Hr) IV Continuous <Continuous>  norepinephrine Infusion 0.05 MICROgram(s)/kG/Min (10 mL/Hr) IV Continuous <Continuous>    ===================HEMATOLOGIC/ONC ===================  Monitor H&H/Plts    aspirin  chewable 81 milliGRAM(s) Oral daily  enoxaparin Injectable 40 milliGRAM(s) SubCutaneous every 24 hours    ===================== RENAL =========================  Continue monitoring urine output, I&OS, BUN/Cr     ==================== GASTROINTESTINAL===================  ascorbic acid 500 milliGRAM(s) Oral two times a day  bisacodyl Suppository 10 milliGRAM(s) Rectal once  pantoprazole  Injectable 40 milliGRAM(s) IV Push daily  polyethylene glycol 3350 17 Gram(s) Oral daily  potassium chloride  10 mEq/50 mL IVPB 10 milliEquivalent(s) IV Intermittent every 1 hour  potassium chloride  10 mEq/50 mL IVPB 10 milliEquivalent(s) IV Intermittent every 1 hour  potassium chloride  10 mEq/50 mL IVPB 10 milliEquivalent(s) IV Intermittent every 1 hour  senna 2 Tablet(s) Oral at bedtime  sodium chloride 0.9%. 1000 milliLiter(s) (10 mL/Hr) IV Continuous <Continuous>  sodium chloride 0.9%. 1000 milliLiter(s) (5 mL/Hr) IV Continuous <Continuous>    =======================    ENDOCRINE  =====================  dextrose 50% Injectable 50 milliLiter(s) IV Push every 15 minutes  dextrose 50% Injectable 25 milliLiter(s) IV Push every 15 minutes  insulin lispro (ADMELOG) corrective regimen sliding scale   SubCutaneous every 6 hours    ========================INFECTIOUS DISEASE================      -Close hemodynamic , ventilatory and drain monitoring and management per post op routine .  -Monitor Neurologic status ,   -Head of the bed should remain elevated to 45 degrees,  -Monitor adequacy of oxygenation and ventilation and attempt to wean oxygen ,  -Monitor for arrhythmias and monitor parameters for organ perfusion,  -Glycemic control is satisfactory,  -Nutritional goals will be met using po eventually , insure adequate caloric intake and monitor the same ,  -Electrolytes have been repleted as necessary , pain control has been achieved  and wound care has been carried out ,  -Stress ulcer and VTE prophylaxis will be achieved,  -Agressive PT and early mobility and ambulation goals will be met,  -The family was updated about the course and plan .      I have spent 105 minutes providing acute care for this critically ill patient     Patient requires continuous monitoring with bedside rhythm monitoring, pulse ox monitoring, and intermittent blood gas analysis. Care plan discussed with ICU care team. Patient remained critical and at risk for life threatening decompensation.

## 2024-11-01 NOTE — EEG REPORT - NS EEG TEXT BOX
[facility]   COMPREHENSIVE EPILEPSY CENTER   REPORT OF CONTINUOUS VIDEO EEG     [facility]: [address], [place] [address], Ph#: [phone]  [facility]: [id number], [place] [address], Ph#: [phone]  Office: [address] [place] [address], [place] [address], [place] [address] Ph#: [phone]    Patient Name: RADHA PALOMARES  Age and : 39y ([birth year])  MRN #: [id number]  Location: [address] [address]    Start Date:   2:42pm  10/31/24  End Date:     8am 23  Duration: 16h 56m    _____________________________________________________________  TECHNICAL INFORMATION    EEG Recording Technique:  The patient underwent continuous Video-EEG monitoring, using Telemetry System hardware on the XLTek Digital System. EEG and video data were stored on a computer hard drive with important events saved in digital archive files. The material was reviewed by a physician (electroencephalographer / epileptologist) on a daily basis. [place] and seizure detection algorithms were utilized and reviewed. An EEG Technician attended to the patient, and was available throughout daytime work hours.  The epilepsy center neurologist was available in person or on call 24-hours per day.    EEG Placement and Labeling of Electrodes:  The EEG was performed utilizing 20 channel referential EEG connections (coronal over temporal over parasagittal montage) using all standard 10-20 electrode placements with EKG, with additional electrodes placed in the inferior temporal region using the modified 10-10 montage electrode placements for elective admissions, or if deemed necessary. Recording was at a sampling rate of 256 samples per second per channel. Time synchronized digital video recording was done simultaneously with EEG recording. A low light infrared camera was used for low light recording.     _____________________________________________________________  HISTORY:  Patient is a 39y old  Male who presents with a chief complaint of Type A dissection (31 Oct 2024 18:33)      MEDICATIONS  (STANDING):  acetaminophen     Tablet .. 975 milliGRAM(s) Oral every 6 hours  aMIOdarone    Tablet 400 milliGRAM(s) Oral two times a day  ascorbic acid 500 milliGRAM(s) Oral two times a day  aspirin  chewable 81 milliGRAM(s) Oral daily  bisacodyl Suppository 10 milliGRAM(s) Rectal once  chlorhexidine 0.12% Liquid 15 milliLiter(s) Oral Mucosa every 12 hours  chlorhexidine 2% Cloths 1 Application(s) Topical daily  dexMEDEtomidine Infusion 0.2 MICROgram(s)/kG/Hr (5.35 mL/Hr) IV Continuous <Continuous>  dexMEDEtomidine Infusion 1.5 MICROgram(s)/kG/Hr (40.1 mL/Hr) IV Continuous <Continuous>  dextrose 50% Injectable 50 milliLiter(s) IV Push every 15 minutes  dextrose 50% Injectable 25 milliLiter(s) IV Push every 15 minutes  DOBUTamine Infusion 1 MICROgram(s)/kG/Min (3.21 mL/Hr) IV Continuous <Continuous>  enoxaparin Injectable 40 milliGRAM(s) SubCutaneous every 24 hours  furosemide   Injectable 40 milliGRAM(s) IV Push daily  gabapentin 100 milliGRAM(s) Oral every 8 hours  influenza   Vaccine 0.5 milliLiter(s) IntraMuscular once  insulin lispro (ADMELOG) corrective regimen sliding scale   SubCutaneous every 6 hours  levETIRAcetam  IVPB 1000 milliGRAM(s) IV Intermittent every 12 hours  midazolam Injectable 2 milliGRAM(s) IV Push every 8 hours  niCARdipine Infusion 5 mG/Hr (25 mL/Hr) IV Continuous <Continuous>  norepinephrine Infusion 0.05 MICROgram(s)/kG/Min (10 mL/Hr) IV Continuous <Continuous>  pantoprazole  Injectable 40 milliGRAM(s) IV Push daily  polyethylene glycol 3350 17 Gram(s) Oral daily  potassium chloride  10 mEq/50 mL IVPB 10 milliEquivalent(s) IV Intermittent every 1 hour  potassium chloride  10 mEq/50 mL IVPB 10 milliEquivalent(s) IV Intermittent every 1 hour  potassium chloride  10 mEq/50 mL IVPB 10 milliEquivalent(s) IV Intermittent every 1 hour  propofol Infusion 10 MICROgram(s)/kG/Min (6.42 mL/Hr) IV Continuous <Continuous>  senna 2 Tablet(s) Oral at bedtime  sodium chloride 0.9%. 1000 milliLiter(s) (5 mL/Hr) IV Continuous <Continuous>  sodium chloride 0.9%. 1000 milliLiter(s) (10 mL/Hr) IV Continuous <Continuous>    _____________________________________________________________  STUDY INTERPRETATION    Background:  The background was continuous, spontaneously variable, reactive, and predominantly consisted of delta and theta activities. No PDR discernable.     Sleep:  Stage II sleep transients were not recorded.    Focal slowing:   continuous focal slowing over the right hemisphere    Non-epileptiform Paroxysmal Abnormalities:  None    Interictal Epileptiform Abnormalities:   One focal to bilateral tonic-clonic seizure, onset likely right posterior quadrant.    d1 21:07:52		Seizure onset  d1 21:07:52		right posterior sharp waves, max. O2 (>P8)  d1 21:07:59		tonic  d1 21:08:05		left head turn  d1 21:08:06		lifting right arm (left arm being held by visitor)  d1 21:08:19		figure of 4  d1 21:08:39		clonic  d1 21:10:06		offset electrographic  d1 21:10:25		postictal diffuse slowing    Ictal Abnormalities:   No clinical events.  No electrographic seizures.    Artifacts:  Intermittent myogenic and movement artifacts were noted.    ECG:  The heart rate on single channel ECG showed 60-90 BPM, regular.    _____________________________________________________________  EEG CLASSIFICATION/SUMMARY:    Abnormal EEG:  - Moderate generalized slowing  - Continuous focal slowing over the right hemisphere  - Focal to bilateral tonic-clonic seizure, likely onset in the right posterior quadrant  _____________________________________________________________  CLINICAL IMPRESSION:     Moderate diffuse or multifocal cerebral dysfunction.  Cerebral dysfunction in the right hemisphere, likely structural in origin.  Potential epileptogenic focus with increased risk for seizure from the right posterior quadrant. The captured seizure had a likely onset in the right occipital, or parietal / posterior temporal region and generalized quickly.       Rob Ashby MD PhD  Epilepsy Attending, [facility] [place]

## 2024-11-01 NOTE — PROGRESS NOTE ADULT - SUBJECTIVE AND OBJECTIVE BOX
CRITICAL CARE ATTENDING - CTICU    MEDICATIONS  (STANDING):  acetaminophen     Tablet .. 975 milliGRAM(s) Oral every 6 hours  aMIOdarone    Tablet 400 milliGRAM(s) Oral two times a day  ascorbic acid 500 milliGRAM(s) Oral two times a day  aspirin  chewable 81 milliGRAM(s) Oral daily  bisacodyl Suppository 10 milliGRAM(s) Rectal once  chlorhexidine 0.12% Liquid 15 milliLiter(s) Oral Mucosa every 12 hours  chlorhexidine 2% Cloths 1 Application(s) Topical daily  dexMEDEtomidine Infusion 0.2 MICROgram(s)/kG/Hr (5.35 mL/Hr) IV Continuous <Continuous>  dexMEDEtomidine Infusion 1.5 MICROgram(s)/kG/Hr (40.1 mL/Hr) IV Continuous <Continuous>  dextrose 50% Injectable 50 milliLiter(s) IV Push every 15 minutes  dextrose 50% Injectable 25 milliLiter(s) IV Push every 15 minutes  DOBUTamine Infusion 1 MICROgram(s)/kG/Min (3.21 mL/Hr) IV Continuous <Continuous>  enoxaparin Injectable 40 milliGRAM(s) SubCutaneous every 24 hours  furosemide   Injectable 40 milliGRAM(s) IV Push daily  gabapentin 100 milliGRAM(s) Oral every 8 hours  influenza   Vaccine 0.5 milliLiter(s) IntraMuscular once  insulin lispro (ADMELOG) corrective regimen sliding scale   SubCutaneous every 6 hours  levETIRAcetam  IVPB 1000 milliGRAM(s) IV Intermittent every 12 hours  niCARdipine Infusion 5 mG/Hr (25 mL/Hr) IV Continuous <Continuous>  norepinephrine Infusion 0.05 MICROgram(s)/kG/Min (10 mL/Hr) IV Continuous <Continuous>  pantoprazole  Injectable 40 milliGRAM(s) IV Push daily  polyethylene glycol 3350 17 Gram(s) Oral daily  potassium chloride  10 mEq/50 mL IVPB 10 milliEquivalent(s) IV Intermittent every 1 hour  potassium chloride  10 mEq/50 mL IVPB 10 milliEquivalent(s) IV Intermittent every 1 hour  potassium chloride  10 mEq/50 mL IVPB 10 milliEquivalent(s) IV Intermittent every 1 hour  propofol Infusion 10 MICROgram(s)/kG/Min (6.42 mL/Hr) IV Continuous <Continuous>  senna 2 Tablet(s) Oral at bedtime  sodium chloride 0.9%. 1000 milliLiter(s) (5 mL/Hr) IV Continuous <Continuous>  sodium chloride 0.9%. 1000 milliLiter(s) (10 mL/Hr) IV Continuous <Continuous>                                    9.3    15.76 )-----------( 156      ( 2024 02:05 )             28.1           149[H]  |  113[H]  |  35.3[H]  ----------------------------<  106[H]  4.2   |  26.0  |  1.05    Ca    7.8[L]      2024 02:05  Mg     3.1         TPro  6.0[L]  /  Alb  3.4  /  TBili  0.5  /  DBili  x   /  AST  39  /  ALT  28  /  AlkPhos  58  10-31          Mode: AC/ CMV (Assist Control/ Continuous Mandatory Ventilation)  RR (machine): 10  TV (machine): 800  FiO2: 60  PEEP: 6  ITime: 1  MAP: 12  PIP: 27      Daily     Daily Weight in k.9 (2024 04:45)      10-31 @ : @ 07:00  --------------------------------------------------------  IN: 2230.6 mL / OUT: 2635 mL / NET: -404.4 mL     @ : @ 15:33  --------------------------------------------------------  IN: 255.8 mL / OUT: 665 mL / NET: -409.2 mL        Critically Ill patient  : [ ] preoperative ,   [x ] post operative    Requires :  [ x] Arterial Line   [x ] Central Line  [x ] PA catheter  [ ] IABP  [ ] ECMO  [ ] LVAD  [ x] Ventilator  [ x] pacemaker - TPM  [ ] Impella.                      [x ] ABG's     [ x] Pulse Oxymetry Monitoring  Bedside evaluation , monitoring , treatment of hemodynamics , fluids , IVP/ IVCD meds.        Diagnosis:     POD 3 - Repair Type A Aortic Dissection    Hypotension a/w Hypertension, requiring intravenous medication.     Fluid  overload     Hemodynamic lability,  instability. Requires IVCD [ x] vasopressors [ ] inotropes  [ x] vasodilator  [x ]IVSS fluid  to maintain MAP, perfusion, C.I.     CHF- acute [x ]   chronic [ ]    systolic [x ]   diastolic [ ]  Valvular [ ]          - Echo- EF -             [x ] RV dysfunction          - Cxr-cardiomegally, edema          - Clinical-  [x ]inotropes   [x ]pressors   [x ]diuresis   [x] Vasodilators    [ ]ECMO   [ ]LVAD   [x ] Respiratory insuffiencey     Respiratory insuffiencey     Remains Intubated Post op     Ventilator Management:  [x ]AC-rest    [ ]CPAP-PS Wean    [ ]Trach Collar     [ ]Extubate    [ ] T-Piece  [ ]peep>5     Requires chest PT, pulmonary toilet, ambu bagging, suctioning to maintain SaO2,  patent airway and treat atelectasis.     Encephalopathy     Seizures     Continuous EEG    Hypernatremia     Prerenal Azotemia     Temporary pacemaker (TPM) interrogation and setting.     Chest Tube Drainage / Management     Requires bedside physical therapy, mobilization and total [facility] care.                     -                     Discussed with CT surgeon, Physician's Assistant - Nurse Practitioner- Critical care medicine team.   Discussed at  AM / PM rounds.   Chart, labs , films reviewed.    Cumulative Critical Care Time Given Today : 105 min

## 2024-11-02 LAB
ANION GAP SERPL CALC-SCNC: 10 MMOL/L — SIGNIFICANT CHANGE UP (ref 5–17)
BASOPHILS # BLD AUTO: 0.01 K/UL — SIGNIFICANT CHANGE UP (ref 0–0.2)
BASOPHILS NFR BLD AUTO: 0.1 % — SIGNIFICANT CHANGE UP (ref 0–2)
BUN SERPL-MCNC: 38.1 MG/DL — HIGH (ref 8–20)
CALCIUM SERPL-MCNC: 7.5 MG/DL — LOW (ref 8.4–10.5)
CHLORIDE SERPL-SCNC: 112 MMOL/L — HIGH (ref 96–108)
CO2 SERPL-SCNC: 26 MMOL/L — SIGNIFICANT CHANGE UP (ref 22–29)
CREAT SERPL-MCNC: 1 MG/DL — SIGNIFICANT CHANGE UP (ref 0.5–1.3)
EGFR: 99 ML/MIN/1.73M2 — SIGNIFICANT CHANGE UP
EOSINOPHIL # BLD AUTO: 0.02 K/UL — SIGNIFICANT CHANGE UP (ref 0–0.5)
EOSINOPHIL NFR BLD AUTO: 0.2 % — SIGNIFICANT CHANGE UP (ref 0–6)
GAS PNL BLDA: SIGNIFICANT CHANGE UP
GLUCOSE BLDC GLUCOMTR-MCNC: 102 MG/DL — HIGH (ref 70–99)
GLUCOSE BLDC GLUCOMTR-MCNC: 105 MG/DL — HIGH (ref 70–99)
GLUCOSE BLDC GLUCOMTR-MCNC: 109 MG/DL — HIGH (ref 70–99)
GLUCOSE BLDC GLUCOMTR-MCNC: 117 MG/DL — HIGH (ref 70–99)
GLUCOSE SERPL-MCNC: 107 MG/DL — HIGH (ref 70–99)
HCT VFR BLD CALC: 27.5 % — LOW (ref 39–50)
HGB BLD-MCNC: 9.1 G/DL — LOW (ref 13–17)
IMM GRANULOCYTES NFR BLD AUTO: 1.1 % — HIGH (ref 0–0.9)
LYMPHOCYTES # BLD AUTO: 1.89 K/UL — SIGNIFICANT CHANGE UP (ref 1–3.3)
LYMPHOCYTES # BLD AUTO: 18.1 % — SIGNIFICANT CHANGE UP (ref 13–44)
MAGNESIUM SERPL-MCNC: 2.9 MG/DL — HIGH (ref 1.6–2.6)
MCHC RBC-ENTMCNC: 28.9 PG — SIGNIFICANT CHANGE UP (ref 27–34)
MCHC RBC-ENTMCNC: 33.1 G/DL — SIGNIFICANT CHANGE UP (ref 32–36)
MCV RBC AUTO: 87.3 FL — SIGNIFICANT CHANGE UP (ref 80–100)
MONOCYTES # BLD AUTO: 1.07 K/UL — HIGH (ref 0–0.9)
MONOCYTES NFR BLD AUTO: 10.3 % — SIGNIFICANT CHANGE UP (ref 2–14)
NEUTROPHILS # BLD AUTO: 7.33 K/UL — SIGNIFICANT CHANGE UP (ref 1.8–7.4)
NEUTROPHILS NFR BLD AUTO: 70.2 % — SIGNIFICANT CHANGE UP (ref 43–77)
PLATELET # BLD AUTO: 158 K/UL — SIGNIFICANT CHANGE UP (ref 150–400)
POTASSIUM SERPL-MCNC: 3.8 MMOL/L — SIGNIFICANT CHANGE UP (ref 3.5–5.3)
POTASSIUM SERPL-SCNC: 3.8 MMOL/L — SIGNIFICANT CHANGE UP (ref 3.5–5.3)
RBC # BLD: 3.15 M/UL — LOW (ref 4.2–5.8)
RBC # FLD: 14.6 % — HIGH (ref 10.3–14.5)
SODIUM SERPL-SCNC: 148 MMOL/L — HIGH (ref 135–145)
WBC # BLD: 10.43 K/UL — SIGNIFICANT CHANGE UP (ref 3.8–10.5)
WBC # FLD AUTO: 10.43 K/UL — SIGNIFICANT CHANGE UP (ref 3.8–10.5)

## 2024-11-02 PROCEDURE — 71045 X-RAY EXAM CHEST 1 VIEW: CPT | Mod: 26

## 2024-11-02 PROCEDURE — 99292 CRITICAL CARE ADDL 30 MIN: CPT | Mod: 24

## 2024-11-02 PROCEDURE — 99291 CRITICAL CARE FIRST HOUR: CPT | Mod: 24

## 2024-11-02 PROCEDURE — 93010 ELECTROCARDIOGRAM REPORT: CPT

## 2024-11-02 PROCEDURE — 99232 SBSQ HOSP IP/OBS MODERATE 35: CPT

## 2024-11-02 PROCEDURE — 95720 EEG PHY/QHP EA INCR W/VEEG: CPT

## 2024-11-02 RX ORDER — ACETAMINOPHEN 500 MG
1000 TABLET ORAL ONCE
Refills: 0 | Status: COMPLETED | OUTPATIENT
Start: 2024-11-02 | End: 2024-11-02

## 2024-11-02 RX ORDER — METHOCARBAMOL 500 MG/1
750 TABLET ORAL THREE TIMES A DAY
Refills: 0 | Status: DISCONTINUED | OUTPATIENT
Start: 2024-11-02 | End: 2024-11-11

## 2024-11-02 RX ORDER — LEVETIRACETAM 500 MG/1
1000 TABLET, FILM COATED ORAL EVERY 12 HOURS
Refills: 0 | Status: DISCONTINUED | OUTPATIENT
Start: 2024-11-02 | End: 2024-11-04

## 2024-11-02 RX ORDER — METOPROLOL TARTRATE 50 MG
25 TABLET ORAL
Refills: 0 | Status: DISCONTINUED | OUTPATIENT
Start: 2024-11-02 | End: 2024-11-03

## 2024-11-02 RX ORDER — ONDANSETRON HYDROCHLORIDE 2 MG/ML
4 INJECTION, SOLUTION INTRAMUSCULAR; INTRAVENOUS ONCE
Refills: 0 | Status: COMPLETED | OUTPATIENT
Start: 2024-11-02 | End: 2024-11-02

## 2024-11-02 RX ORDER — CALCIUM GLUCONATE 98 MG/ML
1 INJECTION, SOLUTION INTRAVENOUS ONCE
Refills: 0 | Status: COMPLETED | OUTPATIENT
Start: 2024-11-02 | End: 2024-11-02

## 2024-11-02 RX ORDER — HYDRALAZINE HYDROCHLORIDE 50 MG/1
10 TABLET, FILM COATED ORAL ONCE
Refills: 0 | Status: COMPLETED | OUTPATIENT
Start: 2024-11-02 | End: 2024-11-02

## 2024-11-02 RX ORDER — METOPROLOL TARTRATE 50 MG
25 TABLET ORAL ONCE
Refills: 0 | Status: COMPLETED | OUTPATIENT
Start: 2024-11-02 | End: 2024-11-02

## 2024-11-02 RX ORDER — INSULIN LISPRO 100/ML
VIAL (ML) SUBCUTANEOUS
Refills: 0 | Status: DISCONTINUED | OUTPATIENT
Start: 2024-11-02 | End: 2024-11-03

## 2024-11-02 RX ORDER — POTASSIUM CHLORIDE 10 MEQ
10 TABLET, EXTENDED RELEASE ORAL
Refills: 0 | Status: COMPLETED | OUTPATIENT
Start: 2024-11-02 | End: 2024-11-02

## 2024-11-02 RX ADMIN — Medication 50 MILLIEQUIVALENT(S): at 05:02

## 2024-11-02 RX ADMIN — Medication 400 MILLIGRAM(S): at 05:01

## 2024-11-02 RX ADMIN — DEXMEDETOMIDINE HYDROCHLORIDE 5.35 MICROGRAM(S)/KG/HR: 400 INJECTION, SOLUTION INTRAVENOUS at 02:34

## 2024-11-02 RX ADMIN — Medication 500 MILLIGRAM(S): at 05:01

## 2024-11-02 RX ADMIN — HYDRALAZINE HYDROCHLORIDE 10 MILLIGRAM(S): 50 TABLET, FILM COATED ORAL at 21:04

## 2024-11-02 RX ADMIN — CHLORHEXIDINE GLUCONATE 1 APPLICATION(S): 40 SOLUTION TOPICAL at 11:07

## 2024-11-02 RX ADMIN — Medication 500 MILLIGRAM(S): at 18:14

## 2024-11-02 RX ADMIN — METHOCARBAMOL 750 MILLIGRAM(S): 500 TABLET ORAL at 15:20

## 2024-11-02 RX ADMIN — CALCIUM GLUCONATE 100 GRAM(S): 98 INJECTION, SOLUTION INTRAVENOUS at 11:02

## 2024-11-02 RX ADMIN — LEVETIRACETAM 400 MILLIGRAM(S): 500 TABLET, FILM COATED ORAL at 18:16

## 2024-11-02 RX ADMIN — Medication 40 MILLIGRAM(S): at 05:02

## 2024-11-02 RX ADMIN — Medication 25 MILLIGRAM(S): at 19:56

## 2024-11-02 RX ADMIN — CHLORHEXIDINE GLUCONATE 1 APPLICATION(S): 40 SOLUTION TOPICAL at 05:01

## 2024-11-02 RX ADMIN — PANTOPRAZOLE SODIUM 40 MILLIGRAM(S): 40 TABLET, DELAYED RELEASE ORAL at 11:15

## 2024-11-02 RX ADMIN — LEVETIRACETAM 400 MILLIGRAM(S): 500 TABLET, FILM COATED ORAL at 05:01

## 2024-11-02 RX ADMIN — Medication 975 MILLIGRAM(S): at 11:01

## 2024-11-02 RX ADMIN — Medication 400 MILLIGRAM(S): at 04:12

## 2024-11-02 RX ADMIN — METHOCARBAMOL 750 MILLIGRAM(S): 500 TABLET ORAL at 21:04

## 2024-11-02 RX ADMIN — PROPOFOL 6.42 MICROGRAM(S)/KG/MIN: 10 INJECTION, EMULSION INTRAVENOUS at 03:50

## 2024-11-02 RX ADMIN — Medication 1000 MILLIGRAM(S): at 05:23

## 2024-11-02 RX ADMIN — Medication 1000 MILLIGRAM(S): at 19:30

## 2024-11-02 RX ADMIN — Medication 81 MILLIGRAM(S): at 11:05

## 2024-11-02 RX ADMIN — GABAPENTIN 100 MILLIGRAM(S): 300 CAPSULE ORAL at 13:13

## 2024-11-02 RX ADMIN — POLYETHYLENE GLYCOL 3350 17 GRAM(S): 17 POWDER, FOR SOLUTION ORAL at 11:02

## 2024-11-02 RX ADMIN — Medication 50 MILLIEQUIVALENT(S): at 03:49

## 2024-11-02 RX ADMIN — Medication 25 MILLIGRAM(S): at 12:30

## 2024-11-02 RX ADMIN — CHLORHEXIDINE GLUCONATE 15 MILLILITER(S): 40 SOLUTION TOPICAL at 05:01

## 2024-11-02 RX ADMIN — Medication 975 MILLIGRAM(S): at 12:00

## 2024-11-02 RX ADMIN — Medication 40 MILLIGRAM(S): at 11:05

## 2024-11-02 RX ADMIN — Medication 400 MILLIGRAM(S): at 18:40

## 2024-11-02 RX ADMIN — ONDANSETRON HYDROCHLORIDE 4 MILLIGRAM(S): 2 INJECTION, SOLUTION INTRAMUSCULAR; INTRAVENOUS at 06:33

## 2024-11-02 RX ADMIN — Medication 2 TABLET(S): at 21:04

## 2024-11-02 RX ADMIN — GABAPENTIN 100 MILLIGRAM(S): 300 CAPSULE ORAL at 21:03

## 2024-11-02 RX ADMIN — Medication 400 MILLIGRAM(S): at 18:14

## 2024-11-02 NOTE — PHYSICAL THERAPY INITIAL EVALUATION ADULT - GENERAL OBSERVATIONS, REHAB EVAL
awake in bed on 5L/min O2 via nc(placed on 60% VM by respiratory prior to mobility), +telemonitor with , +a-line, +sen, +VCDs, +right IJ; PA pulled chest tube prior to eval.

## 2024-11-02 NOTE — PROGRESS NOTE ADULT - SUBJECTIVE AND OBJECTIVE BOX
RADHA PALOMARES  [id number]    HPI:  38 y/o male with no pertinent past medical history presents to  with c/o chest tightness and pain, as per  chart patient described pain as a "squeezing" sensation across chest radiating down to leg. Pt reports that the pain woke him up at around 2am last night10/29 . He reportedly went to Urgent Care thought pain was muscular so was prescribed muscle relaxer. In  ED patient Pt had CTA chest significant for Aortic dissection which is noted from the level of the root of the aorta extending into the ascending, aortic arch, descending thoracic aorta as well as extending into the abdominal aorta and is noted to end in the distal left common iliac artery. The dissection flap is in close proximity to the right coronary artery. The left renal artery is arising from the false lumen. Pt being transferred to [facility] going directly to the OR admitted under Dr. Grimm  (29 Oct 2024 14:47)      Surgery/Hospital Course:  Repair of type A aortic dissection 29-Oct-2024   Bentall Procedure with reconstruction of RCA -  Today:  No acute events -     EEG Classification / Summary: 11/02/2024   Abnormal  EEG in the awake / drowsy / asleep state(s).   1. Focal continuous delta slowing and absence of faster frequencies over the right hemisphere   2. Generalized background slowing, moderate   Clinical Impression:   1. Findings suggestive of a structural abnormality in the right hemisphere   2. Moderate diffuse cerebral dysfunction.   There were no seizures recorded.    -  ICU Vital Signs Last 24 Hrs  T(C): 37.8 (02 Nov 2024 10:30), Max: 38.4 (01 Nov 2024 15:00)  T(F): 100 (02 Nov 2024 10:30), Max: 101.1 (01 Nov 2024 15:00)  HR: 84 (02 Nov 2024 10:30) (55 - 87)  BP: --  BP(mean): --  ABP: 138/58 (02 Nov 2024 10:30) (103/49 - 192/75)  ABP(mean): 80 (02 Nov 2024 10:30) (64 - 110)  RR: 24 (02 Nov 2024 10:30) (10 - 24)  SpO2: 91% (02 Nov 2024 10:30) (91% - 100%)    O2 Parameters below as of 02 Nov 2024 09:29    O2 Flow (L/min): 15  O2 Concentration (%): 40      Physical Exam:  Gen: awake , extubated  CNS: Left upper weakness          There is normal bulk and tone.         Neck: no JVD  RES : clear , no wheezing              CVS: Regular  rhythm. Normal S1/S2  Abd: Soft, non-distended. Bowel sounds present.  Skin: No rash.  Ext:  no edema--    ============================I/O===========================   I&O's Detail    01 Nov 2024 07:01  -  02 Nov 2024 07:00  --------------------------------------------------------  IN:    Dexmedetomidine: 155 mL    DOBUTamine: 3.2 mL    Enteral Tube Flush: 100 mL    IV PiggyBack: 100 mL    IV PiggyBack: 100 mL    IV PiggyBack: 300 mL    Norepinephrine: 3 mL    Propofol: 315.4 mL    sodium chloride 0.9%: 120 mL    sodium chloride 0.9%: 240 mL  Total IN: 1436.6 mL    OUT:    Chest Tube (mL): 25 mL    Chest Tube (mL): 140 mL    Indwelling Catheter - Urethral (mL): 2605 mL    Nasogastric/Oral tube (mL): 300 mL    NiCARdipine: 0 mL  Total OUT: 3070 mL    Total NET: -1633.4 mL      02 Nov 2024 08:01  -  02 Nov 2024 11:54  --------------------------------------------------------  IN:    Enteral Tube Flush: 175 mL    sodium chloride 0.9%: 20 mL    sodium chloride 0.9%: 40 mL  Total IN: 235 mL    OUT:    Chest Tube (mL): 40 mL    Indwelling Catheter - Urethral (mL): 400 mL  Total OUT: 440 mL    Total NET: -205 mL        ============================ LABS =========================                        9.1    10.43 )-----------( 158      ( 02 Nov 2024 01:30 )             27.5     11-02    148[H]  |  112[H]  |  38.1[H]  ----------------------------<  107[H]  3.8   |  26.0  |  1.00    Ca    7.5[L]      02 Nov 2024 01:30  Mg     2.9     11-02    TPro  6.0[L]  /  Alb  3.4  /  TBili  0.5  /  DBili  x   /  AST  39  /  ALT  28  /  AlkPhos  58  10-31    LIVER FUNCTIONS - ( 31 Oct 2024 21:31 )  Alb: 3.4 g/dL / Pro: 6.0 g/dL / ALK PHOS: 58 U/L / ALT: 28 U/L / AST: 39 U/L / GGT: x             ABG - ( 02 Nov 2024 09:13 )  pH, Arterial: 7.400 pH, Blood: x     /  pCO2: 47    /  pO2: 88    / HCO3: 29    / Base Excess: 4.3   /  SaO2: 99.1              Urinalysis Basic - ( 02 Nov 2024 01:30 )    Color: x / Appearance: x / SG: x / pH: x  Gluc: 107 mg/dL / Ketone: x  / Bili: x / Urobili: x   Blood: x / Protein: x / Nitrite: x   Leuk Esterase: x / RBC: x / WBC x   Sq Epi: x / Non Sq Epi: x / Bacteria: x      ======================Micro/Rad/Cardio=================  Culture: Reviewed   CXR: Reviewed  Echo:Reviewed  ======================================================  PAST MEDICAL & SURGICAL HISTORY:  No pertinent past medical history      No pertinent past medical history      No significant past surgical history      No significant past surgical history        ====================ASSESSMENT ==============   In  ED patient Pt had CTA chest significant for Aortic dissection which is noted from the level of the root of the aorta extending into the ascending, aortic arch, descending thoracic aorta as well as extending into the abdominal aorta and is noted to end in the distal left common iliac artery. The dissection flap is in close proximity to the right coronary artery. The left renal artery is arising from the false lumen. Pt being transferred to [facility] going directly to the OR admitted under Dr. Grimm    Dissection of ascending aorta and aortic arch-  S/p Repair of type A aortic dissection 29-Oct-2024          [facility] Procedure with reconstruction of RCA  Post op Hypovolemia  Post op respiratory insufficiency   Post op Seizures    Plan:  Extubate  DC propofol , DC Precedex  Invasive hemodynamic monitoring required for the following of MAP/SBP monitoring to ensure adequate CV support and to prevent decompensation    Beta blocker as tolerated by HR and SBP  Continue Aspirin daily  Respiratoty Status required the following of continuous pulse oximetry for support and to prevent decompensation   Chest PT and IS use with bedside nurse  Continue Diuresis with Lasix 40 mg IV qd  Continue GI ppx with Protonix and Senna  DVT ppx with Lovenox and SCD boots  Strict glucose control and management for SWI ppx , required adjustment of Insulin while following serial Glucose levels ad maintain euglycemia   Monitor Neuro status  Head and Neck CT  tomorrow  Continue Keppra per Neuro  Neuro Consult appreciated-    ====================== NEUROLOGY=====================  acetaminophen     Tablet .. 975 milliGRAM(s) Oral every 6 hours PRN Temp greater or equal to 38C (100.4F), Mild Pain (1 - 3), Moderate Pain (4 - 6), Severe Pain (7 - 10)  dexMEDEtomidine Infusion 0.2 MICROgram(s)/kG/Hr (5.35 mL/Hr) IV Continuous <Continuous>  dexMEDEtomidine Infusion 1.5 MICROgram(s)/kG/Hr (40.1 mL/Hr) IV Continuous <Continuous>  gabapentin 100 milliGRAM(s) Oral every 8 hours  levETIRAcetam  IVPB 1000 milliGRAM(s) IV Intermittent every 12 hours    ==================== RESPIRATORY======================  Post op respiratory insufficiency  ====================CARDIOVASCULAR==================  Post op Hypovolemia  aMIOdarone    Tablet 400 milliGRAM(s) Oral two times a day  furosemide   Injectable 40 milliGRAM(s) IV Push daily  metoprolol tartrate 25 milliGRAM(s) Oral two times a day  niCARdipine Infusion 5 mG/Hr (25 mL/Hr) IV Continuous <Continuous>  norepinephrine Infusion 0.05 MICROgram(s)/kG/Min (10 mL/Hr) IV Continuous <Continuous>    ===================HEMATOLOGIC/ONC ===================  Monitor H&H/Plts    aspirin  chewable 81 milliGRAM(s) Oral daily  enoxaparin Injectable 40 milliGRAM(s) SubCutaneous every 24 hours    ===================== RENAL =========================  Continue monitoring urine output, I&OS, BUN/Cr     ==================== GASTROINTESTINAL===================  ascorbic acid 500 milliGRAM(s) Oral two times a day  bisacodyl Suppository 10 milliGRAM(s) Rectal once  pantoprazole  Injectable 40 milliGRAM(s) IV Push daily  polyethylene glycol 3350 17 Gram(s) Oral daily  senna 2 Tablet(s) Oral at bedtime  sodium chloride 0.9% lock flush 10 milliLiter(s) IV Push every 1 hour PRN Pre/post blood products, medications, blood draw, and to maintain line patency  sodium chloride 0.9%. 1000 milliLiter(s) (10 mL/Hr) IV Continuous <Continuous>  sodium chloride 0.9%. 1000 milliLiter(s) (5 mL/Hr) IV Continuous <Continuous>    =======================    ENDOCRINE  =====================  dextrose 50% Injectable 50 milliLiter(s) IV Push every 15 minutes  dextrose 50% Injectable 25 milliLiter(s) IV Push every 15 minutes  insulin lispro (ADMELOG) corrective regimen sliding scale   SubCutaneous every 6 hours    ========================INFECTIOUS DISEASE================      -Monitor Neurologic status ,   -Head of the bed should remain elevated to 45 degrees,  -Monitor for arrhythmias and monitor parameters for organ perfusion,  -Glycemic control is satisfactory,  -Nutritional goals will be met using po eventually , insure adequate caloric intake and monitor the same ,  -Electrolytes have been repleted as necessary , pain control has been achieved  and wound care has been carried out ,  -Stress ulcer and VTE prophylaxis will be achieved,  -Agressive PT and early mobility and ambulation goals will be met,  -The family was updated about the course and plan .      I have spent 105 minutes providing acute care for this critically ill patient     Patient requires continuous monitoring with bedside rhythm monitoring, pulse ox monitoring, and intermittent blood gas analysis. Care plan discussed with ICU care team. Patient remained critical and at risk for life threatening decompensation.

## 2024-11-02 NOTE — PROGRESS NOTE ADULT - SUBJECTIVE AND OBJECTIVE BOX
[facility]                                        Neurology at [place]                                 Hany Becker & Raghavendra                                  [address]                                        [place], [place], [address]                                             [phone]        CC: Aortic dissection and altered mental status. Seizure.    HPI:   The patient is a 39y Male who presented to [facility] with severe back pain radiating to legs followed by chest pain. Imaging confirmed aortic dissection and he was transferred here for surgery on 10/29/24.  He has remained on mechanical ventilator since the surgery and has been poorly responsive when sedation held.   There was a question of twitching of the extremities and neurology evaluation requested 10/31/24.    Interim history:  Remains in [address] [place] [address].  Now extubated. Following instructions.     ROS:   Denies headache or dizziness.  Denies chest pain.  Denies shortness of breath.    MEDICATIONS  (STANDING):  aMIOdarone    Tablet 400 milliGRAM(s) Oral two times a day  ascorbic acid 500 milliGRAM(s) Oral two times a day  aspirin  chewable 81 milliGRAM(s) Oral daily  bisacodyl Suppository 10 milliGRAM(s) Rectal once  chlorhexidine 2% Cloths 1 Application(s) Topical daily  chlorhexidine 4% Liquid 1 Application(s) Topical <User Schedule>  dexMEDEtomidine Infusion 0.2 MICROgram(s)/kG/Hr (5.35 mL/Hr) IV Continuous <Continuous>  dexMEDEtomidine Infusion 1.5 MICROgram(s)/kG/Hr (40.1 mL/Hr) IV Continuous <Continuous>  dextrose 50% Injectable 50 milliLiter(s) IV Push every 15 minutes  dextrose 50% Injectable 25 milliLiter(s) IV Push every 15 minutes  enoxaparin Injectable 40 milliGRAM(s) SubCutaneous every 24 hours  furosemide   Injectable 40 milliGRAM(s) IV Push daily  gabapentin 100 milliGRAM(s) Oral every 8 hours  insulin lispro (ADMELOG) corrective regimen sliding scale   SubCutaneous every 6 hours  levETIRAcetam  IVPB 1000 milliGRAM(s) IV Intermittent every 12 hours  metoprolol tartrate 25 milliGRAM(s) Oral two times a day  niCARdipine Infusion 5 mG/Hr (25 mL/Hr) IV Continuous <Continuous>  norepinephrine Infusion 0.05 MICROgram(s)/kG/Min (10 mL/Hr) IV Continuous <Continuous>  pantoprazole  Injectable 40 milliGRAM(s) IV Push daily  polyethylene glycol 3350 17 Gram(s) Oral daily  senna 2 Tablet(s) Oral at bedtime  sodium chloride 0.9%. 1000 milliLiter(s) (10 mL/Hr) IV Continuous <Continuous>  sodium chloride 0.9%. 1000 milliLiter(s) (5 mL/Hr) IV Continuous <Continuous>    Vital Signs Last 24 Hrs  T(C): 37.8 (02 Nov 2024 10:30), Max: 38.4 (01 Nov 2024 15:00)  T(F): 100 (02 Nov 2024 10:30), Max: 101.1 (01 Nov 2024 15:00)  HR: 84 (02 Nov 2024 10:30) (55 - 87)  RR: 24 (02 Nov 2024 10:30) (10 - 24)  SpO2: 91% (02 Nov 2024 10:30) (91% - 100%)    Parameters below as of 02 Nov 2024 09:29    O2 Flow (L/min): 15  O2 Concentration (%): 40    Detailed Neurologic Exam:    Mental status: The patient is awake and alert. There is no aphasia. There is mild dysarthria.     Cranial nerves: Pupils equal and react symmetrically to light. There is no visual field deficit to threat. Extraocular motion is full with no nystagmus. Facial sensation is intact. Facial musculature is symmetric. Palate elevates symmetrically. Tongue is midline.    Motor: There is normal bulk and tone.  There is no tremor.  Strength grossly 5/5 on right.  Left side moving more slowly than right. Strength 4+/5 in left arm and leg. Left leg externally rotated.     Sensation: Withdraws right more rigorously than left.    Reflexes: 1 throughout and plantar responses are flexor.    Cerebellar: No dysmetria on finger nose testing.    Labs:     11-02    148[H]  |  112[H]  |  38.1[H]  ----------------------------<  107[H]  3.8   |  26.0  |  1.00    Ca    7.5[L]      02 Nov 2024 01:30  Mg     2.9     11-02    TPro  6.0[L]  /  Alb  3.4  /  TBili  0.5  /  DBili  x   /  AST  39  /  ALT  28  /  AlkPhos  58  10-31                            9.1    10.43 )-----------( 158      ( 02 Nov 2024 01:30 )             27.5       EEG:    Abnormal  EEG in the awake / drowsy / asleep state(s).   1. Focal continuous delta slowing and absence of faster frequencies over the right hemisphere   2. Generalized background slowing, moderate

## 2024-11-02 NOTE — PHYSICAL THERAPY INITIAL EVALUATION ADULT - PERTINENT HX OF CURRENT PROBLEM, REHAB EVAL
40 y/o male with no pertinent past medical history presents to  with c/o chest tightness and pain, as per  chart patient described pain as a "squeezing" sensation across chest radiating down to leg. Pt reports that the pain woke him up at around 2am last night10/29 . He reportedly went to Urgent Care thought pain was muscular so was prescribed muscle relaxer. In  ED patient Pt had CTA chest significant for Aortic dissection which is noted from the level of the root of the aorta extending into the ascending, aortic arch, descending thoracic aorta as well as extending into the abdominal aorta and is noted to end in the distal left common iliac artery. The dissection flap is in close proximity to the right coronary artery. The left renal artery is arising from the false lumen. Pt being transferred to [facility] going directly to the OR admitted under

## 2024-11-02 NOTE — EEG REPORT - NS EEG TEXT BOX
RADHA PALOMARES [id number]     Study Date: 		11-01-24 (0800)    11-02-24 (0800)  Duration: 24 hr  --------------------------------------------------------------------------------------------------  History:  CC/ HPI Patient is a 39y old  Male who presents with a chief complaint of Type A dissection (01 Nov 2024 15:33)    MEDICATIONS  (STANDING):  aMIOdarone    Tablet 400 milliGRAM(s) Oral two times a day  ascorbic acid 500 milliGRAM(s) Oral two times a day  aspirin  chewable 81 milliGRAM(s) Oral daily  bisacodyl Suppository 10 milliGRAM(s) Rectal once  chlorhexidine 0.12% Liquid 15 milliLiter(s) Oral Mucosa every 12 hours  chlorhexidine 2% Cloths 1 Application(s) Topical daily  chlorhexidine 4% Liquid 1 Application(s) Topical <User Schedule>  dexMEDEtomidine Infusion 1.5 MICROgram(s)/kG/Hr (40.1 mL/Hr) IV Continuous <Continuous>  dexMEDEtomidine Infusion 0.2 MICROgram(s)/kG/Hr (5.35 mL/Hr) IV Continuous <Continuous>  dextrose 50% Injectable 50 milliLiter(s) IV Push every 15 minutes  dextrose 50% Injectable 25 milliLiter(s) IV Push every 15 minutes  enoxaparin Injectable 40 milliGRAM(s) SubCutaneous every 24 hours  furosemide   Injectable 40 milliGRAM(s) IV Push daily  gabapentin 100 milliGRAM(s) Oral every 8 hours  insulin lispro (ADMELOG) corrective regimen sliding scale   SubCutaneous every 6 hours  levETIRAcetam  IVPB 1000 milliGRAM(s) IV Intermittent every 12 hours  niCARdipine Infusion 5 mG/Hr (25 mL/Hr) IV Continuous <Continuous>  norepinephrine Infusion 0.05 MICROgram(s)/kG/Min (10 mL/Hr) IV Continuous <Continuous>  pantoprazole  Injectable 40 milliGRAM(s) IV Push daily  polyethylene glycol 3350 17 Gram(s) Oral daily  propofol Infusion 10 MICROgram(s)/kG/Min (6.42 mL/Hr) IV Continuous <Continuous>  senna 2 Tablet(s) Oral at bedtime  sodium chloride 0.9%. 1000 milliLiter(s) (10 mL/Hr) IV Continuous <Continuous>  sodium chloride 0.9%. 1000 milliLiter(s) (5 mL/Hr) IV Continuous <Continuous>    --------------------------------------------------------------------------------------------------  Study Interpretation:    [[[Abbreviation Key:  PDR=alpha rhythm/posterior dominant rhythm. A-P=anterior posterior gradient.  Amplitude: ‘very low’:<20; ‘low’:20-50; ‘medium’:; ‘high’:>200uV.  Persistence for periodic/rhythmic patterns (% of epoch) ‘rare’:<1%; ‘occasional’:1-10%; ‘frequent’:10-50%; ‘abundant’:50-90%; ‘continuous’:>90%.  Persistence for sporadic discharges: ‘rare’:<1/hr; ‘occasional’:1/min-1/hr; ‘frequent’:>1/min; ‘abundant’:>1/10 sec.  GRDA=generalized rhythmic delta activity; FIRDA=frontal intermittent GRDA; LRDA=lateralized rhythmic delta activity; TIRDA=temporal intermittent rhythmic delta activity;  LPD=PLED=lateralized periodic discharges; GPD=generalized periodic discharges; BiPDs=BiPLEDs=bilateral independent periodic epileptiform discharges; SIRPID=stimulus induced rhythmic, periodic, or ictal appearing discharges; BIRDs=brief potentially ictal rhythmic discharges >4 Hz, lasting .5-10s; PFA=paroxysmal bursts of beta/gamma; LVFA=low voltage fast activity.  Modifiers: +F=with fast component; +S=with spike component; +R=with rhythmic component.  S-B=burst suppression pattern.  Max=maximal. N1-drowsy; N2-stage II sleep; N3-slow wave sleep. SSS/BETS=small sharp spikes/benign epileptiform transients of sleep. HV=hyperventilation; PS=photic stimulation]]]    Daily EEG Visual Analysis    FINDINGS:      Background:  Continuity: continuous  Symmetry: asymmetric delta slowing over the right side  PDR: None present  Reactivity: present  Voltage: normal, mostly 20-150uV  Anterior Posterior Gradient: present  Other background findings: none  Breach: absent    Background Slowing:  Generalized slowing: Diffuse polymorphic mixed theta-delta slowing   Focal slowing: Continuous delta slowing and absence of faster frequencies over the right hemisphere     State Changes:   -Drowsiness noted with increased slowing, attenuation of fast activity, vertex transients.  -Present with N2 sleep transients with symmetric spindles and K-complexes.      Sporadic Epileptiform Discharges:    None    Rhythmic and Periodic Patterns (RPPs):  None     Electrographic and Electroclinical seizures:  None    Other Clinical Events:  None    Activation Procedures:   -Hyperventilation was not performed.    -Photic stimulation was not performed.      Artifacts:  Intermittent myogenic and movement artifacts were noted.    ECG:  The heart rate on single channel ECG was predominantly between 60-80 BPM.    EEG Classification / Summary:   Abnormal  EEG in the awake / drowsy / asleep state(s).   1. Focal continuous delta slowing and absence of faster frequencies over the right hemisphere   2. Generalized background slowing, moderate     Clinical Impression:   1. Findings suggestive of a structural abnormality in the right hemisphere   2. Moderate diffuse cerebral dysfunction.     There were no seizures recorded.      *PRELIM READ, ATTENDING ATTESTATION PENDING*      -------------------------------------------------------------------------------------------------------  [facility] EEG Reading Room Ph#: [phone]  Epilepsy Answering Service after 5PM and before 8:30AM: Ph#: [phone]    Yazan Holbrook,    Epilepsy Fellow    RADHA PALOMARES [id number]     Study Date: 		11-01-24 (0800)    11-02-24 (0800)  Duration: 24 hr  --------------------------------------------------------------------------------------------------  History:  CC/ HPI Patient is a 39y old  Male who presents with a chief complaint of Type A dissection (01 Nov 2024 15:33)    MEDICATIONS  (STANDING):  aMIOdarone    Tablet 400 milliGRAM(s) Oral two times a day  ascorbic acid 500 milliGRAM(s) Oral two times a day  aspirin  chewable 81 milliGRAM(s) Oral daily  bisacodyl Suppository 10 milliGRAM(s) Rectal once  chlorhexidine 0.12% Liquid 15 milliLiter(s) Oral Mucosa every 12 hours  chlorhexidine 2% Cloths 1 Application(s) Topical daily  chlorhexidine 4% Liquid 1 Application(s) Topical <User Schedule>  dexMEDEtomidine Infusion 1.5 MICROgram(s)/kG/Hr (40.1 mL/Hr) IV Continuous <Continuous>  dexMEDEtomidine Infusion 0.2 MICROgram(s)/kG/Hr (5.35 mL/Hr) IV Continuous <Continuous>  dextrose 50% Injectable 50 milliLiter(s) IV Push every 15 minutes  dextrose 50% Injectable 25 milliLiter(s) IV Push every 15 minutes  enoxaparin Injectable 40 milliGRAM(s) SubCutaneous every 24 hours  furosemide   Injectable 40 milliGRAM(s) IV Push daily  gabapentin 100 milliGRAM(s) Oral every 8 hours  insulin lispro (ADMELOG) corrective regimen sliding scale   SubCutaneous every 6 hours  levETIRAcetam  IVPB 1000 milliGRAM(s) IV Intermittent every 12 hours  niCARdipine Infusion 5 mG/Hr (25 mL/Hr) IV Continuous <Continuous>  norepinephrine Infusion 0.05 MICROgram(s)/kG/Min (10 mL/Hr) IV Continuous <Continuous>  pantoprazole  Injectable 40 milliGRAM(s) IV Push daily  polyethylene glycol 3350 17 Gram(s) Oral daily  propofol Infusion 10 MICROgram(s)/kG/Min (6.42 mL/Hr) IV Continuous <Continuous>  senna 2 Tablet(s) Oral at bedtime  sodium chloride 0.9%. 1000 milliLiter(s) (10 mL/Hr) IV Continuous <Continuous>  sodium chloride 0.9%. 1000 milliLiter(s) (5 mL/Hr) IV Continuous <Continuous>    --------------------------------------------------------------------------------------------------  Study Interpretation:    [[[Abbreviation Key:  PDR=alpha rhythm/posterior dominant rhythm. A-P=anterior posterior gradient.  Amplitude: ‘very low’:<20; ‘low’:20-50; ‘medium’:; ‘high’:>200uV.  Persistence for periodic/rhythmic patterns (% of epoch) ‘rare’:<1%; ‘occasional’:1-10%; ‘frequent’:10-50%; ‘abundant’:50-90%; ‘continuous’:>90%.  Persistence for sporadic discharges: ‘rare’:<1/hr; ‘occasional’:1/min-1/hr; ‘frequent’:>1/min; ‘abundant’:>1/10 sec.  GRDA=generalized rhythmic delta activity; FIRDA=frontal intermittent GRDA; LRDA=lateralized rhythmic delta activity; TIRDA=temporal intermittent rhythmic delta activity;  LPD=PLED=lateralized periodic discharges; GPD=generalized periodic discharges; BiPDs=BiPLEDs=bilateral independent periodic epileptiform discharges; SIRPID=stimulus induced rhythmic, periodic, or ictal appearing discharges; BIRDs=brief potentially ictal rhythmic discharges >4 Hz, lasting .5-10s; PFA=paroxysmal bursts of beta/gamma; LVFA=low voltage fast activity.  Modifiers: +F=with fast component; +S=with spike component; +R=with rhythmic component.  S-B=burst suppression pattern.  Max=maximal. N1-drowsy; N2-stage II sleep; N3-slow wave sleep. SSS/BETS=small sharp spikes/benign epileptiform transients of sleep. HV=hyperventilation; PS=photic stimulation]]]    Daily EEG Visual Analysis    FINDINGS:      Background:  Continuity: continuous  Symmetry: none  PDR: None present  Reactivity: present  Voltage: normal, mostly 20-150uV  Anterior Posterior Gradient: present  Other background findings: none  Breach: absent    Background Slowing:  Generalized slowing: Diffuse polymorphic mixed theta-delta slowing   Focal slowing: Continuous delta slowing and absence of faster frequencies over the right hemisphere     State Changes:   -Drowsiness noted with increased slowing, attenuation of fast activity, vertex transients.  -Present with N2 sleep transients with symmetric spindles and K-complexes.      Sporadic Epileptiform Discharges:    None    Rhythmic and Periodic Patterns (RPPs):  None     Electrographic and Electroclinical seizures:  None    Other Clinical Events:  None    Activation Procedures:   -Hyperventilation was not performed.    -Photic stimulation was not performed.      Artifacts:  Intermittent myogenic and movement artifacts were noted.    ECG:  The heart rate on single channel ECG was predominantly between 60-80 BPM.    EEG Classification / Summary:   Abnormal  EEG in the awake / drowsy / asleep state(s).   1. Focal continuous delta slowing and absence of faster frequencies over the right hemisphere   2. Generalized background slowing, moderate     Clinical Impression:   1. Findings suggestive of a structural abnormality in the right hemisphere   2. Moderate diffuse cerebral dysfunction.     There were no seizures recorded.      -------------------------------------------------------------------------------------------------------  [facility] EEG Reading Room Ph#: [phone]  Epilepsy Answering Service after 5PM and before 8:30AM: Ph#: [phone]    Yazan Holbrook DO   Epilepsy Fellow     Adrien Garcia MD  Neurology Attending Physician

## 2024-11-02 NOTE — PHYSICAL THERAPY INITIAL EVALUATION ADULT - PASSIVE RANGE OF MOTION EXAMINATION, REHAB EVAL
wheezing
bilateral upper extremity Passive ROM was WFL (within functional limits)/bilateral lower extremity Passive ROM was WFL (within functional limits)

## 2024-11-02 NOTE — PHYSICAL THERAPY INITIAL EVALUATION ADULT - ADDITIONAL COMMENTS
pt lives with his wife in a 1-story house with 2 steps to enter (no rail). basement but pt does not need to go down. wife works but is currently available to assist.

## 2024-11-03 LAB
ANION GAP SERPL CALC-SCNC: 11 MMOL/L — SIGNIFICANT CHANGE UP (ref 5–17)
ANISOCYTOSIS BLD QL: SLIGHT — SIGNIFICANT CHANGE UP
BASOPHILS # BLD AUTO: 0 K/UL — SIGNIFICANT CHANGE UP (ref 0–0.2)
BASOPHILS NFR BLD AUTO: 0 % — SIGNIFICANT CHANGE UP (ref 0–2)
BUN SERPL-MCNC: 26.3 MG/DL — HIGH (ref 8–20)
CALCIUM SERPL-MCNC: 7.5 MG/DL — LOW (ref 8.4–10.5)
CHLORIDE SERPL-SCNC: 103 MMOL/L — SIGNIFICANT CHANGE UP (ref 96–108)
CO2 SERPL-SCNC: 24 MMOL/L — SIGNIFICANT CHANGE UP (ref 22–29)
CREAT SERPL-MCNC: 0.84 MG/DL — SIGNIFICANT CHANGE UP (ref 0.5–1.3)
EGFR: 115 ML/MIN/1.73M2 — SIGNIFICANT CHANGE UP
EOSINOPHIL # BLD AUTO: 0.15 K/UL — SIGNIFICANT CHANGE UP (ref 0–0.5)
EOSINOPHIL NFR BLD AUTO: 0.9 % — SIGNIFICANT CHANGE UP (ref 0–6)
GAS PNL BLDA: SIGNIFICANT CHANGE UP
GLUCOSE BLDC GLUCOMTR-MCNC: 121 MG/DL — HIGH (ref 70–99)
GLUCOSE SERPL-MCNC: 114 MG/DL — HIGH (ref 70–99)
HCT VFR BLD CALC: 28.9 % — LOW (ref 39–50)
HGB BLD-MCNC: 9.7 G/DL — LOW (ref 13–17)
LYMPHOCYTES # BLD AUTO: 0.99 K/UL — LOW (ref 1–3.3)
LYMPHOCYTES # BLD AUTO: 6.1 % — LOW (ref 13–44)
MAGNESIUM SERPL-MCNC: 2.4 MG/DL — SIGNIFICANT CHANGE UP (ref 1.6–2.6)
MANUAL SMEAR VERIFICATION: SIGNIFICANT CHANGE UP
MCHC RBC-ENTMCNC: 28.9 PG — SIGNIFICANT CHANGE UP (ref 27–34)
MCHC RBC-ENTMCNC: 33.6 G/DL — SIGNIFICANT CHANGE UP (ref 32–36)
MCV RBC AUTO: 86 FL — SIGNIFICANT CHANGE UP (ref 80–100)
MICROCYTES BLD QL: SLIGHT — SIGNIFICANT CHANGE UP
MONOCYTES # BLD AUTO: 1.57 K/UL — HIGH (ref 0–0.9)
MONOCYTES NFR BLD AUTO: 9.7 % — SIGNIFICANT CHANGE UP (ref 2–14)
NEUTROPHILS # BLD AUTO: 13.46 K/UL — HIGH (ref 1.8–7.4)
NEUTROPHILS NFR BLD AUTO: 83.3 % — HIGH (ref 43–77)
NRBC # BLD: 1 /100 WBCS — HIGH (ref 0–0)
PLAT MORPH BLD: NORMAL — SIGNIFICANT CHANGE UP
PLATELET # BLD AUTO: 214 K/UL — SIGNIFICANT CHANGE UP (ref 150–400)
POLYCHROMASIA BLD QL SMEAR: SLIGHT — SIGNIFICANT CHANGE UP
POTASSIUM SERPL-MCNC: 3.5 MMOL/L — SIGNIFICANT CHANGE UP (ref 3.5–5.3)
POTASSIUM SERPL-SCNC: 3.5 MMOL/L — SIGNIFICANT CHANGE UP (ref 3.5–5.3)
RBC # BLD: 3.36 M/UL — LOW (ref 4.2–5.8)
RBC # FLD: 13.8 % — SIGNIFICANT CHANGE UP (ref 10.3–14.5)
RBC BLD AUTO: NORMAL — SIGNIFICANT CHANGE UP
SMUDGE CELLS # BLD: PRESENT — SIGNIFICANT CHANGE UP
SODIUM SERPL-SCNC: 138 MMOL/L — SIGNIFICANT CHANGE UP (ref 135–145)
WBC # BLD: 16.16 K/UL — HIGH (ref 3.8–10.5)
WBC # FLD AUTO: 16.16 K/UL — HIGH (ref 3.8–10.5)

## 2024-11-03 PROCEDURE — 99232 SBSQ HOSP IP/OBS MODERATE 35: CPT

## 2024-11-03 PROCEDURE — 99291 CRITICAL CARE FIRST HOUR: CPT | Mod: 24

## 2024-11-03 PROCEDURE — 71045 X-RAY EXAM CHEST 1 VIEW: CPT | Mod: 26

## 2024-11-03 RX ORDER — HYDRALAZINE HYDROCHLORIDE 50 MG/1
10 TABLET, FILM COATED ORAL ONCE
Refills: 0 | Status: COMPLETED | OUTPATIENT
Start: 2024-11-03 | End: 2024-11-03

## 2024-11-03 RX ORDER — METOPROLOL TARTRATE 50 MG
5 TABLET ORAL ONCE
Refills: 0 | Status: COMPLETED | OUTPATIENT
Start: 2024-11-03 | End: 2024-11-03

## 2024-11-03 RX ORDER — METOPROLOL TARTRATE 50 MG
50 TABLET ORAL
Refills: 0 | Status: DISCONTINUED | OUTPATIENT
Start: 2024-11-03 | End: 2024-11-11

## 2024-11-03 RX ORDER — HYDROMORPHONE HCL/0.9% NACL/PF 6 MG/30 ML
0.25 PATIENT CONTROLLED ANALGESIA SYRINGE INTRAVENOUS ONCE
Refills: 0 | Status: DISCONTINUED | OUTPATIENT
Start: 2024-11-03 | End: 2024-11-03

## 2024-11-03 RX ORDER — PANTOPRAZOLE SODIUM 40 MG/1
40 TABLET, DELAYED RELEASE ORAL
Refills: 0 | Status: DISCONTINUED | OUTPATIENT
Start: 2024-11-03 | End: 2024-11-11

## 2024-11-03 RX ORDER — FUROSEMIDE 40 MG
40 TABLET ORAL DAILY
Refills: 0 | Status: DISCONTINUED | OUTPATIENT
Start: 2024-11-03 | End: 2024-11-11

## 2024-11-03 RX ORDER — ACETAMINOPHEN 500 MG
1000 TABLET ORAL ONCE
Refills: 0 | Status: COMPLETED | OUTPATIENT
Start: 2024-11-03 | End: 2024-11-03

## 2024-11-03 RX ORDER — CLONIDINE HYDROCHLORIDE 0.2 MG/1
0.1 TABLET ORAL THREE TIMES A DAY
Refills: 0 | Status: DISCONTINUED | OUTPATIENT
Start: 2024-11-03 | End: 2024-11-11

## 2024-11-03 RX ORDER — POTASSIUM CHLORIDE 10 MEQ
40 TABLET, EXTENDED RELEASE ORAL ONCE
Refills: 0 | Status: COMPLETED | OUTPATIENT
Start: 2024-11-03 | End: 2024-11-03

## 2024-11-03 RX ORDER — METOPROLOL TARTRATE 50 MG
25 TABLET ORAL ONCE
Refills: 0 | Status: COMPLETED | OUTPATIENT
Start: 2024-11-03 | End: 2024-11-03

## 2024-11-03 RX ORDER — ALBUMIN HUMAN 50 G/1000ML
250 SOLUTION INTRAVENOUS ONCE
Refills: 0 | Status: COMPLETED | OUTPATIENT
Start: 2024-11-03 | End: 2024-11-03

## 2024-11-03 RX ADMIN — LEVETIRACETAM 400 MILLIGRAM(S): 500 TABLET, FILM COATED ORAL at 17:47

## 2024-11-03 RX ADMIN — Medication 25 MILLIGRAM(S): at 04:58

## 2024-11-03 RX ADMIN — Medication 975 MILLIGRAM(S): at 11:36

## 2024-11-03 RX ADMIN — Medication 25 MILLIGRAM(S): at 08:27

## 2024-11-03 RX ADMIN — HYDRALAZINE HYDROCHLORIDE 10 MILLIGRAM(S): 50 TABLET, FILM COATED ORAL at 03:01

## 2024-11-03 RX ADMIN — Medication 2 TABLET(S): at 21:08

## 2024-11-03 RX ADMIN — METHOCARBAMOL 750 MILLIGRAM(S): 500 TABLET ORAL at 14:34

## 2024-11-03 RX ADMIN — Medication 0.25 MILLIGRAM(S): at 06:00

## 2024-11-03 RX ADMIN — GABAPENTIN 100 MILLIGRAM(S): 300 CAPSULE ORAL at 14:34

## 2024-11-03 RX ADMIN — GABAPENTIN 100 MILLIGRAM(S): 300 CAPSULE ORAL at 21:08

## 2024-11-03 RX ADMIN — CLONIDINE HYDROCHLORIDE 0.1 MILLIGRAM(S): 0.2 TABLET ORAL at 09:38

## 2024-11-03 RX ADMIN — Medication 5 MILLIGRAM(S): at 07:03

## 2024-11-03 RX ADMIN — Medication 50 MILLIGRAM(S): at 17:40

## 2024-11-03 RX ADMIN — Medication 500 MILLIGRAM(S): at 04:57

## 2024-11-03 RX ADMIN — Medication 400 MILLIGRAM(S): at 00:47

## 2024-11-03 RX ADMIN — Medication 500 MILLIGRAM(S): at 17:40

## 2024-11-03 RX ADMIN — Medication 1000 MILLIGRAM(S): at 01:47

## 2024-11-03 RX ADMIN — Medication 40 MILLIEQUIVALENT(S): at 04:56

## 2024-11-03 RX ADMIN — Medication 40 MILLIGRAM(S): at 04:57

## 2024-11-03 RX ADMIN — Medication 975 MILLIGRAM(S): at 17:40

## 2024-11-03 RX ADMIN — Medication 975 MILLIGRAM(S): at 12:00

## 2024-11-03 RX ADMIN — HYDRALAZINE HYDROCHLORIDE 10 MILLIGRAM(S): 50 TABLET, FILM COATED ORAL at 09:39

## 2024-11-03 RX ADMIN — Medication 400 MILLIGRAM(S): at 17:39

## 2024-11-03 RX ADMIN — CHLORHEXIDINE GLUCONATE 1 APPLICATION(S): 40 SOLUTION TOPICAL at 11:37

## 2024-11-03 RX ADMIN — ALBUMIN HUMAN 125 MILLILITER(S): 50 SOLUTION INTRAVENOUS at 13:35

## 2024-11-03 RX ADMIN — CHLORHEXIDINE GLUCONATE 1 APPLICATION(S): 40 SOLUTION TOPICAL at 05:05

## 2024-11-03 RX ADMIN — Medication 0.25 MILLIGRAM(S): at 05:48

## 2024-11-03 RX ADMIN — GABAPENTIN 100 MILLIGRAM(S): 300 CAPSULE ORAL at 04:57

## 2024-11-03 RX ADMIN — LEVETIRACETAM 400 MILLIGRAM(S): 500 TABLET, FILM COATED ORAL at 05:05

## 2024-11-03 RX ADMIN — METHOCARBAMOL 750 MILLIGRAM(S): 500 TABLET ORAL at 04:57

## 2024-11-03 RX ADMIN — METHOCARBAMOL 750 MILLIGRAM(S): 500 TABLET ORAL at 21:08

## 2024-11-03 RX ADMIN — Medication 975 MILLIGRAM(S): at 18:10

## 2024-11-03 RX ADMIN — CLONIDINE HYDROCHLORIDE 0.1 MILLIGRAM(S): 0.2 TABLET ORAL at 21:08

## 2024-11-03 RX ADMIN — Medication 5 MILLIGRAM(S): at 03:53

## 2024-11-03 RX ADMIN — Medication 81 MILLIGRAM(S): at 11:36

## 2024-11-03 RX ADMIN — Medication 400 MILLIGRAM(S): at 04:57

## 2024-11-03 RX ADMIN — Medication 40 MILLIGRAM(S): at 11:35

## 2024-11-03 RX ADMIN — PANTOPRAZOLE SODIUM 40 MILLIGRAM(S): 40 TABLET, DELAYED RELEASE ORAL at 11:35

## 2024-11-03 NOTE — EEG REPORT - NS EEG TEXT BOX
Study Date: 		11-02-24 (0800)    11-03-24 (0800)  *poorly visualized as of about 0600   Duration: 24 hr  --------------------------------------------------------------------------------------------------  History:  CC/ HPI Patient is a 39y old  Male who presents with a chief complaint of Type A dissection (01 Nov 2024 15:33)    MEDICATIONS  (STANDING):  aMIOdarone    Tablet 400 milliGRAM(s) Oral two times a day  ascorbic acid 500 milliGRAM(s) Oral two times a day  aspirin  chewable 81 milliGRAM(s) Oral daily  bisacodyl Suppository 10 milliGRAM(s) Rectal once  chlorhexidine 0.12% Liquid 15 milliLiter(s) Oral Mucosa every 12 hours  chlorhexidine 2% Cloths 1 Application(s) Topical daily  chlorhexidine 4% Liquid 1 Application(s) Topical <User Schedule>  dexMEDEtomidine Infusion 1.5 MICROgram(s)/kG/Hr (40.1 mL/Hr) IV Continuous <Continuous>  dexMEDEtomidine Infusion 0.2 MICROgram(s)/kG/Hr (5.35 mL/Hr) IV Continuous <Continuous>  dextrose 50% Injectable 50 milliLiter(s) IV Push every 15 minutes  dextrose 50% Injectable 25 milliLiter(s) IV Push every 15 minutes  enoxaparin Injectable 40 milliGRAM(s) SubCutaneous every 24 hours  furosemide   Injectable 40 milliGRAM(s) IV Push daily  gabapentin 100 milliGRAM(s) Oral every 8 hours  insulin lispro (ADMELOG) corrective regimen sliding scale   SubCutaneous every 6 hours  levETIRAcetam  IVPB 1000 milliGRAM(s) IV Intermittent every 12 hours  niCARdipine Infusion 5 mG/Hr (25 mL/Hr) IV Continuous <Continuous>  norepinephrine Infusion 0.05 MICROgram(s)/kG/Min (10 mL/Hr) IV Continuous <Continuous>  pantoprazole  Injectable 40 milliGRAM(s) IV Push daily  polyethylene glycol 3350 17 Gram(s) Oral daily  propofol Infusion 10 MICROgram(s)/kG/Min (6.42 mL/Hr) IV Continuous <Continuous>  senna 2 Tablet(s) Oral at bedtime  sodium chloride 0.9%. 1000 milliLiter(s) (10 mL/Hr) IV Continuous <Continuous>  sodium chloride 0.9%. 1000 milliLiter(s) (5 mL/Hr) IV Continuous <Continuous>    --------------------------------------------------------------------------------------------------  Study Interpretation:    [[[Abbreviation Key:  PDR=alpha rhythm/posterior dominant rhythm. A-P=anterior posterior gradient.  Amplitude: ‘very low’:<20; ‘low’:20-50; ‘medium’:; ‘high’:>200uV.  Persistence for periodic/rhythmic patterns (% of epoch) ‘rare’:<1%; ‘occasional’:1-10%; ‘frequent’:10-50%; ‘abundant’:50-90%; ‘continuous’:>90%.  Persistence for sporadic discharges: ‘rare’:<1/hr; ‘occasional’:1/min-1/hr; ‘frequent’:>1/min; ‘abundant’:>1/10 sec.  GRDA=generalized rhythmic delta activity; FIRDA=frontal intermittent GRDA; LRDA=lateralized rhythmic delta activity; TIRDA=temporal intermittent rhythmic delta activity;  LPD=PLED=lateralized periodic discharges; GPD=generalized periodic discharges; BiPDs=BiPLEDs=bilateral independent periodic epileptiform discharges; SIRPID=stimulus induced rhythmic, periodic, or ictal appearing discharges; BIRDs=brief potentially ictal rhythmic discharges >4 Hz, lasting .5-10s; PFA=paroxysmal bursts of beta/gamma; LVFA=low voltage fast activity.  Modifiers: +F=with fast component; +S=with spike component; +R=with rhythmic component.  S-B=burst suppression pattern.  Max=maximal. N1-drowsy; N2-stage II sleep; N3-slow wave sleep. SSS/BETS=small sharp spikes/benign epileptiform transients of sleep. HV=hyperventilation; PS=photic stimulation]]]    Daily EEG Visual Analysis    FINDINGS:      Background:  Continuity: continuous  Symmetry: none  PDR: None present  Reactivity: present  Voltage: normal, mostly 20-150uV  Anterior Posterior Gradient: present  Other background findings: none  Breach: absent    Background Slowing:  Generalized slowing: Diffuse polymorphic mixed theta-delta slowing   Focal slowing: Continuous delta slowing and absence of faster frequencies over the right hemisphere     State Changes:   -Drowsiness noted with increased slowing, attenuation of fast activity, vertex transients.  -Present with N2 sleep transients with symmetric spindles and K-complexes.      Sporadic Epileptiform Discharges:    None    Rhythmic and Periodic Patterns (RPPs):  None     Electrographic and Electroclinical seizures:  None    Other Clinical Events:  None    Activation Procedures:   -Hyperventilation was not performed.    -Photic stimulation was not performed.      Artifacts:  Intermittent myogenic and movement artifacts were noted.    ECG:  The heart rate on single channel ECG was predominantly between 60-80 BPM.    EEG Classification / Summary:   Abnormal  EEG in the awake / drowsy / asleep state(s).   1. Focal continuous delta slowing and absence of faster frequencies over the right hemisphere   2. Generalized background slowing, moderate     Clinical Impression:   1. Findings suggestive of a structural abnormality in the right hemisphere   2. Moderate diffuse cerebral dysfunction.     There were no seizures recorded.      -------------------------------------------------------------------------------------------------------  [facility] EEG Reading Room Ph#: [phone]  Epilepsy Answering Service after 5PM and before 8:30AM: Ph#: [phone]    Yazan Holbrook DO   Epilepsy Fellow      Study Date: 		11-02-24 (0800)    11-03-24 (0800)  *poorly visualized as of about 0600   Duration: 24 hr  --------------------------------------------------------------------------------------------------  History:  CC/ HPI Patient is a 39y old  Male who presents with a chief complaint of Type A dissection (01 Nov 2024 15:33)    MEDICATIONS  (STANDING):  aMIOdarone    Tablet 400 milliGRAM(s) Oral two times a day  ascorbic acid 500 milliGRAM(s) Oral two times a day  aspirin  chewable 81 milliGRAM(s) Oral daily  bisacodyl Suppository 10 milliGRAM(s) Rectal once  chlorhexidine 0.12% Liquid 15 milliLiter(s) Oral Mucosa every 12 hours  chlorhexidine 2% Cloths 1 Application(s) Topical daily  chlorhexidine 4% Liquid 1 Application(s) Topical <User Schedule>  dexMEDEtomidine Infusion 1.5 MICROgram(s)/kG/Hr (40.1 mL/Hr) IV Continuous <Continuous>  dexMEDEtomidine Infusion 0.2 MICROgram(s)/kG/Hr (5.35 mL/Hr) IV Continuous <Continuous>  dextrose 50% Injectable 50 milliLiter(s) IV Push every 15 minutes  dextrose 50% Injectable 25 milliLiter(s) IV Push every 15 minutes  enoxaparin Injectable 40 milliGRAM(s) SubCutaneous every 24 hours  furosemide   Injectable 40 milliGRAM(s) IV Push daily  gabapentin 100 milliGRAM(s) Oral every 8 hours  insulin lispro (ADMELOG) corrective regimen sliding scale   SubCutaneous every 6 hours  levETIRAcetam  IVPB 1000 milliGRAM(s) IV Intermittent every 12 hours  niCARdipine Infusion 5 mG/Hr (25 mL/Hr) IV Continuous <Continuous>  norepinephrine Infusion 0.05 MICROgram(s)/kG/Min (10 mL/Hr) IV Continuous <Continuous>  pantoprazole  Injectable 40 milliGRAM(s) IV Push daily  polyethylene glycol 3350 17 Gram(s) Oral daily  propofol Infusion 10 MICROgram(s)/kG/Min (6.42 mL/Hr) IV Continuous <Continuous>  senna 2 Tablet(s) Oral at bedtime  sodium chloride 0.9%. 1000 milliLiter(s) (10 mL/Hr) IV Continuous <Continuous>  sodium chloride 0.9%. 1000 milliLiter(s) (5 mL/Hr) IV Continuous <Continuous>    --------------------------------------------------------------------------------------------------  Study Interpretation:    [[[Abbreviation Key:  PDR=alpha rhythm/posterior dominant rhythm. A-P=anterior posterior gradient.  Amplitude: ‘very low’:<20; ‘low’:20-50; ‘medium’:; ‘high’:>200uV.  Persistence for periodic/rhythmic patterns (% of epoch) ‘rare’:<1%; ‘occasional’:1-10%; ‘frequent’:10-50%; ‘abundant’:50-90%; ‘continuous’:>90%.  Persistence for sporadic discharges: ‘rare’:<1/hr; ‘occasional’:1/min-1/hr; ‘frequent’:>1/min; ‘abundant’:>1/10 sec.  GRDA=generalized rhythmic delta activity; FIRDA=frontal intermittent GRDA; LRDA=lateralized rhythmic delta activity; TIRDA=temporal intermittent rhythmic delta activity;  LPD=PLED=lateralized periodic discharges; GPD=generalized periodic discharges; BiPDs=BiPLEDs=bilateral independent periodic epileptiform discharges; SIRPID=stimulus induced rhythmic, periodic, or ictal appearing discharges; BIRDs=brief potentially ictal rhythmic discharges >4 Hz, lasting .5-10s; PFA=paroxysmal bursts of beta/gamma; LVFA=low voltage fast activity.  Modifiers: +F=with fast component; +S=with spike component; +R=with rhythmic component.  S-B=burst suppression pattern.  Max=maximal. N1-drowsy; N2-stage II sleep; N3-slow wave sleep. SSS/BETS=small sharp spikes/benign epileptiform transients of sleep. HV=hyperventilation; PS=photic stimulation]]]    Daily EEG Visual Analysis    FINDINGS:      Background:  Continuity: continuous  Symmetry: none  PDR: None present  Reactivity: present  Voltage: normal, mostly 20-150uV  Anterior Posterior Gradient: present  Other background findings: none  Breach: absent    Background Slowing:  Generalized slowing: Diffuse polymorphic mixed theta-delta slowing   Focal slowing: Continuous delta slowing and absence of faster frequencies over the right hemisphere     State Changes:   -Drowsiness noted with increased slowing, attenuation of fast activity, vertex transients.  -Present with N2 sleep transients with symmetric spindles and K-complexes.      Sporadic Epileptiform Discharges:    None    Rhythmic and Periodic Patterns (RPPs):  None     Electrographic and Electroclinical seizures:  None    Other Clinical Events:  None    Activation Procedures:   -Hyperventilation was not performed.    -Photic stimulation was not performed.      Artifacts:  Intermittent myogenic and movement artifacts were noted.    ECG:  The heart rate on single channel ECG was predominantly between 60-80 BPM.    EEG Classification / Summary:   Abnormal  EEG in the awake / drowsy / asleep state(s).   1. Focal continuous delta slowing and absence of faster frequencies over the right hemisphere   2. Generalized background slowing, moderate     Clinical Impression:   1. Findings suggestive of a structural abnormality in the right hemisphere   2. Moderate diffuse cerebral dysfunction.     There were no seizures recorded.      -------------------------------------------------------------------------------------------------------  [facility] EEG Reading Room Ph#: [phone]  Epilepsy Answering Service after 5PM and before 8:30AM: Ph#: [phone]    Yazan Holbrook DO   Epilepsy Fellow     Adrien Garcia MD  Neurology Attending Physician

## 2024-11-03 NOTE — PROGRESS NOTE ADULT - SUBJECTIVE AND OBJECTIVE BOX
[facility]                                        Neurology at [place]                                 Hany Becker & Raghavendra                                  [address]                                        [place], [place], [address]                                             [phone]        CC: Aortic dissection and altered mental status. Seizure.    HPI:   The patient is a 39y Male who presented to [facility] with severe back pain radiating to legs followed by chest pain. Imaging confirmed aortic dissection and he was transferred here for surgery on 10/29/24.  He has remained on mechanical ventilator since the surgery and has been poorly responsive when sedation held.   There was a question of twitching of the extremities and neurology evaluation requested 10/31/24.    Interim history:  Remains in [address] [place] [address].  Now extubated. Following instructions.     ROS:   Denies headache or dizziness.  Denies chest pain.  Denies shortness of breath.    MEDICATIONS  (STANDING):  aMIOdarone    Tablet 400 milliGRAM(s) Oral two times a day  ascorbic acid 500 milliGRAM(s) Oral two times a day  aspirin  chewable 81 milliGRAM(s) Oral daily  bisacodyl Suppository 10 milliGRAM(s) Rectal once  chlorhexidine 2% Cloths 1 Application(s) Topical daily  chlorhexidine 4% Liquid 1 Application(s) Topical <User Schedule>  cloNIDine 0.1 milliGRAM(s) Oral three times a day  dextrose 50% Injectable 50 milliLiter(s) IV Push every 15 minutes  dextrose 50% Injectable 25 milliLiter(s) IV Push every 15 minutes  enoxaparin Injectable 40 milliGRAM(s) SubCutaneous every 24 hours  furosemide   Injectable 40 milliGRAM(s) IV Push daily  gabapentin 100 milliGRAM(s) Oral every 8 hours  levETIRAcetam  IVPB 1000 milliGRAM(s) IV Intermittent every 12 hours  methocarbamol 750 milliGRAM(s) Oral three times a day  metoprolol tartrate 50 milliGRAM(s) Oral two times a day  niCARdipine Infusion 5 mG/Hr (25 mL/Hr) IV Continuous <Continuous>  pantoprazole  Injectable 40 milliGRAM(s) IV Push daily  polyethylene glycol 3350 17 Gram(s) Oral daily  senna 2 Tablet(s) Oral at bedtime  sodium chloride 0.9%. 1000 milliLiter(s) (10 mL/Hr) IV Continuous <Continuous>  sodium chloride 0.9%. 1000 milliLiter(s) (5 mL/Hr) IV Continuous <Continuous>    Vital Signs Last 24 Hrs  T(C): 37.6 (03 Nov 2024 10:30), Max: 38.7 (02 Nov 2024 18:00)  T(F): 99.7 (03 Nov 2024 10:30), Max: 101.7 (02 Nov 2024 18:00)  HR: 79 (03 Nov 2024 10:30) (79 - 122)  BP: 144/75 (02 Nov 2024 11:15) (144/75 - 144/75)  BP(mean): 95 (02 Nov 2024 11:15) (95 - 95)  RR: 29 (03 Nov 2024 10:30) (12 - 35)  SpO2: 98% (03 Nov 2024 10:30) (85% - 99%)    Parameters below as of 03 Nov 2024 08:00  Patient On (Oxygen Delivery Method): nasal cannula w/ humidification  O2 Flow (L/min): 3    Detailed Neurologic Exam:    Mental status: The patient is awake and alert. There is no aphasia. There is mild dysarthria.     Cranial nerves: Pupils equal and react symmetrically to light. There is no visual field deficit to threat. Extraocular motion is full with no nystagmus. Facial sensation is intact. Facial musculature is symmetric. Palate elevates symmetrically. Tongue is midline.    Motor: There is normal bulk and tone.  There is no tremor.  Strength grossly 5/5 on right.  Left side moving more slowly than right. Strength 4/5 in left arm and leg. Left leg externally rotated.     Sensation: Withdraws right more rigorously than left.    Reflexes: 1 throughout and plantar responses are flexor.    Cerebellar: No dysmetria on finger nose testing.    Labs:     11-03    138  |  103  |  26.3[H]  ----------------------------<  114[H]  3.5   |  24.0  |  0.84    Ca    7.5[L]      03 Nov 2024 01:40  Mg     2.4     11-03                              9.7    16.16 )-----------( 214      ( 03 Nov 2024 01:40 )             28.9       EEG:   Abnormal  EEG in the awake / drowsy / asleep state(s).   1. Focal continuous delta slowing and absence of faster frequencies over the right hemisphere   2. Generalized background slowing, moderate   There were no seizures recorded.

## 2024-11-03 NOTE — PROGRESS NOTE ADULT - SUBJECTIVE AND OBJECTIVE BOX
RADHA PALOMARES  MRN-[id number]    HPI:  38 y/o male with no pertinent past medical history presents to  with c/o chest tightness and pain, as per  chart patient described pain as a "squeezing" sensation across chest radiating down to leg. Pt reports that the pain woke him up at around 2am last night10/29 . He reportedly went to Urgent Care thought pain was muscular so was prescribed muscle relaxer. In  ED patient Pt had CTA chest significant for Aortic dissection which is noted from the level of the root of the aorta extending into the ascending, aortic arch, descending thoracic aorta as well as extending into the abdominal aorta and is noted to end in the distal left common iliac artery. The dissection flap is in close proximity to the right coronary artery. The left renal artery is arising from the false lumen. Pt being transferred to [facility] going directly to the OR admitted under Dr. Grimm  (29 Oct 2024 14:47)      Surgery/Hospital Course:  Repair of type A aortic dissection 29-Oct-2024   Bentall Procedure with reconstruction of RCA -  Today:  No acute events-    ICU Vital Signs Last 24 Hrs  T(C): 37.9 (03 Nov 2024 11:00), Max: 38.7 (02 Nov 2024 18:00)  T(F): 100.2 (03 Nov 2024 11:00), Max: 101.7 (02 Nov 2024 18:00)  HR: 78 (03 Nov 2024 11:00) (78 - 122)  BP: --  BP(mean): --  ABP: 118/49 (03 Nov 2024 11:00) (51/-29 - 236/-29)  ABP(mean): 68 (03 Nov 2024 11:00) (68 - 234)  RR: 31 (03 Nov 2024 11:00) (12 - 35)  SpO2: 98% (03 Nov 2024 11:00) (88% - 99%)    O2 Parameters below as of 03 Nov 2024 11:00  Patient On (Oxygen Delivery Method): nasal cannula w/ humidification  O2 Flow (L/min): 3          Physical Exam:  Gen: A&O   CNS:Strength grossly 5/5 on right.         Left side moving more slowly than right. Strength 4/5 in left arm and leg. Left leg externally rotated.  -  Neck: no JVD  RES : clear , no wheezing              CVS: Regular  rhythm. Normal S1/S2  Abd: Soft, non-distended. Bowel sounds present.  Skin: No rash.  Ext:  no edema    ============================I/O===========================   I&O's Detail    02 Nov 2024 08:01  -  03 Nov 2024 07:00  --------------------------------------------------------  IN:    Dexmedetomidine: 40.2 mL    Enteral Tube Flush: 175 mL    IV PiggyBack: 50 mL    IV PiggyBack: 100 mL    NiCARdipine: 187.5 mL    Oral Fluid: 1450 mL    sodium chloride 0.9%: 260 mL    sodium chloride 0.9%: 130 mL  Total IN: 2392.7 mL    OUT:    Chest Tube (mL): 40 mL    Indwelling Catheter - Urethral (mL): 2715 mL  Total OUT: 2755 mL    Total NET: -362.3 mL      03 Nov 2024 07:01  -  03 Nov 2024 12:05  --------------------------------------------------------  IN:    NiCARdipine: 75 mL    Oral Fluid: 250 mL    sodium chloride 0.9%: 50 mL    sodium chloride 0.9%: 25 mL  Total IN: 400 mL    OUT:    Indwelling Catheter - Urethral (mL): 850 mL  Total OUT: 850 mL    Total NET: -450 mL        ============================ LABS =========================                        9.7    16.16 )-----------( 214      ( 03 Nov 2024 01:40 )             28.9     11-03    138  |  103  |  26.3[H]  ----------------------------<  114[H]  3.5   |  24.0  |  0.84    Ca    7.5[L]      03 Nov 2024 01:40  Mg     2.4     11-03          ABG - ( 03 Nov 2024 03:51 )  pH, Arterial: 7.430 pH, Blood: x     /  pCO2: 39    /  pO2: 96    / HCO3: 26    / Base Excess: 1.6   /  SaO2: 99.3              Urinalysis Basic - ( 03 Nov 2024 01:40 )    Color: x / Appearance: x / SG: x / pH: x  Gluc: 114 mg/dL / Ketone: x  / Bili: x / Urobili: x   Blood: x / Protein: x / Nitrite: x   Leuk Esterase: x / RBC: x / WBC x   Sq Epi: x / Non Sq Epi: x / Bacteria: x      ======================Micro/Rad/Cardio=================  Culture: Reviewed   CXR: Reviewed  Echo:Reviewed  ======================================================  PAST MEDICAL & SURGICAL HISTORY:  No pertinent past medical history      No pertinent past medical history      No significant past surgical history      No significant past surgical history        ====================ASSESSMENT ==============   In  ED patient Pt had CTA chest significant for Aortic dissection which is noted from the level of the root of the aorta extending into the ascending, aortic arch, descending thoracic aorta as well as extending into the abdominal aorta and is noted to end in the distal left common iliac artery. The dissection flap is in close proximity to the right coronary artery. The left renal artery is arising from the false lumen. Pt being transferred to [facility] going directly to the OR admitted under Dr. Grimm    Dissection of ascending aorta and aortic arch-  S/p Repair of type A aortic dissection 29-Oct-2024          Bentall Procedure with reconstruction of RCA  Post op Hypovolemia  Post op respiratory insufficiency   Post op Seizures    Plan:  DC Cardene  Invasive hemodynamic monitoring required for the following of MAP/SBP monitoring to ensure adequate CV support and to prevent decompensation    Beta blocker as tolerated by HR and SBP  Continue Aspirin daily  Respiratoty Status required the following of continuous pulse oximetry for support and to prevent decompensation   Chest PT and IS use with bedside nurse  Continue Diuresis with Lasix 40 mg  qd  Continue GI ppx with Protonix and Senna  DVT ppx with Lovenox and SCD boots  Monitor Neuro status  Head and Neck CT  tomorrow  Continue Keppra per Neuro  DC Radial a line    ====================== NEUROLOGY=====================  acetaminophen     Tablet .. 975 milliGRAM(s) Oral every 6 hours PRN Temp greater or equal to 38C (100.4F), Mild Pain (1 - 3), Moderate Pain (4 - 6), Severe Pain (7 - 10)  gabapentin 100 milliGRAM(s) Oral every 8 hours  levETIRAcetam  IVPB 1000 milliGRAM(s) IV Intermittent every 12 hours  methocarbamol 750 milliGRAM(s) Oral three times a day    ==================== RESPIRATORY======================  Post op respiratory insufficiency  ====================CARDIOVASCULAR==================  Post op Hypovolemia  aMIOdarone    Tablet 400 milliGRAM(s) Oral two times a day  cloNIDine 0.1 milliGRAM(s) Oral three times a day  furosemide   Injectable 40 milliGRAM(s) IV Push daily  metoprolol tartrate 50 milliGRAM(s) Oral two times a day  niCARdipine Infusion 5 mG/Hr (25 mL/Hr) IV Continuous <Continuous>    ===================HEMATOLOGIC/ONC ===================  Monitor H&H/Plts    aspirin  chewable 81 milliGRAM(s) Oral daily  enoxaparin Injectable 40 milliGRAM(s) SubCutaneous every 24 hours    ===================== RENAL =========================  Continue monitoring urine output, I&OS, BUN/Cr     ==================== GASTROINTESTINAL===================  ascorbic acid 500 milliGRAM(s) Oral two times a day  bisacodyl Suppository 10 milliGRAM(s) Rectal once  pantoprazole  Injectable 40 milliGRAM(s) IV Push daily  polyethylene glycol 3350 17 Gram(s) Oral daily  senna 2 Tablet(s) Oral at bedtime  sodium chloride 0.9% lock flush 10 milliLiter(s) IV Push every 1 hour PRN Pre/post blood products, medications, blood draw, and to maintain line patency  sodium chloride 0.9%. 1000 milliLiter(s) (10 mL/Hr) IV Continuous <Continuous>  sodium chloride 0.9%. 1000 milliLiter(s) (5 mL/Hr) IV Continuous <Continuous>    =======================    ENDOCRINE  =====================  dextrose 50% Injectable 50 milliLiter(s) IV Push every 15 minutes  dextrose 50% Injectable 25 milliLiter(s) IV Push every 15 minutes    ========================INFECTIOUS DISEASE================      -Monitor Neurologic status ,   -Head of the bed should remain elevated to 45 degrees,  -Monitor for arrhythmias and monitor parameters for organ perfusion,  -Glycemic control is satisfactory,  -Nutritional goals will be met using po eventually , insure adequate caloric intake and monitor the same ,  -Electrolytes have been repleted as necessary , pain control has been achieved  and wound care has been carried out ,  -Stress ulcer and VTE prophylaxis will be achieved,  -Agressive PT and early mobility and ambulation goals will be met,  -The family was updated about the course and plan .      I have spent 35 minutes providing acute care for this critically ill patient     Patient requires continuous monitoring with bedside rhythm monitoring, pulse ox monitoring, and intermittent blood gas analysis. Care plan discussed with ICU care team. Patient remained critical and at risk for life threatening decompensation.

## 2024-11-04 DIAGNOSIS — R56.9 UNSPECIFIED CONVULSIONS: ICD-10-CM

## 2024-11-04 DIAGNOSIS — I10 ESSENTIAL (PRIMARY) HYPERTENSION: ICD-10-CM

## 2024-11-04 LAB
ANION GAP SERPL CALC-SCNC: 12 MMOL/L — SIGNIFICANT CHANGE UP (ref 5–17)
BASOPHILS # BLD AUTO: 0.03 K/UL — SIGNIFICANT CHANGE UP (ref 0–0.2)
BASOPHILS NFR BLD AUTO: 0.2 % — SIGNIFICANT CHANGE UP (ref 0–2)
BUN SERPL-MCNC: 23.3 MG/DL — HIGH (ref 8–20)
CALCIUM SERPL-MCNC: 8.1 MG/DL — LOW (ref 8.4–10.5)
CHLORIDE SERPL-SCNC: 102 MMOL/L — SIGNIFICANT CHANGE UP (ref 96–108)
CO2 SERPL-SCNC: 24 MMOL/L — SIGNIFICANT CHANGE UP (ref 22–29)
CREAT SERPL-MCNC: 0.85 MG/DL — SIGNIFICANT CHANGE UP (ref 0.5–1.3)
EGFR: 115 ML/MIN/1.73M2 — SIGNIFICANT CHANGE UP
EOSINOPHIL # BLD AUTO: 0.51 K/UL — HIGH (ref 0–0.5)
EOSINOPHIL NFR BLD AUTO: 2.6 % — SIGNIFICANT CHANGE UP (ref 0–6)
GLUCOSE BLDC GLUCOMTR-MCNC: 112 MG/DL — HIGH (ref 70–99)
GLUCOSE SERPL-MCNC: 110 MG/DL — HIGH (ref 70–99)
HCT VFR BLD CALC: 27.6 % — LOW (ref 39–50)
HGB BLD-MCNC: 9.4 G/DL — LOW (ref 13–17)
IMM GRANULOCYTES NFR BLD AUTO: 2 % — HIGH (ref 0–0.9)
LYMPHOCYTES # BLD AUTO: 1.69 K/UL — SIGNIFICANT CHANGE UP (ref 1–3.3)
LYMPHOCYTES # BLD AUTO: 8.7 % — LOW (ref 13–44)
MAGNESIUM SERPL-MCNC: 2.3 MG/DL — SIGNIFICANT CHANGE UP (ref 1.8–2.6)
MCHC RBC-ENTMCNC: 28.7 PG — SIGNIFICANT CHANGE UP (ref 27–34)
MCHC RBC-ENTMCNC: 34.1 G/DL — SIGNIFICANT CHANGE UP (ref 32–36)
MCV RBC AUTO: 84.4 FL — SIGNIFICANT CHANGE UP (ref 80–100)
MONOCYTES # BLD AUTO: 1.93 K/UL — HIGH (ref 0–0.9)
MONOCYTES NFR BLD AUTO: 10 % — SIGNIFICANT CHANGE UP (ref 2–14)
NEUTROPHILS # BLD AUTO: 14.82 K/UL — HIGH (ref 1.8–7.4)
NEUTROPHILS NFR BLD AUTO: 76.5 % — SIGNIFICANT CHANGE UP (ref 43–77)
PLATELET # BLD AUTO: 242 K/UL — SIGNIFICANT CHANGE UP (ref 150–400)
POTASSIUM SERPL-MCNC: 3.8 MMOL/L — SIGNIFICANT CHANGE UP (ref 3.5–5.3)
POTASSIUM SERPL-SCNC: 3.8 MMOL/L — SIGNIFICANT CHANGE UP (ref 3.5–5.3)
RBC # BLD: 3.27 M/UL — LOW (ref 4.2–5.8)
RBC # FLD: 13.9 % — SIGNIFICANT CHANGE UP (ref 10.3–14.5)
SODIUM SERPL-SCNC: 138 MMOL/L — SIGNIFICANT CHANGE UP (ref 135–145)
WBC # BLD: 19.36 K/UL — HIGH (ref 3.8–10.5)
WBC # FLD AUTO: 19.36 K/UL — HIGH (ref 3.8–10.5)

## 2024-11-04 PROCEDURE — 71045 X-RAY EXAM CHEST 1 VIEW: CPT | Mod: 26

## 2024-11-04 PROCEDURE — 99232 SBSQ HOSP IP/OBS MODERATE 35: CPT | Mod: 24

## 2024-11-04 PROCEDURE — 99254 IP/OBS CNSLTJ NEW/EST MOD 60: CPT

## 2024-11-04 PROCEDURE — 99291 CRITICAL CARE FIRST HOUR: CPT

## 2024-11-04 RX ORDER — POTASSIUM CHLORIDE 10 MEQ
10 TABLET, EXTENDED RELEASE ORAL
Refills: 0 | Status: COMPLETED | OUTPATIENT
Start: 2024-11-04 | End: 2024-11-04

## 2024-11-04 RX ORDER — LEVETIRACETAM 500 MG/1
1000 TABLET, FILM COATED ORAL
Refills: 0 | Status: DISCONTINUED | OUTPATIENT
Start: 2024-11-04 | End: 2024-11-11

## 2024-11-04 RX ORDER — MAGNESIUM SULFATE IN 0.9% NACL 2 G/50 ML
2 INTRAVENOUS SOLUTION, PIGGYBACK (ML) INTRAVENOUS ONCE
Refills: 0 | Status: COMPLETED | OUTPATIENT
Start: 2024-11-04 | End: 2024-11-04

## 2024-11-04 RX ADMIN — Medication 50 MILLIEQUIVALENT(S): at 05:47

## 2024-11-04 RX ADMIN — CHLORHEXIDINE GLUCONATE 1 APPLICATION(S): 40 SOLUTION TOPICAL at 11:12

## 2024-11-04 RX ADMIN — LEVETIRACETAM 400 MILLIGRAM(S): 500 TABLET, FILM COATED ORAL at 06:32

## 2024-11-04 RX ADMIN — Medication 400 MILLIGRAM(S): at 06:28

## 2024-11-04 RX ADMIN — CHLORHEXIDINE GLUCONATE 1 APPLICATION(S): 40 SOLUTION TOPICAL at 06:32

## 2024-11-04 RX ADMIN — PANTOPRAZOLE SODIUM 40 MILLIGRAM(S): 40 TABLET, DELAYED RELEASE ORAL at 06:28

## 2024-11-04 RX ADMIN — Medication 25 GRAM(S): at 04:16

## 2024-11-04 RX ADMIN — METHOCARBAMOL 750 MILLIGRAM(S): 500 TABLET ORAL at 17:16

## 2024-11-04 RX ADMIN — Medication 2 TABLET(S): at 22:25

## 2024-11-04 RX ADMIN — Medication 40 MILLIGRAM(S): at 11:12

## 2024-11-04 RX ADMIN — Medication 40 MILLIGRAM(S): at 06:27

## 2024-11-04 RX ADMIN — METHOCARBAMOL 750 MILLIGRAM(S): 500 TABLET ORAL at 06:27

## 2024-11-04 RX ADMIN — Medication 50 MILLIGRAM(S): at 17:16

## 2024-11-04 RX ADMIN — Medication 400 MILLIGRAM(S): at 17:16

## 2024-11-04 RX ADMIN — CLONIDINE HYDROCHLORIDE 0.1 MILLIGRAM(S): 0.2 TABLET ORAL at 06:27

## 2024-11-04 RX ADMIN — Medication 50 MILLIEQUIVALENT(S): at 04:16

## 2024-11-04 RX ADMIN — Medication 81 MILLIGRAM(S): at 11:12

## 2024-11-04 RX ADMIN — CLONIDINE HYDROCHLORIDE 0.1 MILLIGRAM(S): 0.2 TABLET ORAL at 14:21

## 2024-11-04 RX ADMIN — CLONIDINE HYDROCHLORIDE 0.1 MILLIGRAM(S): 0.2 TABLET ORAL at 22:24

## 2024-11-04 RX ADMIN — Medication 50 MILLIGRAM(S): at 06:27

## 2024-11-04 RX ADMIN — Medication 50 MILLIEQUIVALENT(S): at 06:33

## 2024-11-04 RX ADMIN — Medication 975 MILLIGRAM(S): at 06:28

## 2024-11-04 RX ADMIN — METHOCARBAMOL 750 MILLIGRAM(S): 500 TABLET ORAL at 22:24

## 2024-11-04 RX ADMIN — LEVETIRACETAM 1000 MILLIGRAM(S): 500 TABLET, FILM COATED ORAL at 17:17

## 2024-11-04 RX ADMIN — Medication 975 MILLIGRAM(S): at 07:00

## 2024-11-04 NOTE — PROGRESS NOTE ADULT - COMMENTS
no acute complaints   does admit some word slowing? but with great improvement over the last two days

## 2024-11-04 NOTE — OCCUPATIONAL THERAPY INITIAL EVALUATION ADULT - RANGE OF MOTION EXAMINATION, UPPER EXTREMITY
Left UE 1/4-1/2 shoulder flexion, 1/2-3/4 elbow flexion, 1/2 wrist and 1/2-3/4 digits/Right UE Active ROM was WFL (within functional limits)

## 2024-11-04 NOTE — PROGRESS NOTE ADULT - SUBJECTIVE AND OBJECTIVE BOX
SUBJECTIVE   without acute issues     INTERIM HISTORY SIGNIFICANT FOR   without acute issues - tolerating beta blocker     Patient is a 39y old  Male who presents with a chief complaint of Type A dissection (04 Nov 2024 07:08)    HPI:  40 y/o male with no pertinent past medical history presents to  with c/o chest tightness and pain, as per  chart patient described pain as a "squeezing" sensation across chest radiating down to leg. Pt reports that the pain woke him up at around 2am last night10/29 . He reportedly went to Urgent Care thought pain was muscular so was prescribed muscle relaxer. In  ED patient Pt had CTA chest significant for Aortic dissection which is noted from the level of the root of the aorta extending into the ascending, aortic arch, descending thoracic aorta as well as extending into the abdominal aorta and is noted to end in the distal left common iliac artery. The dissection flap is in close proximity to the right coronary artery. The left renal artery is arising from the false lumen. Pt being transferred to [facility] going directly to the OR admitted under Dr. Grimm  (29 Oct 2024 14:47)    OBJECTIVE  PAST MEDICAL & SURGICAL HISTORY:  No pertinent past medical history      No pertinent past medical history      No significant past surgical history      No significant past surgical history        Allergy Status Unknown  No Known Allergies    Home Medications:    VITALS  Currently in sinus rhythm with vitals as below  ICU Vital Signs Last 24 Hrs  T(C): 37 (04 Nov 2024 07:00), Max: 38.4 (03 Nov 2024 18:00)  T(F): 98.6 (04 Nov 2024 07:00), Max: 101.1 (03 Nov 2024 18:00)  HR: 78 (04 Nov 2024 07:00) (69 - 102)  BP: 128/71 (04 Nov 2024 07:00) (128/71 - 163/90)  BP(mean): 87 (04 Nov 2024 07:00) (87 - 109)  ABP: 152/66 (04 Nov 2024 04:00) (114/52 - 210/110)  ABP(mean): 91 (04 Nov 2024 04:00) (68 - 145)  RR: 15 (04 Nov 2024 07:00) (15 - 35)  SpO2: 95% (04 Nov 2024 07:00) (89% - 100%)    O2 Parameters below as of 04 Nov 2024 07:00  Patient On (Oxygen Delivery Method): room air          Adult Advanced Hemodynamics Last 24 Hrs  CVP(mm Hg): 8 (04 Nov 2024 06:00) (3 - 22)  CVP(cm H2O): --  CO: --  CI: --  PA: --  PA(mean): --  PCWP: --  SVR: --  SVRI: --  PVR: --  PVRI: --  LABS                        9.4    19.36 )-----------( 242      ( 04 Nov 2024 02:15 )             27.6   11-04    138  |  102  |  23.3[H]  ----------------------------<  110[H]  3.8   |  24.0  |  0.85    Ca    8.1[L]      04 Nov 2024 02:15  Mg     2.3     11-04    CAPILLARY BLOOD GLUCOSE      POCT Blood Glucose.: 112 mg/dL (04 Nov 2024 07:17)      ABG - ( 03 Nov 2024 03:51 )  pH, Arterial: 7.430 pH, Blood: x     /  pCO2: 39    /  pO2: 96    / HCO3: 26    / Base Excess: 1.6   /  SaO2: 99.3                IN/OUT    11-03-24 @ 07:01  -  11-04-24 @ 07:00  --------------------------------------------------------  IN: 1420 mL / OUT: 2090 mL / NET: -670 mL      IMAGING  personally reviewed imaging     CURRENT MEDICATIONS  MEDICATIONS  (STANDING):  aMIOdarone    Tablet 400 milliGRAM(s) Oral two times a day  aspirin  chewable 81 milliGRAM(s) Oral daily  bisacodyl Suppository 10 milliGRAM(s) Rectal once  chlorhexidine 2% Cloths 1 Application(s) Topical daily  chlorhexidine 4% Liquid 1 Application(s) Topical <User Schedule>  cloNIDine 0.1 milliGRAM(s) Oral three times a day  enoxaparin Injectable 40 milliGRAM(s) SubCutaneous every 24 hours  furosemide    Tablet 40 milliGRAM(s) Oral daily  levETIRAcetam 1000 milliGRAM(s) Oral two times a day  methocarbamol 750 milliGRAM(s) Oral three times a day  metoprolol tartrate 50 milliGRAM(s) Oral two times a day  pantoprazole    Tablet 40 milliGRAM(s) Oral before breakfast  polyethylene glycol 3350 17 Gram(s) Oral daily  senna 2 Tablet(s) Oral at bedtime    MEDICATIONS  (PRN):  acetaminophen     Tablet .. 975 milliGRAM(s) Oral every 6 hours PRN Temp greater or equal to 38C (100.4F), Mild Pain (1 - 3), Moderate Pain (4 - 6), Severe Pain (7 - 10)  sodium chloride 0.9% lock flush 10 milliLiter(s) IV Push every 1 hour PRN Pre/post blood products, medications, blood draw, and to maintain line patency      Invasive Lines & Indwelling Catheters  Patient with RIJ slic; R Radial paxton, +sen, +mediastinal chest tube x 2 and left chest x 1 to suction without air leak  Pacing wires   + epicardial pacing wires VVI 40/10/0.8 Threshold 5

## 2024-11-04 NOTE — CONSULT NOTE ADULT - SUBJECTIVE AND OBJECTIVE BOX
HPI:  39yM was admitted on 10-29 3 with no pertinent past medical history presents to  with c/o chest tightness and pain, as per  chart patient described pain as a "squeezing" sensation across chest radiating down to leg. Pt reports that the pain woke him up at around 2am last night10/29 . He reportedly went to Urgent Care thought pain was muscular so was prescribed muscle relaxer. In  ED patient Pt had CTA chest significant for Aortic dissection which is noted from the level of the root of the aorta extending into the ascending, aortic arch, descending thoracic aorta as well as extending into the abdominal aorta and is noted to end in the distal left common iliac artery. The dissection flap is in close proximity to the right coronary artery. The left renal artery is arising from the false lumen. [facility]red [facility] going directly to the OR admitted under Dr. Grimm, S/p Repair of type A aortic dissection 29-Oct-2024.     Imaging Reviewed Today:  CT ANGIO ABD PELV (W)AW IC & CT ANGIO CHEST AORTA WAWI  PROCEDURE DATE:  10/29/2024   IMPRESSION: Aortic dissection beginning at the root of the aorta   extending into the ascending, aortic arch, descending thoracic aorta as   well as the abdominal aorta and ends in the distal left common iliac   artery.     CT ANGIO NECK (W)AW IC & CT ANGIO BRAIN (W)AW IC   ORDERED BY: JAG MAX   PROCEDURE DATE:  10/31/2024  CT brain:No hydrocephalus, acute intracranial hemorrhage, mass effect, or brain   edema.  CTA brain:No flow-limiting stenosis or vascular aneurysm. No AVM.  Venous system is well opacified, no evidence for venoussinus or cortical   vein thrombosis.  CTA neck:Status post repair of the proximal aspect of the previously seen left   aortic dissection. New edematous changes within the mediastinum. New   bibasilar atelectasis.  Residual false lumen supplies the left common carotid and subclavian   arteries.  No flow-limiting stenosis or vascular aneurysm.    ----------------------------------------------------------------------------------------  CHIEF COMPLAINT          ----------------------------------------------------------------------------------------  VITALS  T(C): 38.1 (11-04-24 @ 06:00), Max: 38.4 (11-03-24 @ 18:00)  HR: 88 (11-04-24 @ 06:00) (69 - 102)  BP: 163/90 (11-04-24 @ 06:00) (143/69 - 163/90)  RR: 21 (11-04-24 @ 06:00) (16 - 35)  SpO2: 94% (11-04-24 @ 06:00) (89% - 100%)  Wt(kg): --    PAST MEDICAL & SURGICAL HISTORY  No pertinent past medical history    No pertinent past medical history    No significant past surgical history    No significant past surgical history          LABS  REVIEWED    CBC Full  -  ( 04 Nov 2024 02:15 )  WBC Count : 19.36 K/uL  RBC Count : 3.27 M/uL  Hemoglobin : 9.4 g/dL  Hematocrit : 27.6 %  Platelet Count - Automated : 242 K/uL  Mean Cell Volume : 84.4 fl  Mean Cell Hemoglobin : 28.7 pg  Mean Cell Hemoglobin Concentration : 34.1 g/dL  Auto Neutrophil # : 14.82 K/uL  Auto Lymphocyte # : 1.69 K/uL  Auto Monocyte # : 1.93 K/uL  Auto Eosinophil # : 0.51 K/uL  Auto Basophil # : 0.03 K/uL  Auto Neutrophil % : 76.5 %  Auto Lymphocyte % : 8.7 %  Auto Monocyte % : 10.0 %  Auto Eosinophil % : 2.6 %  Auto Basophil % : 0.2 %    11-04    138  |  102  |  23.3[H]  ----------------------------<  110[H]  3.8   |  24.0  |  0.85    Ca    8.1[L]      04 Nov 2024 02:15  Mg     2.3     11-04      Urinalysis Basic - ( 04 Nov 2024 02:15 )    Color: x / Appearance: x / SG: x / pH: x  Gluc: 110 mg/dL / Ketone: x  / Bili: x / Urobili: x   Blood: x / Protein: x / Nitrite: x   Leuk Esterase: x / RBC: x / WBC x   Sq Epi: x / Non Sq Epi: x / Bacteria: x        ALLERGIES  Allergy Status Unknown  No Known Allergies      MEDICATIONS   acetaminophen     Tablet .. 975 milliGRAM(s) Oral every 6 hours PRN  aMIOdarone    Tablet 400 milliGRAM(s) Oral two times a day  aspirin  chewable 81 milliGRAM(s) Oral daily  bisacodyl Suppository 10 milliGRAM(s) Rectal once  chlorhexidine 2% Cloths 1 Application(s) Topical daily  chlorhexidine 4% Liquid 1 Application(s) Topical <User Schedule>  cloNIDine 0.1 milliGRAM(s) Oral three times a day  enoxaparin Injectable 40 milliGRAM(s) SubCutaneous every 24 hours  furosemide    Tablet 40 milliGRAM(s) Oral daily  levETIRAcetam 1000 milliGRAM(s) Oral two times a day  methocarbamol 750 milliGRAM(s) Oral three times a day  metoprolol tartrate 50 milliGRAM(s) Oral two times a day  pantoprazole    Tablet 40 milliGRAM(s) Oral before breakfast  polyethylene glycol 3350 17 Gram(s) Oral daily  senna 2 Tablet(s) Oral at bedtime  sodium chloride 0.9% lock flush 10 milliLiter(s) IV Push every 1 hour PRN        ----------------------------------------------------------------------------------------  FUNCTIONAL HISTORY  Lives   Independent    CURRENT FUNCTIONAL STATUS      ----------------------------------------------------------------------------------------  PHYSICAL EXAM  Constitutional - NAD, Comfortable  HEENT - NCAT, EOMI  Neck - Supple, No limited ROM  Chest - Breathing comfortably, No wheezing  Cardiovascular - S1S2   Abdomen - Soft   Extremities - No C/C/E, No calf tenderness   Neurologic Exam -                    Cognitive - AAO to self, place, date, year, situation     Communication - Fluent, No dysarthria     Cranial Nerves - CN 2-12 intact     FUNCTIONAL MOTOR EXAM - No focal deficits                    LEFT    UE - ShAB 5/5, EF 5/5, EE 5/5, WE 5/5,  5/5                    RIGHT UE - ShAB 5/5, EF 5/5, EE 5/5, WE 5/5,  5/5                    LEFT    LE - HF 5/5, KE 5/5, DF 5/5, PF 5/5                    RIGHT LE - HF 5/5, KE 5/5, DF 5/5, PF 5/5        Sensory - Intact to LT     Reflexes - DTR Intact, No primitive reflexive     Coordination - FTN intact     OculoVestibular - No saccades, No nystagmus, VOR         Balance - WNL Static  Psychiatric - Mood stable, Affect WNL  ----------------------------------------------------------------------------------------  ASSESSMENT/PLAN  39yMale with functional deficits after  Pain - Tylenol  DVT PPX - SCDs  Impaired Mobility/Function/Rehab Recommendations -  HPI:  39yM right handed was admitted on 10-29 3 with no pertinent past medical history presents to  with c/o chest tightness and pain, as per  chart patient described pain as a "squeezing" sensation across chest radiating down to leg. Pt reports that the pain woke him up at around 2am last night10/29 . He reportedly went to Urgent Care thought pain was muscular so was prescribed muscle relaxer. In  ED patient Pt had CTA chest significant for Aortic dissection which is noted from the level of the root of the aorta extending into the ascending, aortic arch, descending thoracic aorta as well as extending into the abdominal aorta and is noted to end in the distal left common iliac artery. The dissection flap is in close proximity to the right coronary artery. The left renal artery is arising from the false lumen. Pt transferred to [facility] going directly to the OR admitted under Dr. Grimm, S/p Repair of type A aortic dissection 29-Oct-2024.     Imaging Reviewed Today:  CT ANGIO ABD PELV (W)AW IC & CT ANGIO CHEST AORTA WAWI  PROCEDURE DATE:  10/29/2024   IMPRESSION: Aortic dissection beginning at the root of the aorta   extending into the ascending, aortic arch, descending thoracic aorta as   well as the abdominal aorta and ends in the distal left common iliac   artery.     CT ANGIO NECK (W)AW IC & CT ANGIO BRAIN (W)AW IC   ORDERED BY: JAG MAX   PROCEDURE DATE:  10/31/2024  CT brain:No hydrocephalus, acute intracranial hemorrhage, mass effect, or brain   edema.  CTA brain:No flow-limiting stenosis or vascular aneurysm. No AVM.  Venous system is well opacified, no evidence for venoussinus or cortical   vein thrombosis.  CTA neck:Status post repair of the proximal aspect of the previously seen left   aortic dissection. New edematous changes within the mediastinum. New   bibasilar atelectasis.  Residual false lumen supplies the left common carotid and subclavian   arteries.  No flow-limiting stenosis or vascular aneurysm.    ----------------------------------------------------------------------------------------  CHIEF COMPLAINT - "feeling off"    Patient seen and examined at bedside this morning for consult. He was lani to recount the nature of his hospitalization and some aspects of hospital course thus far. Denies any specific complaints at this time but note that ever since his operation he "just feels off" but could not further elucidate the feeling.     ----------------------------------------------------------------------------------------  VITALS  T(C): 38.1 (11-04-24 @ 06:00), Max: 38.4 (11-03-24 @ 18:00)  HR: 88 (11-04-24 @ 06:00) (69 - 102)  BP: 163/90 (11-04-24 @ 06:00) (143/69 - 163/90)  RR: 21 (11-04-24 @ 06:00) (16 - 35)  SpO2: 94% (11-04-24 @ 06:00) (89% - 100%)  Wt(kg): --    PAST MEDICAL & SURGICAL HISTORY  No pertinent past medical history    No pertinent past medical history    No significant past surgical history    No significant past surgical history          LABS  REVIEWED    CBC Full  -  ( 04 Nov 2024 02:15 )  WBC Count : 19.36 K/uL  RBC Count : 3.27 M/uL  Hemoglobin : 9.4 g/dL  Hematocrit : 27.6 %  Platelet Count - Automated : 242 K/uL  Mean Cell Volume : 84.4 fl  Mean Cell Hemoglobin : 28.7 pg  Mean Cell Hemoglobin Concentration : 34.1 g/dL  Auto Neutrophil # : 14.82 K/uL  Auto Lymphocyte # : 1.69 K/uL  Auto Monocyte # : 1.93 K/uL  Auto Eosinophil # : 0.51 K/uL  Auto Basophil # : 0.03 K/uL  Auto Neutrophil % : 76.5 %  Auto Lymphocyte % : 8.7 %  Auto Monocyte % : 10.0 %  Auto Eosinophil % : 2.6 %  Auto Basophil % : 0.2 %    11-04    138  |  102  |  23.3[H]  ----------------------------<  110[H]  3.8   |  24.0  |  0.85    Ca    8.1[L]      04 Nov 2024 02:15  Mg     2.3     11-04      Urinalysis Basic - ( 04 Nov 2024 02:15 )    Color: x / Appearance: x / SG: x / pH: x  Gluc: 110 mg/dL / Ketone: x  / Bili: x / Urobili: x   Blood: x / Protein: x / Nitrite: x   Leuk Esterase: x / RBC: x / WBC x   Sq Epi: x / Non Sq Epi: x / Bacteria: x        ALLERGIES  Allergy Status Unknown  No Known Allergies      MEDICATIONS   acetaminophen     Tablet .. 975 milliGRAM(s) Oral every 6 hours PRN  aMIOdarone    Tablet 400 milliGRAM(s) Oral two times a day  aspirin  chewable 81 milliGRAM(s) Oral daily  bisacodyl Suppository 10 milliGRAM(s) Rectal once  chlorhexidine 2% Cloths 1 Application(s) Topical daily  chlorhexidine 4% Liquid 1 Application(s) Topical <User Schedule>  cloNIDine 0.1 milliGRAM(s) Oral three times a day  enoxaparin Injectable 40 milliGRAM(s) SubCutaneous every 24 hours  furosemide    Tablet 40 milliGRAM(s) Oral daily  levETIRAcetam 1000 milliGRAM(s) Oral two times a day  methocarbamol 750 milliGRAM(s) Oral three times a day  metoprolol tartrate 50 milliGRAM(s) Oral two times a day  pantoprazole    Tablet 40 milliGRAM(s) Oral before breakfast  polyethylene glycol 3350 17 Gram(s) Oral daily  senna 2 Tablet(s) Oral at bedtime  sodium chloride 0.9% lock flush 10 milliLiter(s) IV Push every 1 hour PRN        ----------------------------------------------------------------------------------------  FUNCTIONAL HISTORY  Lives in a single family home with his wife no children or pets in the house. Two story house with a basement, 2 ABBY with rails. First floor has a kitchen, bathroom and living space; second floor contains bedroom and bathroom.  He is right handed. 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    Independent at baseline of all ADLS and IADLS    CURRENT FUNCTIONAL STATUS  Max assist for bed mobility  Transfers and gait not tested dur to safety concerns      ----------------------------------------------------------------------------------------  PHYSICAL EXAM  Constitutional - NAD, Comfortable  HEENT - NCAT, EOMI  Neck - Supple, No limited ROM  Chest - Breathing comfortably, No wheezing  Cardiovascular - S1S2   Abdomen - Soft   Extremities - No C/C/E, No calf tenderness   Neurologic Exam -                    Cognitive - AAO to self, place, date, year, situation     Communication - Mild dysarthria with difficulty initiating speech      Cranial Nerves - EOMI, PERRLA, Difficulty following instructions for confrontation, Facial sensation intact, Facial Motor intact, Hearing intact b/l, Shoulder shrug diminished on L , Hypoglossal intact     FUNCTIONAL MOTOR EXAM -                     LEFT    UE - ShAB 3/5, EF 4/5, EE 3/5, WE 4/5,  4/5                    RIGHT UE - ShAB 4/5, EF 4/5, EE 4/5, WE 5/5,  5/5                    LEFT    LE - HF 3/5, KE 4/5, DF 3/5, PF 4/5                    RIGHT LE - HF 4/5, KE 4/5, DF 5/5, PF 5/5        Sensory - Intact to LT     Reflexes - DTR Intact, No primitive reflexive     Coordination - FTN intact on R and impaired on L     OculoVestibular - No saccades, No nystagmus, VOR         Balance - WNL Static  Psychiatric - Mood stable, Affect WNL  ----------------------------------------------------------------------------------------  ASSESSMENT/PLAN  39y right handed Male with functional deficits after Aortic dissection s/p Bentall procedure with reconstruction of the RCA on 10/29. H/C complicated by hypovolemia respiratory insufficieny and seizure.  Now with motor and cognitive functional deficits.     #Seizure  #Motor and cognitive Functional Deficits  - Agree with seizure management as per neurology  - Obtain OT and SLP evaluation  - Continue Physical therapy  - Patient would be a good candidate for acute inpatient rehabilitation when medically stable    #Aortic dissection s/p repair  - Continue management by CT surgery    #DVT PPX - SCDs and lovenox

## 2024-11-04 NOTE — PROGRESS NOTE ADULT - SUBJECTIVE AND OBJECTIVE BOX
CRITICAL CARE ATTENDING - CTICU    MEDICATIONS  (STANDING):  aMIOdarone    Tablet 400 milliGRAM(s) Oral two times a day  aspirin  chewable 81 milliGRAM(s) Oral daily  bisacodyl Suppository 10 milliGRAM(s) Rectal once  chlorhexidine 2% Cloths 1 Application(s) Topical daily  chlorhexidine 4% Liquid 1 Application(s) Topical <User Schedule>  cloNIDine 0.1 milliGRAM(s) Oral three times a day  enoxaparin Injectable 40 milliGRAM(s) SubCutaneous every 24 hours  furosemide    Tablet 40 milliGRAM(s) Oral daily  levETIRAcetam 1000 milliGRAM(s) Oral two times a day  methocarbamol 750 milliGRAM(s) Oral three times a day  metoprolol tartrate 50 milliGRAM(s) Oral two times a day  pantoprazole    Tablet 40 milliGRAM(s) Oral before breakfast  polyethylene glycol 3350 17 Gram(s) Oral daily  senna 2 Tablet(s) Oral at bedtime                                    9.4    19.36 )-----------( 242      ( 04 Nov 2024 02:15 )             27.6       11-04    138  |  102  |  23.3[H]  ----------------------------<  110[H]  3.8   |  24.0  |  0.85    Ca    8.1[L]      04 Nov 2024 02:15  Mg     2.3     11-04                Daily     Daily       11-03 @ 07:01  -  11-04 @ 07:00  --------------------------------------------------------  IN: 1420 mL / OUT: 2090 mL / NET: -670 mL        Critically Ill patient  : [ ] preoperative ,   [x ] post operative    Requires :  [ ] Arterial Line   [ ] Central Line  [ ] PA catheter  [ ] IABP  [ ] ECMO  [ ] LVAD  [ x] Ventilator - Now Extubated   [ x] pacemaker - TPM  [ ] Impella.                      [x ] ABG's     [ x] Pulse Oxymetry Monitoring  Bedside evaluation , monitoring , treatment of hemodynamics , fluids , IVP/ IVCD meds.        Diagnosis:     Admitted Type A Aortic Dissection     Type A Aortic Dissection Repair / RCA Reconsruction     Hypertension     Hemodynamic lability,  instability. Requires IVCD [ ] vasopressors [ ] inotropes  [x ] vasodilator / beta blocker [ ]IVSS fluid  to maintain MAP, perfusion, C.I.     Encephalopathy - resolved     Seizures - on Kera     EEG     CTA Head and Neck - No pathology  noted    Prerenal Azotemia     Requires bedside physical therapy, mobilization and total [facility] care.     L -  hemiparesis ? R CVA ?    Respiratory insuffiencey     Remains extubated 2 days     Hypoxemia - Requires  NC    Supplemental O2    Requires chest PT, pulmonary toilet,  suctioning to maintain SaO2,  patent airway and treat atelectasis.     Fluid  overload                             Discussed with CT surgeon, Physician's Assistant - Nurse Practitioner- Critical care medicine team.   Discussed at  AM / PM rounds.   Chart, labs , films reviewed.    Cumulative Critical Care Time Given Today : 60 min

## 2024-11-04 NOTE — OCCUPATIONAL THERAPY INITIAL EVALUATION ADULT - PHYSICAL ASSIST/NONPHYSICAL ASSIST: EATING, OT EVAL
phone used  Reviewed reassuring US findings and positive antibody screen; glucola and repeat testing today  Tdap     Placenta previa US follow up:  LIMITED OB SCAN  A SINGLE VERTEX 30W1D IUP IS SEEN. FETAL CARDIAC MOTION OBSERVED. LIMITED ANATOMY WAS VISUALIZED AND APPEARS WNL. EFW= 3LB 6OZ ( 52%)  MARY= 10.5CM  PLACENTA APPEARS HETEROGENEOUS THROUGHOUT. THE PLACENTA APPEARS TO BE AWAY FROM THE  INTERNAL CERVICAL OS. 1 person assist

## 2024-11-04 NOTE — OCCUPATIONAL THERAPY INITIAL EVALUATION ADULT - BED MOBILITY/TRANSFERS, PREVIOUS LEVEL OF FUNCTION, OT EVAL
Bill For Surgical Tray: no Billing Type: Third-Party Bill Performing Laboratory: [id number] Expected Date Of Service: 08/09/2021 independent

## 2024-11-04 NOTE — OCCUPATIONAL THERAPY INITIAL EVALUATION ADULT - PERTINENT HX OF CURRENT PROBLEM, REHAB EVAL
Pt was transferred from [facility] via [facility] after going to urgent care first; dissection flap in close proximity to right coronary artery

## 2024-11-04 NOTE — CONSULT NOTE ADULT - ATTENDING COMMENTS
Patient seen and examined. Case discussed with Dr. Reagan and agree with recommendations outlined above. I have personally reviewed the imaging and labs listed above.    Rehab/Impaired mobility and function -  Patient continues to require hospitalization for the above diagnoses and ongoing active management of comorbid complications  that are substantially posing a threat to bodily function, functional ability and quality of life.    RECOMMEND - OOB daily, Turn Q2 when needed, HOB >30 degrees    When medically optimized, based on the patient's diagnosis, current functional status and potential for progress, recommend ACUTE inpatient rehabilitation for the functional deficits consisting of 3 hours of therapy/day & 24 hour RN/daily PMR physician for comorbid medical management. Patient will be able to tolerate 3 hours a day.      Will continue to follow. Rehab recommendations are dependent on how functional progress changes as well as how patient continues to participate and tolerate therapeutic interventions, which may change. Recommend ongoing mobilization by staff to maintain cardiopulmonary function and prevention of secondary complications related to debility. Discussed the specific rehabilitation management and recommendations above with rehab clinical care team/rehab liaison.      Total Time Spent on Encounter (reviewing clinical notes, labs, radiology and medications, reviewing patient history, discussing with resident, pre-rounding, physical exam, assessment and discussing rehabilitation options - with consideration of prior level of function, expected level of recovery and return to community living, rounding with team) - 75 minutes

## 2024-11-05 LAB
ANION GAP SERPL CALC-SCNC: 12 MMOL/L — SIGNIFICANT CHANGE UP (ref 5–17)
APPEARANCE UR: CLEAR — SIGNIFICANT CHANGE UP
BACTERIA # UR AUTO: NEGATIVE /HPF — SIGNIFICANT CHANGE UP
BASOPHILS # BLD AUTO: 0.08 K/UL — SIGNIFICANT CHANGE UP (ref 0–0.2)
BASOPHILS NFR BLD AUTO: 0.4 % — SIGNIFICANT CHANGE UP (ref 0–2)
BILIRUB UR-MCNC: NEGATIVE — SIGNIFICANT CHANGE UP
BUN SERPL-MCNC: 17.5 MG/DL — SIGNIFICANT CHANGE UP (ref 8–20)
CALCIUM SERPL-MCNC: 7.9 MG/DL — LOW (ref 8.4–10.5)
CAST: 0 /LPF — SIGNIFICANT CHANGE UP (ref 0–4)
CHLORIDE SERPL-SCNC: 101 MMOL/L — SIGNIFICANT CHANGE UP (ref 96–108)
CO2 SERPL-SCNC: 26 MMOL/L — SIGNIFICANT CHANGE UP (ref 22–29)
COLOR SPEC: YELLOW — SIGNIFICANT CHANGE UP
CREAT SERPL-MCNC: 0.79 MG/DL — SIGNIFICANT CHANGE UP (ref 0.5–1.3)
DIFF PNL FLD: NEGATIVE — SIGNIFICANT CHANGE UP
EGFR: 117 ML/MIN/1.73M2 — SIGNIFICANT CHANGE UP
EOSINOPHIL # BLD AUTO: 0.5 K/UL — SIGNIFICANT CHANGE UP (ref 0–0.5)
EOSINOPHIL NFR BLD AUTO: 2.4 % — SIGNIFICANT CHANGE UP (ref 0–6)
GLUCOSE SERPL-MCNC: 104 MG/DL — HIGH (ref 70–99)
GLUCOSE UR QL: NEGATIVE MG/DL — SIGNIFICANT CHANGE UP
HCT VFR BLD CALC: 32.1 % — LOW (ref 39–50)
HGB BLD-MCNC: 10.7 G/DL — LOW (ref 13–17)
IMM GRANULOCYTES NFR BLD AUTO: 4.6 % — HIGH (ref 0–0.9)
KETONES UR-MCNC: NEGATIVE MG/DL — SIGNIFICANT CHANGE UP
LEUKOCYTE ESTERASE UR-ACNC: NEGATIVE — SIGNIFICANT CHANGE UP
LYMPHOCYTES # BLD AUTO: 1.68 K/UL — SIGNIFICANT CHANGE UP (ref 1–3.3)
LYMPHOCYTES # BLD AUTO: 8 % — LOW (ref 13–44)
MAGNESIUM SERPL-MCNC: 2.2 MG/DL — SIGNIFICANT CHANGE UP (ref 1.6–2.6)
MCHC RBC-ENTMCNC: 28.5 PG — SIGNIFICANT CHANGE UP (ref 27–34)
MCHC RBC-ENTMCNC: 33.3 G/DL — SIGNIFICANT CHANGE UP (ref 32–36)
MCV RBC AUTO: 85.6 FL — SIGNIFICANT CHANGE UP (ref 80–100)
MONOCYTES # BLD AUTO: 2.21 K/UL — HIGH (ref 0–0.9)
MONOCYTES NFR BLD AUTO: 10.5 % — SIGNIFICANT CHANGE UP (ref 2–14)
NEUTROPHILS # BLD AUTO: 15.59 K/UL — HIGH (ref 1.8–7.4)
NEUTROPHILS NFR BLD AUTO: 74.1 % — SIGNIFICANT CHANGE UP (ref 43–77)
NITRITE UR-MCNC: NEGATIVE — SIGNIFICANT CHANGE UP
PH UR: 6 — SIGNIFICANT CHANGE UP (ref 5–8)
PLATELET # BLD AUTO: 356 K/UL — SIGNIFICANT CHANGE UP (ref 150–400)
POTASSIUM SERPL-MCNC: 3.6 MMOL/L — SIGNIFICANT CHANGE UP (ref 3.5–5.3)
POTASSIUM SERPL-SCNC: 3.6 MMOL/L — SIGNIFICANT CHANGE UP (ref 3.5–5.3)
PROT UR-MCNC: SIGNIFICANT CHANGE UP MG/DL
RBC # BLD: 3.75 M/UL — LOW (ref 4.2–5.8)
RBC # FLD: 14 % — SIGNIFICANT CHANGE UP (ref 10.3–14.5)
RBC CASTS # UR COMP ASSIST: 0 /HPF — SIGNIFICANT CHANGE UP (ref 0–4)
SODIUM SERPL-SCNC: 139 MMOL/L — SIGNIFICANT CHANGE UP (ref 135–145)
SP GR SPEC: 1.02 — SIGNIFICANT CHANGE UP (ref 1–1.03)
SQUAMOUS # UR AUTO: 0 /HPF — SIGNIFICANT CHANGE UP (ref 0–5)
UROBILINOGEN FLD QL: 1 MG/DL — SIGNIFICANT CHANGE UP (ref 0.2–1)
WBC # BLD: 21.02 K/UL — HIGH (ref 3.8–10.5)
WBC # FLD AUTO: 21.02 K/UL — HIGH (ref 3.8–10.5)
WBC UR QL: 1 /HPF — SIGNIFICANT CHANGE UP (ref 0–5)

## 2024-11-05 PROCEDURE — 99233 SBSQ HOSP IP/OBS HIGH 50: CPT | Mod: GC

## 2024-11-05 PROCEDURE — 71045 X-RAY EXAM CHEST 1 VIEW: CPT | Mod: 26

## 2024-11-05 PROCEDURE — 99232 SBSQ HOSP IP/OBS MODERATE 35: CPT

## 2024-11-05 PROCEDURE — 99024 POSTOP FOLLOW-UP VISIT: CPT

## 2024-11-05 PROCEDURE — 93010 ELECTROCARDIOGRAM REPORT: CPT

## 2024-11-05 RX ORDER — POTASSIUM CHLORIDE 10 MEQ
40 TABLET, EXTENDED RELEASE ORAL ONCE
Refills: 0 | Status: COMPLETED | OUTPATIENT
Start: 2024-11-05 | End: 2024-11-05

## 2024-11-05 RX ADMIN — METHOCARBAMOL 750 MILLIGRAM(S): 500 TABLET ORAL at 22:22

## 2024-11-05 RX ADMIN — Medication 40 MILLIGRAM(S): at 05:35

## 2024-11-05 RX ADMIN — LEVETIRACETAM 1000 MILLIGRAM(S): 500 TABLET, FILM COATED ORAL at 05:35

## 2024-11-05 RX ADMIN — Medication 40 MILLIEQUIVALENT(S): at 08:59

## 2024-11-05 RX ADMIN — CLONIDINE HYDROCHLORIDE 0.1 MILLIGRAM(S): 0.2 TABLET ORAL at 13:50

## 2024-11-05 RX ADMIN — CLONIDINE HYDROCHLORIDE 0.1 MILLIGRAM(S): 0.2 TABLET ORAL at 05:34

## 2024-11-05 RX ADMIN — CHLORHEXIDINE GLUCONATE 1 APPLICATION(S): 40 SOLUTION TOPICAL at 13:50

## 2024-11-05 RX ADMIN — PANTOPRAZOLE SODIUM 40 MILLIGRAM(S): 40 TABLET, DELAYED RELEASE ORAL at 05:34

## 2024-11-05 RX ADMIN — METHOCARBAMOL 750 MILLIGRAM(S): 500 TABLET ORAL at 05:34

## 2024-11-05 RX ADMIN — CLONIDINE HYDROCHLORIDE 0.1 MILLIGRAM(S): 0.2 TABLET ORAL at 22:22

## 2024-11-05 RX ADMIN — Medication 81 MILLIGRAM(S): at 13:50

## 2024-11-05 RX ADMIN — METHOCARBAMOL 750 MILLIGRAM(S): 500 TABLET ORAL at 13:50

## 2024-11-05 RX ADMIN — Medication 40 MILLIGRAM(S): at 22:22

## 2024-11-05 RX ADMIN — LEVETIRACETAM 1000 MILLIGRAM(S): 500 TABLET, FILM COATED ORAL at 17:09

## 2024-11-05 RX ADMIN — Medication 50 MILLIGRAM(S): at 17:10

## 2024-11-05 RX ADMIN — Medication 50 MILLIGRAM(S): at 05:34

## 2024-11-05 NOTE — PROGRESS NOTE ADULT - SUBJECTIVE AND OBJECTIVE BOX
BRIEF HOSPITAL COURSE  40 y/o male with no pertinent past medical history presents to  with c/o chest tightness and pain, as per  chart patient described pain as a "squeezing" sensation across chest radiating down to leg originally was prescribed muscle relaxant in UC presented to  ED patient Pt had CTA chest significant for Aortic dissection which is noted from the level of the root of the aorta extending into the ascending, aortic arch, descending thoracic aorta as well as extending into the abdominal aorta and is noted to end in the distal left common iliac artery. The dissection flap is in close proximity to the right coronary artery. The left renal artery is arising from the false lumen. Transferred to [facility] direct to OR for biobental with reconstruction of the RCA. Post op with hypertension and seizure (treated with IV ativan, propofol, keppra loaded). CT head/neck no acute findings. Neurology following. s/p cEEG without acute events. Overall, recovering well. Remains with left sided weakness. IV antihypertensives adjusted to PO regimen with appropriate BP limits. Dispo pending for acute rehab.     SIGNIFICANT RECENT/PAST 24 HR EVENTS  No acute events reported overnight.     SUBJECTIVE  Patient seen and examined on follow up. Pt currently lying in bed in NAD. Denies fevers, chills, HA, dizziness, CP, SOB, abd pain, N/V/D, numbness/tingling in extremities, or any other acute complaints. States pain well controlled on current regimen.   +Tolerating diet  +Passing BMs since surgery   +Passing gas  +Ambulating during day  +Using incentive as instructed  ROS negative x 10 systems except as noted above.    PAST MEDICAL & SURGICAL HISTORY  No pertinent past medical history  No significant past surgical history    DAILY REVIEW  Telemetry: Sinus rhythm   Vital Signs Last 24 Hrs  T(C): 36.8 (05 Nov 2024 01:16), Max: 38.1 (04 Nov 2024 05:00)  T(F): 98.2 (05 Nov 2024 01:16), Max: 100.6 (04 Nov 2024 05:00)  HR: 78 (05 Nov 2024 01:16) (73 - 88)  BP: 163/71 (05 Nov 2024 01:16) (109/55 - 163/90)  BP(mean): 97 (04 Nov 2024 22:21) (72 - 109)  RR: 19 (05 Nov 2024 01:16) (15 - 29)  SpO2: 93% (05 Nov 2024 01:16) (93% - 98%)    Parameters below as of 05 Nov 2024 01:16  Patient On (Oxygen Delivery Method): room air    I&O's Detail    03 Nov 2024 07:01  -  04 Nov 2024 07:00  --------------------------------------------------------  IN:    IV PiggyBack: 50 mL    IV PiggyBack: 100 mL    IV PiggyBack: 150 mL    NiCARdipine: 75 mL    Oral Fluid: 730 mL    sodium chloride 0.9%: 105 mL    sodium chloride 0.9%: 210 mL  Total IN: 1420 mL    OUT:    Indwelling Catheter - Urethral (mL): 2090 mL  Total OUT: 2090 mL    Total NET: -670 mL    04 Nov 2024 07:01  -  05 Nov 2024 04:05  --------------------------------------------------------  IN:    Oral Fluid: 640 mL  Total IN: 640 mL    OUT:    Voided (mL): 1200 mL  Total OUT: 1200 mL    Total NET: -560 mL    Last Bowel Movement: 03-Nov-2024 (11-05-24 @ 01:30)  Last Bowel Movement: 03-Nov-2024 (11-04-24 @ 15:00)  Last Bowel Movement: 03-Nov-2024 (11-03-24 @ 08:00)  Last Bowel Movement: 03-Nov-2024 (11-03-24 @ 00:00)  Last Bowel Movement: 02-Nov-2024 (11-02-24 @ 12:00)    Admit Wt: Drug Dosing Weight  Height (cm): 170.2 (29 Oct 2024 23:45)  Weight (kg): 107 (29 Oct 2024 23:45)  BMI (kg/m2): 36.9 (29 Oct 2024 23:45)  BSA (m2): 2.17 (29 Oct 2024 23:45)    LABS                        9.4    19.36 )-----------( 242      ( 04 Nov 2024 02:15 )             27.6     11-04    138  |  102  |  23.3[H]  ----------------------------<  110[H]  3.8   |  24.0  |  0.85    Ca    8.1[L]      04 Nov 2024 02:15  Mg     2.3     11-04    POCT Blood Glucose.: 112 mg/dL (11-04-24 @ 07:17)    MEDICATIONS  Home Medications:    MEDICATIONS  (STANDING):  aspirin  chewable 81 milliGRAM(s) Oral daily  bisacodyl Suppository 10 milliGRAM(s) Rectal once  chlorhexidine 2% Cloths 1 Application(s) Topical daily  cloNIDine 0.1 milliGRAM(s) Oral three times a day  enoxaparin Injectable 40 milliGRAM(s) SubCutaneous every 24 hours  furosemide    Tablet 40 milliGRAM(s) Oral daily  levETIRAcetam 1000 milliGRAM(s) Oral two times a day  methocarbamol 750 milliGRAM(s) Oral three times a day  metoprolol tartrate 50 milliGRAM(s) Oral two times a day  pantoprazole    Tablet 40 milliGRAM(s) Oral before breakfast  polyethylene glycol 3350 17 Gram(s) Oral daily  senna 2 Tablet(s) Oral at bedtime    MEDICATIONS  (PRN):  acetaminophen     Tablet .. 975 milliGRAM(s) Oral every 6 hours PRN Temp greater or equal to 38C (100.4F), Mild Pain (1 - 3), Moderate Pain (4 - 6), Severe Pain (7 - 10)  sodium chloride 0.9% lock flush 10 milliLiter(s) IV Push every 1 hour PRN Pre/post blood products, medications, blood draw, and to maintain line patency    ALLERGIES  Allergy Status Unknown  No Known Allergies    DIAGNOSTICS  All relevant and available laboratory results, radiology and medications reviewed.    PHYSICAL EXAM  Constitutional: NAD  Neck: supple, trachea midline. No JVD   Respiratory: Breath sounds diminished b/l lower fields to auscultation, no accessory muscle use noted. No wheezing, rales, or rhonchi noted b/l   Cardiovascular: Regular rate, regular rhythm, normal S1, S2; no murmurs or rub   Gastrointestinal: Soft, non-tender, non-distended, + bowel sounds   Extremities: GOSS x 4, 2+ peripheral edema, no cyanosis, no clubbing    Vascular: Equal and normal pulses: 2+ peripheral pulses throughout  Neurological: A+O x 3; speech clear and intact; no gross sensory deficits; mild LUE/LLE weakness; ?receptive aphasia  Psychiatric: impulsive  Skin: warm, dry, well perfused, no rashes   Tubes/Lines: LUE midline  Incision: MSI w/ staples - No audible/palpable click  Epicardial Wires: Isoalted

## 2024-11-05 NOTE — PROGRESS NOTE ADULT - SUBJECTIVE AND OBJECTIVE BOX
CC: Patient being seen for rehabilitation follow up.    Patient seen and examined at bedside this morning. He was downgraded to medicine floor and appear sot be doing well with no new complaints. He was sitting in a chair with good trunk control. Still with delayed reaction and speech, improving command following.     FUNCTIONAL PROGRESS  OT: Mod to max assist for ADLS    VITALS  T(C): 37.2 (11-05-24 @ 11:40), Max: 37.3 (11-04-24 @ 22:21)  HR: 85 (11-05-24 @ 11:40) (74 - 85)  BP: 116/74 (11-05-24 @ 11:40) (116/74 - 163/71)  RR: 18 (11-05-24 @ 11:40) (18 - 20)  SpO2: 97% (11-05-24 @ 11:40) (93% - 98%)  Wt(kg): --    MEDICATIONS   acetaminophen     Tablet .. 975 milliGRAM(s) every 6 hours PRN  aspirin  chewable 81 milliGRAM(s) daily  bisacodyl Suppository 10 milliGRAM(s) once  chlorhexidine 2% Cloths 1 Application(s) daily  cloNIDine 0.1 milliGRAM(s) three times a day  enoxaparin Injectable 40 milliGRAM(s) every 24 hours  furosemide    Tablet 40 milliGRAM(s) daily  levETIRAcetam 1000 milliGRAM(s) two times a day  methocarbamol 750 milliGRAM(s) three times a day  metoprolol tartrate 50 milliGRAM(s) two times a day  pantoprazole    Tablet 40 milliGRAM(s) before breakfast  polyethylene glycol 3350 17 Gram(s) daily  senna 2 Tablet(s) at bedtime  sodium chloride 0.9% lock flush 10 milliLiter(s) every 1 hour PRN      RECENT LABS/IMAGING  - Reviewed Today                        10.7   21.02 )-----------( 356      ( 05 Nov 2024 07:20 )             32.1     11-05    139  |  101  |  17.5  ----------------------------<  104[H]  3.6   |  26.0  |  0.79    Ca    7.9[L]      05 Nov 2024 07:20  Mg     2.2     11-05        Urinalysis Basic - ( 05 Nov 2024 07:20 )    Color: x / Appearance: x / SG: x / pH: x  Gluc: 104 mg/dL / Ketone: x  / Bili: x / Urobili: x   Blood: x / Protein: x / Nitrite: x   Leuk Esterase: x / RBC: x / WBC x   Sq Epi: x / Non Sq Epi: x / Bacteria: x      ----------------------------------------------------------------------------------------  PHYSICAL EXAM  Constitutional - NAD, Comfortable  HEENT - NCAT, EOMI  Neck - Supple, No limited ROM  Chest - Breathing comfortably, No wheezing  Cardiovascular - S1S2   Abdomen - Soft   Extremities - No C/C/E, No calf tenderness   Neurologic Exam -                    Cognitive - AAO to self, place, date, year, situation     Communication - Mild dysarthria with difficulty initiating speech      Cranial Nerves - EOMI, PERRLA, Difficulty following instructions for confrontation, Facial sensation intact, Facial Motor intact, Hearing intact b/l, Shoulder shrug diminished on L , Hypoglossal intact     FUNCTIONAL MOTOR EXAM -                     LEFT    UE - ShAB 3/5, EF 4/5, EE 3/5, WE 4/5,  5/5                    RIGHT UE - ShAB 4/5, EF 4/5, EE 4/5, WE 5/5,  5/5                    LEFT    LE - HF 2/5, KE 4/5, DF 3/5, PF 4/5                    RIGHT LE - HF 4/5, KE 4/5, DF 5/5, PF 5/5        Sensory - Intact to LT     Reflexes - DTR Intact, No primitive reflexive     Coordination - FTN intact on R and impaired on L     OculoVestibular - No saccades, No nystagmus, VOR         Balance - WNL Static  Psychiatric - Mood stable, Affect WNL  ----------------------------------------------------------------------------------------  ASSESSMENT/PLAN  39y right handed Male with functional deficits after Aortic dissection s/p Bentall procedure with reconstruction of the RCA on 10/29. H/C complicated by hypovolemia respiratory insufficieny and seizure.  Now with motor and cognitive functional deficits.     #Seizure  #Motor and cognitive Functional Deficits  - Agree with seizure management as per neurology  - Continue PT/OT/SLP  - Encouraged him to preform exercise program in bed/chair  - Patient would be a good candidate for acute inpatient rehabilitation when medically stable    #Aortic dissection s/p repair  - Continue management by CT surgery    #DVT PPX - SCDs and lovenox       CC: Patient being seen for rehabilitation follow up.    Patient seen and examined at bedside this morning. He was downgraded to medicine floor and appear sot be doing well with no new complaints. He was sitting in a chair with good trunk control. Still with delayed reaction and speech, improving command following.     FUNCTIONAL PROGRESS  OT: Mod to max assist for ADLS    VITALS  T(C): 37.2 (11-05-24 @ 11:40), Max: 37.3 (11-04-24 @ 22:21)  HR: 85 (11-05-24 @ 11:40) (74 - 85)  BP: 116/74 (11-05-24 @ 11:40) (116/74 - 163/71)  RR: 18 (11-05-24 @ 11:40) (18 - 20)  SpO2: 97% (11-05-24 @ 11:40) (93% - 98%)  Wt(kg): --    MEDICATIONS   acetaminophen     Tablet .. 975 milliGRAM(s) every 6 hours PRN  aspirin  chewable 81 milliGRAM(s) daily  bisacodyl Suppository 10 milliGRAM(s) once  chlorhexidine 2% Cloths 1 Application(s) daily  cloNIDine 0.1 milliGRAM(s) three times a day  enoxaparin Injectable 40 milliGRAM(s) every 24 hours  furosemide    Tablet 40 milliGRAM(s) daily  levETIRAcetam 1000 milliGRAM(s) two times a day  methocarbamol 750 milliGRAM(s) three times a day  metoprolol tartrate 50 milliGRAM(s) two times a day  pantoprazole    Tablet 40 milliGRAM(s) before breakfast  polyethylene glycol 3350 17 Gram(s) daily  senna 2 Tablet(s) at bedtime  sodium chloride 0.9% lock flush 10 milliLiter(s) every 1 hour PRN      RECENT LABS/IMAGING  - Reviewed Today                        10.7   21.02 )-----------( 356      ( 05 Nov 2024 07:20 )             32.1     11-05    139  |  101  |  17.5  ----------------------------<  104[H]  3.6   |  26.0  |  0.79    Ca    7.9[L]      05 Nov 2024 07:20  Mg     2.2     11-05        Urinalysis Basic - ( 05 Nov 2024 07:20 )    Color: x / Appearance: x / SG: x / pH: x  Gluc: 104 mg/dL / Ketone: x  / Bili: x / Urobili: x   Blood: x / Protein: x / Nitrite: x   Leuk Esterase: x / RBC: x / WBC x   Sq Epi: x / Non Sq Epi: x / Bacteria: x      ----------------------------------------------------------------------------------------  PHYSICAL EXAM  Constitutional - NAD, Comfortable  HEENT - NCAT, EOMI  Neck - Supple, No limited ROM  Chest - Breathing comfortably, No wheezing  Cardiovascular - S1S2   Abdomen - Soft   Extremities - No C/C/E, No calf tenderness   Neurologic Exam -                    Cognitive - AAO to self, place, date, year, situation     Communication - Mild dysarthria with difficulty initiating speech      Cranial Nerves - EOMI, PERRLA, Difficulty following instructions for confrontation, Facial sensation intact, Facial Motor intact, Hearing intact b/l, Shoulder shrug diminished on L , Hypoglossal intact     FUNCTIONAL MOTOR EXAM -                     LEFT    UE - ShAB 3/5, EF 4/5, EE 3/5, WE 4/5,  5/5                    RIGHT UE - ShAB 4/5, EF 4/5, EE 4/5, WE 5/5,  5/5                    LEFT    LE - HF 2/5, KE 4/5, DF 3/5, PF 4/5                    RIGHT LE - HF 4/5, KE 4/5, DF 5/5, PF 5/5        Sensory - Intact to LT     Reflexes - DTR Intact, No primitive reflexive     Coordination - FTN intact on R and impaired on L     OculoVestibular - No saccades, No nystagmus, VOR         Balance - WNL Static  Psychiatric - Mood stable, Affect WNL  ----------------------------------------------------------------------------------------  ASSESSMENT/PLAN  39y right handed Male with functional deficits after Aortic dissection s/p Bentall procedure with reconstruction of the RCA on 10/29. H/C complicated by hypovolemia respiratory insufficieny and seizure.  Now with motor and cognitive functional deficits.     #Seizure  #Motor and cognitive Functional Deficits --- TRIAL AMANTADINE 50mg Q8am/12pm  - Agree with seizure management as per neurology  - Continue PT/OT/SLP  - Encouraged him to preform exercise program in bed/chair  - Patient would be a good candidate for acute inpatient rehabilitation when medically stable    #Aortic dissection s/p repair  - Continue management by CT surgery    #DVT PPX - SCDs and lovenox

## 2024-11-05 NOTE — CHART NOTE - NSCHARTNOTEFT_GEN_A_CORE
Source: Patient, chart    Current Diet: Diet, Regular:   Consistent Carbohydrate {No Snacks} (CSTCHO)  DASH/TLC {Sodium & Cholesterol Restricted} (DASH) (11-02-24 @ 15:01)    PO intake:  %     Source for PO intake: Patient, chart    Current Weight:   10/30 248.2 lbs  10/31 248.9 lbs  11/1 248.9 lbs  11/2 249.1 lbs  11/5 243.8 lbs    Generalized 1+ edema     Pertinent Medications: MEDICATIONS  (STANDING):  bisacodyl Suppository 10 milliGRAM(s) Rectal once  cloNIDine 0.1 milliGRAM(s) Oral three times a day  enoxaparin Injectable 40 milliGRAM(s) SubCutaneous every 24 hours  furosemide    Tablet 40 milliGRAM(s) Oral daily  levETIRAcetam 1000 milliGRAM(s) Oral two times a day  metoprolol tartrate 50 milliGRAM(s) Oral two times a day  pantoprazole    Tablet 40 milliGRAM(s) Oral before breakfast  polyethylene glycol 3350 17 Gram(s) Oral daily  senna 2 Tablet(s) Oral at bedtime    Pertinent Labs: 11-05 Na139 mmol/L Glu 104 mg/dL[H] K+ 3.6 mmol/L Cr  0.79 mg/dL BUN 17.5 mg/dL     Estimated Needs:   [x] recalculated:   5973-4833 kcal (20-25 kcal/kg 111kg)  111-133 g protein (1-1.2g/kg 111kg)    Hospital Course: 38 y/o male with no pertinent past medical history presents to  with c/o chest tightness and pain. Pt had CTA chest significant for Aortic dissection which is noted from the level of the root of the aorta extending into the ascending, aortic arch, descending thoracic aorta as well as extending into the abdominal aorta and is noted to end in the distal left common iliac artery. Transferred to [facility] direct to OR for biobental with reconstruction of the RCA. Post op with hypertension and seizure (treated with IV ativan, propofol, keppra loaded). CT head/neck no acute findings. Neurology following. s/p cEEG without acute events. Overall, recovering well. Remains with left sided weakness. IV antihypertensives adjusted to PO regimen with appropriate BP limits. Dispo pending for acute rehab.     Current Nutrition Diagnosis: Increased nutrient needs (protein, micronutrient) related to increased physiologic demand for healing as evidenced by s/p repair of Type A dissection, Bentall Procedure with reconstruction of RCA.    Patient previously NPO and now receiving DASH/TLC, Consistent carbohydrate diet. Pt tolerating current diet well with good appetite/PO intake. Pt reports eating about 75% of breakfast this AM. Pt with no c/o N/V/C/D at this time. Reports his last BM was last night. Pt refused diet education on current restrictions at this time. Will continue to monitor and follow up as needed. RD remains available.     Recommendations:   1) Continue diet as tolerated.   2) Encourage po intake, monitor diet tolerance, and provide assistance at meals as needed.   3) Monitor BG levels, correct prn   4) Rx: MVI daily.   5) Obtain daily weights to monitor trends.     Monitoring and Evaluation:   [x] PO intake [x] Tolerance to diet prescription [X] Weights  [X] Follow up per protocol [X] Labs: chem 8, POCT glucose

## 2024-11-05 NOTE — PROGRESS NOTE ADULT - SUBJECTIVE AND OBJECTIVE BOX
[facility]                                        [facility]                                 Hany Becker, & Raghavendra                                  [address]                                        [place], [place], [address]                                             [phone]        CC: Aortic dissection and altered mental status. Seizure.    HPI:   The patient is a 39y Male who presented to [facility] with severe back pain radiating to legs followed by chest pain. Imaging confirmed aortic dissection and he was transferred here for surgery on 10/29/24.  He has remained on mechanical ventilator since the surgery and has been poorly responsive when sedation held.   There was a question of twitching of the extremities and neurology evaluation requested 10/31/24.    Interim history: downgraded to 4T, no seizures    ROS:   Denies headache or dizziness.  Denies chest pain.  Denies shortness of breath.  (+) left visual neglect, left facial/arm weakness      MEDICATIONS  (STANDING):  aspirin  chewable 81 milliGRAM(s) Oral daily  bisacodyl Suppository 10 milliGRAM(s) Rectal once  chlorhexidine 2% Cloths 1 Application(s) Topical daily  cloNIDine 0.1 milliGRAM(s) Oral three times a day  enoxaparin Injectable 40 milliGRAM(s) SubCutaneous every 24 hours  furosemide    Tablet 40 milliGRAM(s) Oral daily  levETIRAcetam 1000 milliGRAM(s) Oral two times a day  methocarbamol 750 milliGRAM(s) Oral three times a day  metoprolol tartrate 50 milliGRAM(s) Oral two times a day  pantoprazole    Tablet 40 milliGRAM(s) Oral before breakfast  polyethylene glycol 3350 17 Gram(s) Oral daily  senna 2 Tablet(s) Oral at bedtime    MEDICATIONS  (PRN):  acetaminophen     Tablet .. 975 milliGRAM(s) Oral every 6 hours PRN Temp greater or equal to 38C (100.4F), Mild Pain (1 - 3), Moderate Pain (4 - 6), Severe Pain (7 - 10)  sodium chloride 0.9% lock flush 10 milliLiter(s) IV Push every 1 hour PRN Pre/post blood products, medications, blood draw, and to maintain line patency      Vital Signs Last 24 Hrs  T(C): 37.2 (05 Nov 2024 11:40), Max: 37.3 (04 Nov 2024 22:21)  T(F): 98.9 (05 Nov 2024 11:40), Max: 99.2 (04 Nov 2024 22:21)  HR: 85 (05 Nov 2024 11:40) (74 - 85)  BP: 116/74 (05 Nov 2024 11:40) (116/74 - 163/71)  BP(mean): 107 (05 Nov 2024 05:32) (97 - 107)  RR: 18 (05 Nov 2024 11:40) (18 - 20)  SpO2: 97% (05 Nov 2024 11:40) (93% - 98%)    Parameters below as of 05 Nov 2024 11:40  Patient On (Oxygen Delivery Method): room air      Detailed Neurologic Exam:    Mental status: The patient is awake and alert. There is no aphasia. There is mild dysarthria.     Cranial nerves: Pupils equal and react symmetrically to light. There is neglect in left  visual field. Extraocular motion is full with no nystagmus. Facial sensation is intact. Facial musculature is asymmetric, left central weakness. Palate elevates symmetrically. Tongue is midline.    Motor: There is normal bulk and tone.  There is no tremor. (+) left pronator drift  Strength grossly 5/5 on right.  Left side moving more slowly than right. Strength 4/5 in left arm and leg. Left leg externally rotated.     Sensation: Withdraws right more rigorously than left.    Cerebellar: No dysmetria on finger nose testing.    Labs:                           10.7   21.02 )-----------( 356      ( 05 Nov 2024 07:20 )             32.1     11-05    139  |  101  |  17.5  ----------------------------<  104[H]  3.6   |  26.0  |  0.79    Ca    7.9[L]      05 Nov 2024 07:20  Mg     2.2     11-05      EEG:   Abnormal  EEG in the awake / drowsy / asleep state(s).   1. Focal continuous delta slowing and absence of faster frequencies over the right hemisphere   2. Generalized background slowing, moderate   There were no seizures recorded.

## 2024-11-06 LAB
ANION GAP SERPL CALC-SCNC: 11 MMOL/L — SIGNIFICANT CHANGE UP (ref 5–17)
BUN SERPL-MCNC: 14.6 MG/DL — SIGNIFICANT CHANGE UP (ref 8–20)
CALCIUM SERPL-MCNC: 8.1 MG/DL — LOW (ref 8.4–10.5)
CHLORIDE SERPL-SCNC: 99 MMOL/L — SIGNIFICANT CHANGE UP (ref 96–108)
CO2 SERPL-SCNC: 28 MMOL/L — SIGNIFICANT CHANGE UP (ref 22–29)
CREAT SERPL-MCNC: 0.94 MG/DL — SIGNIFICANT CHANGE UP (ref 0.5–1.3)
EGFR: 107 ML/MIN/1.73M2 — SIGNIFICANT CHANGE UP
GLUCOSE SERPL-MCNC: 126 MG/DL — HIGH (ref 70–99)
HCT VFR BLD CALC: 30.3 % — LOW (ref 39–50)
HGB BLD-MCNC: 9.9 G/DL — LOW (ref 13–17)
MAGNESIUM SERPL-MCNC: 2.1 MG/DL — SIGNIFICANT CHANGE UP (ref 1.8–2.6)
MCHC RBC-ENTMCNC: 28.4 PG — SIGNIFICANT CHANGE UP (ref 27–34)
MCHC RBC-ENTMCNC: 32.7 G/DL — SIGNIFICANT CHANGE UP (ref 32–36)
MCV RBC AUTO: 87.1 FL — SIGNIFICANT CHANGE UP (ref 80–100)
PLATELET # BLD AUTO: 443 K/UL — HIGH (ref 150–400)
POTASSIUM SERPL-MCNC: 3.8 MMOL/L — SIGNIFICANT CHANGE UP (ref 3.5–5.3)
POTASSIUM SERPL-SCNC: 3.8 MMOL/L — SIGNIFICANT CHANGE UP (ref 3.5–5.3)
RBC # BLD: 3.48 M/UL — LOW (ref 4.2–5.8)
RBC # FLD: 14 % — SIGNIFICANT CHANGE UP (ref 10.3–14.5)
SODIUM SERPL-SCNC: 138 MMOL/L — SIGNIFICANT CHANGE UP (ref 135–145)
SURGICAL PATHOLOGY STUDY: SIGNIFICANT CHANGE UP
WBC # BLD: 17.55 K/UL — HIGH (ref 3.8–10.5)
WBC # FLD AUTO: 17.55 K/UL — HIGH (ref 3.8–10.5)

## 2024-11-06 PROCEDURE — 99024 POSTOP FOLLOW-UP VISIT: CPT

## 2024-11-06 PROCEDURE — 70450 CT HEAD/BRAIN W/O DYE: CPT | Mod: 26

## 2024-11-06 PROCEDURE — 99233 SBSQ HOSP IP/OBS HIGH 50: CPT

## 2024-11-06 PROCEDURE — 71045 X-RAY EXAM CHEST 1 VIEW: CPT | Mod: 26

## 2024-11-06 RX ORDER — POTASSIUM CHLORIDE 10 MEQ
40 TABLET, EXTENDED RELEASE ORAL ONCE
Refills: 0 | Status: COMPLETED | OUTPATIENT
Start: 2024-11-06 | End: 2024-11-06

## 2024-11-06 RX ORDER — CETIRIZINE HCL 10 MG/1
10 TABLET ORAL ONCE
Refills: 0 | Status: COMPLETED | OUTPATIENT
Start: 2024-11-06 | End: 2024-11-06

## 2024-11-06 RX ADMIN — PANTOPRAZOLE SODIUM 40 MILLIGRAM(S): 40 TABLET, DELAYED RELEASE ORAL at 08:01

## 2024-11-06 RX ADMIN — Medication 81 MILLIGRAM(S): at 13:12

## 2024-11-06 RX ADMIN — Medication 50 MILLIGRAM(S): at 17:06

## 2024-11-06 RX ADMIN — Medication 50 MILLIGRAM(S): at 08:01

## 2024-11-06 RX ADMIN — CETIRIZINE HCL 10 MILLIGRAM(S): 10 TABLET ORAL at 22:04

## 2024-11-06 RX ADMIN — METHOCARBAMOL 750 MILLIGRAM(S): 500 TABLET ORAL at 21:09

## 2024-11-06 RX ADMIN — CLONIDINE HYDROCHLORIDE 0.1 MILLIGRAM(S): 0.2 TABLET ORAL at 08:01

## 2024-11-06 RX ADMIN — METHOCARBAMOL 750 MILLIGRAM(S): 500 TABLET ORAL at 13:12

## 2024-11-06 RX ADMIN — CLONIDINE HYDROCHLORIDE 0.1 MILLIGRAM(S): 0.2 TABLET ORAL at 21:09

## 2024-11-06 RX ADMIN — Medication 40 MILLIGRAM(S): at 08:02

## 2024-11-06 RX ADMIN — Medication 40 MILLIGRAM(S): at 21:09

## 2024-11-06 RX ADMIN — LEVETIRACETAM 1000 MILLIGRAM(S): 500 TABLET, FILM COATED ORAL at 17:06

## 2024-11-06 RX ADMIN — Medication 40 MILLIEQUIVALENT(S): at 13:12

## 2024-11-06 RX ADMIN — LEVETIRACETAM 1000 MILLIGRAM(S): 500 TABLET, FILM COATED ORAL at 08:00

## 2024-11-06 RX ADMIN — CHLORHEXIDINE GLUCONATE 1 APPLICATION(S): 40 SOLUTION TOPICAL at 13:12

## 2024-11-06 RX ADMIN — CLONIDINE HYDROCHLORIDE 0.1 MILLIGRAM(S): 0.2 TABLET ORAL at 13:12

## 2024-11-06 RX ADMIN — METHOCARBAMOL 750 MILLIGRAM(S): 500 TABLET ORAL at 08:01

## 2024-11-06 NOTE — CHART NOTE - NSCHARTNOTEFT_GEN_A_CORE
As per Neurology, suspect fright frontoparietal CVA given physical exam findings.  Check repeat CT Head w/o for further evaluation.  Given recent open heart surgery and epicardial wires, MRI prohibitive for at least 6 weeks postop.  Discussed plan with Dr. Grimm

## 2024-11-06 NOTE — PROGRESS NOTE ADULT - SUBJECTIVE AND OBJECTIVE BOX
BRIEF HOSPITAL COURSE  40 y/o male with no pertinent past medical history presents to  with c/o chest tightness and pain, as per  chart patient described pain as a "squeezing" sensation across chest radiating down to leg originally was prescribed muscle relaxant in UC presented to  ED patient Pt had CTA chest significant for Aortic dissection which is noted from the level of the root of the aorta extending into the ascending, aortic arch, descending thoracic aorta as well as extending into the abdominal aorta and is noted to end in the distal left common iliac artery. The dissection flap is in close proximity to the right coronary artery. The left renal artery is arising from the false lumen. Transferred to [facility] direct to OR for biobental with reconstruction of the RCA. Post op with hypertension and seizure (treated with IV ativan, propofol, keppra loaded). CT head/neck no acute findings. Neurology following. s/p cEEG without acute events. Overall, recovering well. Remains with left sided weakness. IV antihypertensives adjusted to PO regimen with appropriate BP limits. Dispo pending for acute rehab.     SIGNIFICANT RECENT/PAST 24 HR EVENTS  No acute events reported overnight.   Uptrending leukocytosis, UA neg, BCx ngtd    SUBJECTIVE  Patient seen and examined on follow up. Pt currently lying in bed in NAD. Denies fevers, chills, HA, dizziness, CP, SOB, abd pain, N/V/D, numbness/tingling in extremities, or any other acute complaints. States pain well controlled on current regimen.   +Tolerating diet  +Passing BMs since surgery   +Passing gas  +Ambulating during day  +Using incentive as instructed  +Showering  ROS negative x 10 systems except as noted above.    PAST MEDICAL & SURGICAL HISTORY  No pertinent past medical history  No significant past surgical history    DAILY REVIEW  Telemetry: Sinus rhythm   Vital Signs Last 24 Hrs  T(C): 36.8 (05 Nov 2024 01:16), Max: 38.1 (04 Nov 2024 05:00)  T(F): 98.2 (05 Nov 2024 01:16), Max: 100.6 (04 Nov 2024 05:00)  HR: 78 (05 Nov 2024 01:16) (73 - 88)  BP: 163/71 (05 Nov 2024 01:16) (109/55 - 163/90)  BP(mean): 97 (04 Nov 2024 22:21) (72 - 109)  RR: 19 (05 Nov 2024 01:16) (15 - 29)  SpO2: 93% (05 Nov 2024 01:16) (93% - 98%)    Parameters below as of 05 Nov 2024 01:16  Patient On (Oxygen Delivery Method): room air    I&O's Detail    03 Nov 2024 07:01  -  04 Nov 2024 07:00  --------------------------------------------------------  IN:    IV PiggyBack: 50 mL    IV PiggyBack: 100 mL    IV PiggyBack: 150 mL    NiCARdipine: 75 mL    Oral Fluid: 730 mL    sodium chloride 0.9%: 105 mL    sodium chloride 0.9%: 210 mL  Total IN: 1420 mL    OUT:    Indwelling Catheter - Urethral (mL): 2090 mL  Total OUT: 2090 mL    Total NET: -670 mL    04 Nov 2024 07:01  -  05 Nov 2024 04:05  --------------------------------------------------------  IN:    Oral Fluid: 640 mL  Total IN: 640 mL    OUT:    Voided (mL): 1200 mL  Total OUT: 1200 mL    Total NET: -560 mL    Last Bowel Movement: 03-Nov-2024 (11-05-24 @ 01:30)  Last Bowel Movement: 03-Nov-2024 (11-04-24 @ 15:00)  Last Bowel Movement: 03-Nov-2024 (11-03-24 @ 08:00)  Last Bowel Movement: 03-Nov-2024 (11-03-24 @ 00:00)  Last Bowel Movement: 02-Nov-2024 (11-02-24 @ 12:00)    Admit Wt: Drug Dosing Weight  Height (cm): 170.2 (29 Oct 2024 23:45)  Weight (kg): 107 (29 Oct 2024 23:45)  BMI (kg/m2): 36.9 (29 Oct 2024 23:45)  BSA (m2): 2.17 (29 Oct 2024 23:45)    LABS                        9.4    19.36 )-----------( 242      ( 04 Nov 2024 02:15 )             27.6     11-04    138  |  102  |  23.3[H]  ----------------------------<  110[H]  3.8   |  24.0  |  0.85    Ca    8.1[L]      04 Nov 2024 02:15  Mg     2.3     11-04    POCT Blood Glucose.: 112 mg/dL (11-04-24 @ 07:17)    MEDICATIONS  Home Medications:    MEDICATIONS  (STANDING):  aspirin  chewable 81 milliGRAM(s) Oral daily  bisacodyl Suppository 10 milliGRAM(s) Rectal once  chlorhexidine 2% Cloths 1 Application(s) Topical daily  cloNIDine 0.1 milliGRAM(s) Oral three times a day  enoxaparin Injectable 40 milliGRAM(s) SubCutaneous every 24 hours  furosemide    Tablet 40 milliGRAM(s) Oral daily  levETIRAcetam 1000 milliGRAM(s) Oral two times a day  methocarbamol 750 milliGRAM(s) Oral three times a day  metoprolol tartrate 50 milliGRAM(s) Oral two times a day  pantoprazole    Tablet 40 milliGRAM(s) Oral before breakfast  polyethylene glycol 3350 17 Gram(s) Oral daily  senna 2 Tablet(s) Oral at bedtime    MEDICATIONS  (PRN):  acetaminophen     Tablet .. 975 milliGRAM(s) Oral every 6 hours PRN Temp greater or equal to 38C (100.4F), Mild Pain (1 - 3), Moderate Pain (4 - 6), Severe Pain (7 - 10)  sodium chloride 0.9% lock flush 10 milliLiter(s) IV Push every 1 hour PRN Pre/post blood products, medications, blood draw, and to maintain line patency    ALLERGIES  Allergy Status Unknown  No Known Allergies    DIAGNOSTICS  All relevant and available laboratory results, radiology and medications reviewed.    PHYSICAL EXAM  Constitutional: NAD  Neck: supple, trachea midline. No JVD   Respiratory: Breath sounds diminished b/l lower fields to auscultation, no accessory muscle use noted. No wheezing, rales, or rhonchi noted b/l   Cardiovascular: Regular rate, regular rhythm, normal S1, S2; no murmurs or rub   Gastrointestinal: Soft, non-tender, non-distended, + bowel sounds   Extremities: GOSS x 4, 2+ peripheral edema, no cyanosis, no clubbing    Vascular: Equal and normal pulses: 2+ peripheral pulses throughout  Neurological: A+O x 3; speech clear and intact; no gross sensory deficits; mild LUE/LLE weakness; ?receptive aphasia  Psychiatric: impulsive  Skin: warm, dry, well perfused, no rashes   Tubes/Lines: LUE midline  Incision: MSI w/ staples - No audible/palpable click; Rt axillary incs staples  Epicardial Wires: Isolated

## 2024-11-06 NOTE — PROGRESS NOTE ADULT - SUBJECTIVE AND OBJECTIVE BOX
[facility]                                        Ne[facility] [place]                                 Hany Becker, & Raghavendra                                  [address]                                        [place], [place], [address]                                             [phone]        CC: Aortic dissection and altered mental status. Seizure.    HPI:   The patient is a 39y Male who presented to [facility] with severe back pain radiating to legs followed by chest pain. Imaging confirmed aortic dissection and he was transferred here for surgery on 10/29/24.  He has remained on mechanical ventilator since the surgery and has been poorly responsive when sedation held.   There was a question of twitching of the extremities and neurology evaluation requested 10/31/24. (JW)    Interim history:  no further seizures    ROS:   Denies headache or dizziness.  Denies chest pain.  Denies shortness of breath.  (+) left visual neglect, left facial/arm weakness    MEDICATIONS  (STANDING):  aspirin  chewable 81 milliGRAM(s) Oral daily  chlorhexidine 2% Cloths 1 Application(s) Topical daily  cloNIDine 0.1 milliGRAM(s) Oral three times a day  enoxaparin Injectable 40 milliGRAM(s) SubCutaneous every 24 hours  furosemide    Tablet 40 milliGRAM(s) Oral daily  levETIRAcetam 1000 milliGRAM(s) Oral two times a day  methocarbamol 750 milliGRAM(s) Oral three times a day  metoprolol tartrate 50 milliGRAM(s) Oral two times a day  pantoprazole    Tablet 40 milliGRAM(s) Oral before breakfast    MEDICATIONS  (PRN):  acetaminophen     Tablet .. 975 milliGRAM(s) Oral every 6 hours PRN Temp greater or equal to 38C (100.4F), Mild Pain (1 - 3), Moderate Pain (4 - 6), Severe Pain (7 - 10)  sodium chloride 0.9% lock flush 10 milliLiter(s) IV Push every 1 hour PRN Pre/post blood products, medications, blood draw, and to maintain line patency      Vital Signs Last 24 Hrs  T(C): 37.3 (06 Nov 2024 13:00), Max: 37.3 (05 Nov 2024 16:25)  T(F): 99.1 (06 Nov 2024 13:00), Max: 99.1 (05 Nov 2024 16:25)  HR: 80 (06 Nov 2024 13:00) (73 - 91)  BP: 115/70 (06 Nov 2024 13:00) (115/70 - 172/76)  BP(mean): 85 (06 Nov 2024 13:00) (85 - 85)  RR: 17 (06 Nov 2024 13:00) (17 - 18)  SpO2: 98% (06 Nov 2024 13:00) (92% - 98%)    Parameters below as of 06 Nov 2024 13:00  Patient On (Oxygen Delivery Method): room air      Detailed Neurologic Exam:    Mental status: The patient is awake and alert. There is no aphasia. There is mild dysarthria.     Cranial nerves: Pupils equal and react symmetrically to light. There is neglect in left  visual field. Extraocular motion is full with no nystagmus. Facial sensation is intact. Facial musculature is asymmetric, left central weakness. Palate elevates symmetrically. Tongue is midline.    Motor: There is normal bulk and tone.  There is no tremor. (+) left pronator drift  Strength grossly 5/5 on right.  Left side moving more slowly than right. Strength 4/5 in left arm (worst in his hand) and leg.     Sensation: Withdraws right more rigorously than left.    Cerebellar: No dysmetria on finger nose testing.    Labs:                           10.7   21.02 )-----------( 356      ( 05 Nov 2024 07:20 )             32.1     11-05    139  |  101  |  17.5  ----------------------------<  104[H]  3.6   |  26.0  |  0.79    Ca    7.9[L]      05 Nov 2024 07:20  Mg     2.2     11-05      EEG Classification / Summary 11/2-11/3/24:   Abnormal  EEG in the awake / drowsy / asleep state(s).   1. Focal continuous delta slowing and absence of faster frequencies over the right hemisphere   2. Generalized background slowing, moderate     Clinical Impression:   1. Findings suggestive of a structural abnormality in the right hemisphere   2. Moderate diffuse cerebral dysfunction.     There were no seizures recorded.      CT Head No Cont (11.06.24 @ 08:21)   IMPRESSION:  No acute intracranial hemorrhage, mass effect, or midline shift.

## 2024-11-06 NOTE — PROGRESS NOTE ADULT - SUBJECTIVE AND OBJECTIVE BOX
CC: Patient being seen for rehabilitation follow up.  Patient feels well.  Reports pain is controlled.  Slept better last night.     FUNCTIONAL PROGRESS   PT  Bed Mobility  Bed Mobility Training Supine-to-Sit: minimum assist (75% patient effort);  1 person assist  Bed Mobility Training Limitations: impaired ability to control trunk for mobility;  decreased strength    Sit-Stand Transfer Training  Transfer Training Sit-to-Stand Transfer: minimum assist (75% patient effort);  1 person assist  Transfer Training Stand-to-Sit Transfer: minimum assist (75% patient effort);  1 person assist  Sit-to-Stand Transfer Training Transfer Safety Analysis: impaired balance    Gait Training  Gait Training: minimum assist (75% patient effort);  2 person assist;  rolling walker;  40ft  Gait Analysis: impaired balance;  impaired coordination     OT  Bathing Training:     · Level of [place]	moderate assist (50% patients effort); maximum assist (25% patients effort)  · Physical Assist/Nonphysical Assist	1 person assist    Upper Body Dressing Training:     · Level of [place]	moderate assist (50% patients effort); to don gown  · Physical Assist/Nonphysical Assist	1 person assist    Lower Body Dressing Training:     · Level of [place]	maximum assist (25% patients effort); to don socks  · Physical Assist/Nonphysical Assist	1 person assist    Toilet Hygiene Training:     · Level of [place]	maximum assist (25% patients effort); secondary to [place] catheter  · Physical Assist/Nonphysical Assist	1 person assist    Grooming Training:     · Level of [place]	moderate assist (50% patients effort)  · Physical Assist/Nonphysical Assist	1 person assist    Eating/Self-Feeding Training:     · Level of [place]	minimum assist (75% patients effort); to manage a cup  · Physical Assist/Nonphysical Assist	1 person assist      VITALS  T(C): 36.9 (24 @ 07:37), Max: 37.3 (24 @ 16:25)  HR: 84 (24 @ 07:37) (73 - 91)  BP: 172/76 (24 @ 07:37) (116/74 - 172/76)  RR: 18 (24 @ 07:37) (18 - 18)  SpO2: 92% (24 @ 07:37) (92% - 98%)  Wt(kg): --    MEDICATIONS   acetaminophen     Tablet .. 975 milliGRAM(s) every 6 hours PRN  aspirin  chewable 81 milliGRAM(s) daily  chlorhexidine 2% Cloths 1 Application(s) daily  cloNIDine 0.1 milliGRAM(s) three times a day  enoxaparin Injectable 40 milliGRAM(s) every 24 hours  furosemide    Tablet 40 milliGRAM(s) daily  levETIRAcetam 1000 milliGRAM(s) two times a day  methocarbamol 750 milliGRAM(s) three times a day  metoprolol tartrate 50 milliGRAM(s) two times a day  pantoprazole    Tablet 40 milliGRAM(s) before breakfast  sodium chloride 0.9% lock flush 10 milliLiter(s) every 1 hour PRN      RECENT LABS/IMAGING  - Reviewed Today                        10.7   21.02 )-----------( 356      ( 2024 07:20 )             32.1     11    139  |  101  |  17.5  ----------------------------<  104[H]  3.6   |  26.0  |  0.79    Ca    7.9[L]      2024 07:20  Mg     2.2     11        Urinalysis Basic - ( 2024 18:30 )    Color: Yellow / Appearance: Clear / S.016 / pH: x  Gluc: x / Ketone: Negative mg/dL  / Bili: Negative / Urobili: 1.0 mg/dL   Blood: x / Protein: Trace mg/dL / Nitrite: Negative   Leuk Esterase: Negative / RBC: 0 /HPF / WBC 1 /HPF   Sq Epi: x / Non Sq Epi: 0 /HPF / Bacteria: Negative /HPF              ----------------------------------------------------------------------------------------  PHYSICAL EXAM  Constitutional - NAD, Comfortable   Extremities - Diffuse swelling  Neurologic Exam -                    Cognitive - AAO to self, place, date, year, situation     Communication - Delayed processing, Mild dysarthria, Word initiation/finding difficulties      Cranial Nerves - No lip droop     FUNCTIONAL MOTOR EXAM - Left hemiparesis, Left inattention                    LEFT    UE - ShAB 3/5, EF 4/5, EE 3/5, WE 4/5,  5/5                    RIGHT UE - ShAB 4/5, EF 4/5, EE 4/5, WE 5/5,  5/5                    LEFT    LE - HF 3/5, KE 4/5, DF 3/5, PF 4/5                    RIGHT LE - HF 4/5, KE 4/5, DF 5/5, PF 5/5        Sensory - Intact to LT   Psychiatric - Mood stable, Affect Flat  ----------------------------------------------------------------------------------------  ASSESSMENT/PLAN  39yM with functional deficits related to aortic dissection s/p repair with post-op CVA  Aortic Dissection s/p repair, HTN, CHF - Clonidine, Lasix, Lopressor  Acute right CVA - ASA  Seizures - Keppra  Pain - Tylenol, RECOMMEND DC ROBAXIN  DVT PPX - SCDs, Lovenox  Rehab/Impaired mobility and function - Patient continues to require hospitalization for the above diagnoses and ongoing active management of comorbid complications that are substantially posing a threat to bodily function, functional ability and quality of life, necessitating transfer of hospitalization at an acute inpatient rehabilitation facility.     When medically optimized, based on the patient's diagnosis, current functional status and potential for progress, recommend ACUTE inpatient rehabilitation for the functional deficits consisting of 3 hours of therapy/day & 24 hour RN/daily PMR physician for active comorbid medical management. Patient will be able to tolerate 3 hours a day.    Goals for acute inpatient rehab are to achieve modified independence with bed mobility, modified independence with transfers and modified independence with ambulation of 100' over an LOS of 21 days.    Will continue to follow. Rehab recommendations are dependent on how functional progress changes as well as how patient continues to participate and tolerate therapeutic interventions, WHICH MAY CHANGE UPON FOLLOW UP EVALUATIONS. Recommend ongoing mobilization by staff to maintain cardiopulmonary function and prevention of secondary complications related to debility. Have discussed the specific rehabilitation management and recommendations above with rehab clinical care team/rehab liaison.      Total Time Spent on Encounter (reviewing clinical notes, labs, radiology, medications, patient history/exam, assessment and plan) - 50 minutes

## 2024-11-07 DIAGNOSIS — Z29.9 ENCOUNTER FOR PROPHYLACTIC MEASURES, UNSPECIFIED: ICD-10-CM

## 2024-11-07 LAB
ANION GAP SERPL CALC-SCNC: 11 MMOL/L — SIGNIFICANT CHANGE UP (ref 5–17)
BUN SERPL-MCNC: 14.2 MG/DL — SIGNIFICANT CHANGE UP (ref 8–20)
CALCIUM SERPL-MCNC: 8.2 MG/DL — LOW (ref 8.4–10.5)
CHLORIDE SERPL-SCNC: 101 MMOL/L — SIGNIFICANT CHANGE UP (ref 96–108)
CO2 SERPL-SCNC: 28 MMOL/L — SIGNIFICANT CHANGE UP (ref 22–29)
CREAT SERPL-MCNC: 0.86 MG/DL — SIGNIFICANT CHANGE UP (ref 0.5–1.3)
EGFR: 114 ML/MIN/1.73M2 — SIGNIFICANT CHANGE UP
GLUCOSE SERPL-MCNC: 106 MG/DL — HIGH (ref 70–99)
HCT VFR BLD CALC: 30.4 % — LOW (ref 39–50)
HGB BLD-MCNC: 10 G/DL — LOW (ref 13–17)
MAGNESIUM SERPL-MCNC: 2.2 MG/DL — SIGNIFICANT CHANGE UP (ref 1.6–2.6)
MCHC RBC-ENTMCNC: 28.7 PG — SIGNIFICANT CHANGE UP (ref 27–34)
MCHC RBC-ENTMCNC: 32.9 G/DL — SIGNIFICANT CHANGE UP (ref 32–36)
MCV RBC AUTO: 87.1 FL — SIGNIFICANT CHANGE UP (ref 80–100)
PLATELET # BLD AUTO: 489 K/UL — HIGH (ref 150–400)
POTASSIUM SERPL-MCNC: 4 MMOL/L — SIGNIFICANT CHANGE UP (ref 3.5–5.3)
POTASSIUM SERPL-SCNC: 4 MMOL/L — SIGNIFICANT CHANGE UP (ref 3.5–5.3)
RBC # BLD: 3.49 M/UL — LOW (ref 4.2–5.8)
RBC # FLD: 14.4 % — SIGNIFICANT CHANGE UP (ref 10.3–14.5)
SODIUM SERPL-SCNC: 140 MMOL/L — SIGNIFICANT CHANGE UP (ref 135–145)
WBC # BLD: 17.31 K/UL — HIGH (ref 3.8–10.5)
WBC # FLD AUTO: 17.31 K/UL — HIGH (ref 3.8–10.5)

## 2024-11-07 PROCEDURE — 71045 X-RAY EXAM CHEST 1 VIEW: CPT | Mod: 26

## 2024-11-07 PROCEDURE — 99024 POSTOP FOLLOW-UP VISIT: CPT

## 2024-11-07 PROCEDURE — 99233 SBSQ HOSP IP/OBS HIGH 50: CPT | Mod: GC

## 2024-11-07 RX ORDER — ACETAMINOPHEN 500 MG
1000 TABLET ORAL ONCE
Refills: 0 | Status: COMPLETED | OUTPATIENT
Start: 2024-11-07 | End: 2024-11-07

## 2024-11-07 RX ADMIN — CHLORHEXIDINE GLUCONATE 1 APPLICATION(S): 40 SOLUTION TOPICAL at 13:11

## 2024-11-07 RX ADMIN — METHOCARBAMOL 750 MILLIGRAM(S): 500 TABLET ORAL at 22:09

## 2024-11-07 RX ADMIN — CLONIDINE HYDROCHLORIDE 0.1 MILLIGRAM(S): 0.2 TABLET ORAL at 06:03

## 2024-11-07 RX ADMIN — Medication 400 MILLIGRAM(S): at 12:48

## 2024-11-07 RX ADMIN — Medication 50 MILLIGRAM(S): at 17:03

## 2024-11-07 RX ADMIN — LEVETIRACETAM 1000 MILLIGRAM(S): 500 TABLET, FILM COATED ORAL at 17:02

## 2024-11-07 RX ADMIN — CLONIDINE HYDROCHLORIDE 0.1 MILLIGRAM(S): 0.2 TABLET ORAL at 22:09

## 2024-11-07 RX ADMIN — Medication 81 MILLIGRAM(S): at 13:10

## 2024-11-07 RX ADMIN — LEVETIRACETAM 1000 MILLIGRAM(S): 500 TABLET, FILM COATED ORAL at 06:03

## 2024-11-07 RX ADMIN — PANTOPRAZOLE SODIUM 40 MILLIGRAM(S): 40 TABLET, DELAYED RELEASE ORAL at 06:03

## 2024-11-07 RX ADMIN — Medication 1000 MILLIGRAM(S): at 13:00

## 2024-11-07 RX ADMIN — Medication 40 MILLIGRAM(S): at 06:03

## 2024-11-07 RX ADMIN — METHOCARBAMOL 750 MILLIGRAM(S): 500 TABLET ORAL at 06:03

## 2024-11-07 RX ADMIN — Medication 975 MILLIGRAM(S): at 00:40

## 2024-11-07 RX ADMIN — Medication 50 MILLIGRAM(S): at 06:03

## 2024-11-07 RX ADMIN — Medication 975 MILLIGRAM(S): at 02:30

## 2024-11-07 RX ADMIN — Medication 40 MILLIGRAM(S): at 22:09

## 2024-11-07 NOTE — PROGRESS NOTE ADULT - SUBJECTIVE AND OBJECTIVE BOX
Subjective:   39yMale currently resting in bed comfortably. Denies chest pain, palpitations, SOB, HARO, cough, N/V/D/C.      PAST MEDICAL & SURGICAL HISTORY:  No pertinent past medical history      No pertinent past medical history      No significant past surgical history      No significant past surgical history          Medications:  acetaminophen     Tablet .. 975 milliGRAM(s) Oral every 6 hours PRN  aspirin  chewable 81 milliGRAM(s) Oral daily  chlorhexidine 2% Cloths 1 Application(s) Topical daily  cloNIDine 0.1 milliGRAM(s) Oral three times a day  enoxaparin Injectable 40 milliGRAM(s) SubCutaneous every 24 hours  furosemide    Tablet 40 milliGRAM(s) Oral daily  levETIRAcetam 1000 milliGRAM(s) Oral two times a day  methocarbamol 750 milliGRAM(s) Oral three times a day  metoprolol tartrate 50 milliGRAM(s) Oral two times a day  pantoprazole    Tablet 40 milliGRAM(s) Oral before breakfast  sodium chloride 0.9% lock flush 10 milliLiter(s) IV Push every 1 hour PRN      MEDICATIONS  (PRN):  acetaminophen     Tablet .. 975 milliGRAM(s) Oral every 6 hours PRN Temp greater or equal to 38C (100.4F), Mild Pain (1 - 3), Moderate Pain (4 - 6), Severe Pain (7 - 10)  sodium chloride 0.9% lock flush 10 milliLiter(s) IV Push every 1 hour PRN Pre/post blood products, medications, blood draw, and to maintain line patency      Daily Review:                              9.9    17.55 )-----------( 443      ( 06 Nov 2024 10:11 )             30.3   11-06    138  |  99  |  14.6  ----------------------------<  126[H]  3.8   |  28.0  |  0.94    Ca    8.1[L]      06 Nov 2024 10:11  Mg     2.1     11-06            T(C): 37.1 (11-07-24 @ 02:30), Max: 38 (11-07-24 @ 00:02)  HR: 82 (11-07-24 @ 00:02) (76 - 84)  BP: 161/81 (11-07-24 @ 00:02) (115/70 - 172/76)  RR: 17 (11-07-24 @ 00:03) (16 - 18)  SpO2: 95% (11-07-24 @ 00:03) (82% - 99%)  Wt(kg): --    CAPILLARY BLOOD GLUCOSE          I&O's Summary    05 Nov 2024 07:01  -  06 Nov 2024 07:00  --------------------------------------------------------  IN: 600 mL / OUT: 0 mL / NET: 600 mL    06 Nov 2024 07:01  -  07 Nov 2024 04:04  --------------------------------------------------------  IN: 120 mL / OUT: 900 mL / NET: -780 mL          PHYSICAL EXAM:  GENERAL: No acute distress  NECK: no JVD  CHEST/LUNG: CTAB; No wheezes, rales, or rhonchi  HEART: RRR; + murmur. No rubs, or gallops. MSI wound edges approximated, + staples. Right axillary staples   ABDOMEN: Soft, non-tender, non-distended; normal bowel sounds  EXTREMITIES:  2+ peripheral pulses b/l, No clubbing, cyanosis, or edema  NEUROLOGY: AAO x 4

## 2024-11-07 NOTE — PROGRESS NOTE ADULT - SUBJECTIVE AND OBJECTIVE BOX
CC: Patient being seen for rehabilitation follow up.    Patient seen ad examined this morning with wife  present. He has no new complaints. i explained his current functional and neurologic deficits and explained plan for IRF which they are in agreement with.     FUNCTIONAL PROGRESS   PT    Sit-Stand Transfer Training  Transfer Training Sit-to-Stand Transfer: minimum assist (75% patient effort);  1 person assist  Transfer Training Stand-to-Sit Transfer: minimum assist (75% patient effort);  1 person assist  Sit-to-Stand Transfer Training Transfer Safety Analysis: impaired balance;  impaired coordination    Gait Training  Gait Training: minimum assist (75% patient effort);  1 person assist;  rolling walker;  40ft  Gait Analysis: impaired balance;  impaired coordination;  L side neglect       OT  Bed Mobility  Bed Mobility Training Sit-to-Supine: minimum assist (75% patient effort);  1 person assist;  nonverbal cues (demo/gestures);  verbal cues  Bed Mobility Training Supine-to-Sit: minimum assist (75% patient effort);  1 person assist;  nonverbal cues (demo/gestures);  verbal cues  Bed Mobility Training Limitations: decreased strength;  impaired balance;  cognitive, decreased safety awareness    Sit-Stand Transfer Training  Transfer Training Sit-to-Stand Transfer: minimum assist (75% patient effort);  1 person assist;  nonverbal cues (demo/gestures);  verbal cues;  weight-bearing as tolerated   rolling walker;  Cues for sequencing of movement and for safety for proper hand placement prior to/during transfer- pt with poor carryover of safety technique  Transfer Training Stand-to-Sit Transfer: minimum assist (75% patient effort);  1 person assist;  nonverbal cues (demo/gestures);  verbal cues;  weight-bearing as tolerated   rolling walker  Sit-to-Stand Transfer Training Transfer Safety Analysis: decreased weight-shifting ability;  decreased balance;  decreased cognition;  decreased strength;  impaired balance;  cognitive, decreased safety awareness;  rolling walker    Toilet Transfer Training  Transfer Training Toilet Transfer: minimum assist (75% patient effort);  1 person assist;  nonverbal cues (demo/gestures);  verbal cues;  low toilet - Cues for sequencing of movement and for safety for proper hand placement prior to/during transfer with sternal precautions ;  weight-bearing as tolerated   rolling walker  Toilet Transfer Training Transfer Safety Analysis: decreased weight-shifting ability;  decreased balance;  decreased strength;  impaired balance;  cognitive, decreased safety awareness;  rolling walker    Functional Endurance  Functional Endurance Detail: Pt ambulated with RW and min A x 1, +external cues short distances around bed area due to decreased balance and strength. Pt with L inattention, cues to scan environment for obstacles. Pt impulsive with impaired safety awareness and decreased carryover.       VITALS  T(C): 36.9 (24 @ 07:37), Max: 37.3 (24 @ 16:25)  HR: 84 (24 @ 07:37) (73 - 91)  BP: 172/76 (24 @ 07:37) (116/74 - 172/76)  RR: 18 (24 @ 07:37) (18 - 18)  SpO2: 92% (24 @ 07:37) (92% - 98%)  Wt(kg): --    MEDICATIONS   acetaminophen     Tablet .. 975 milliGRAM(s) every 6 hours PRN  aspirin  chewable 81 milliGRAM(s) daily  chlorhexidine 2% Cloths 1 Application(s) daily  cloNIDine 0.1 milliGRAM(s) three times a day  enoxaparin Injectable 40 milliGRAM(s) every 24 hours  furosemide    Tablet 40 milliGRAM(s) daily  levETIRAcetam 1000 milliGRAM(s) two times a day  methocarbamol 750 milliGRAM(s) three times a day  metoprolol tartrate 50 milliGRAM(s) two times a day  pantoprazole    Tablet 40 milliGRAM(s) before breakfast  sodium chloride 0.9% lock flush 10 milliLiter(s) every 1 hour PRN      RECENT LABS/IMAGING  - Reviewed Today                        10.7   21.02 )-----------( 356      ( 2024 07:20 )             32.1         139  |  101  |  17.5  ----------------------------<  104[H]  3.6   |  26.0  |  0.79    Ca    7.9[L]      2024 07:20  Mg     2.2             Urinalysis Basic - ( 2024 18:30 )    Color: Yellow / Appearance: Clear / S.016 / pH: x  Gluc: x / Ketone: Negative mg/dL  / Bili: Negative / Urobili: 1.0 mg/dL   Blood: x / Protein: Trace mg/dL / Nitrite: Negative   Leuk Esterase: Negative / RBC: 0 /HPF / WBC 1 /HPF   Sq Epi: x / Non Sq Epi: 0 /HPF / Bacteria: Negative /HPF              ----------------------------------------------------------------------------------------  PHYSICAL EXAM  Constitutional - NAD, Comfortable   Extremities - Diffuse swelling  Neurologic Exam -                    Cognitive - AAO to self, place, date, year, situation     Communication - Delayed processing, Mild dysarthria, Word initiation/finding difficulties      Cranial Nerves - No lip droop     FUNCTIONAL MOTOR EXAM - Left hemiparesis, Left inattention                    LEFT    UE - ShAB 3/5, EF 4/5, EE 3/5, WE 4/5,  5/5                    RIGHT UE - ShAB 4/5, EF 4/5, EE 4/5, WE 5/5,  5/5                    LEFT    LE - HF 3/5, KE 4/5, DF 3/5, PF 4/5                    RIGHT LE - HF 4/5, KE 4/5, DF 5/5, PF 5/5        Sensory - Intact to LT   Psychiatric - Mood stable, Affect Flat  ----------------------------------------------------------------------------------------  ASSESSMENT/PLAN  39yM with functional deficits related to aortic dissection s/p repair with post-op CVA    #Aortic Dissection s/p repair, HTN, CHF  - Continue management with CT Surgery  - Continue lasix, ASA, metoprolol    #Seizures   -Continue Keppra  -Continue PT/OT  -Consult SLP for mild cognitive delay and difficulty with speech initiation      Pain - Tylenol  DVT PPX - SCDs, Lovenox    Rehab/Impaired mobility and function - Patient continues to require hospitalization for the above diagnoses and ongoing active management of comorbid complications that are substantially posing a threat to bodily function, functional ability and quality of life, necessitating transfer of hospitalization at an acute inpatient rehabilitation facility.     When medically optimized, based on the patient's diagnosis, current functional status and potential for progress, recommend ACUTE inpatient rehabilitation for the functional deficits consisting of 3 hours of therapy/day & 24 hour RN/daily PMR physician for active comorbid medical management. Patient will be able to tolerate 3 hours a day.    Goals for acute inpatient rehab are to achieve modified independence with bed mobility, modified independence with transfers and modified independence with ambulation of 100' over an LOS of 21 days.    Will continue to follow. Rehab recommendations are dependent on how functional progress changes as well as how patient continues to participate and tolerate therapeutic interventions, WHICH MAY CHANGE UPON FOLLOW UP EVALUATIONS. Recommend ongoing mobilization by staff to maintain cardiopulmonary function and prevention of secondary complications related to debility. Have discussed the specific rehabilitation management and recommendations above with rehab clinical care team/rehab liaison.      Total Time Spent on Encounter (reviewing clinical notes, labs, radiology, medications, patient history/exam, assessment and plan) - 50 minutes       CC: Patient being seen for rehabilitation follow up.    Patient seen ad examined this morning with wife  present. He has no new complaints. i explained his current functional and neurologic deficits and explained plan for IRF which they are in agreement with.     FUNCTIONAL PROGRESS   PT    Sit-Stand Transfer Training  Transfer Training Sit-to-Stand Transfer: minimum assist (75% patient effort);  1 person assist  Transfer Training Stand-to-Sit Transfer: minimum assist (75% patient effort);  1 person assist  Sit-to-Stand Transfer Training Transfer Safety Analysis: impaired balance;  impaired coordination    Gait Training  Gait Training: minimum assist (75% patient effort);  1 person assist;  rolling walker;  40ft  Gait Analysis: impaired balance;  impaired coordination;  L side neglect       OT  Bed Mobility  Bed Mobility Training Sit-to-Supine: minimum assist (75% patient effort);  1 person assist;  nonverbal cues (demo/gestures);  verbal cues  Bed Mobility Training Supine-to-Sit: minimum assist (75% patient effort);  1 person assist;  nonverbal cues (demo/gestures);  verbal cues  Bed Mobility Training Limitations: decreased strength;  impaired balance;  cognitive, decreased safety awareness    Sit-Stand Transfer Training  Transfer Training Sit-to-Stand Transfer: minimum assist (75% patient effort);  1 person assist;  nonverbal cues (demo/gestures);  verbal cues;  weight-bearing as tolerated   rolling walker;  Cues for sequencing of movement and for safety for proper hand placement prior to/during transfer- pt with poor carryover of safety technique  Transfer Training Stand-to-Sit Transfer: minimum assist (75% patient effort);  1 person assist;  nonverbal cues (demo/gestures);  verbal cues;  weight-bearing as tolerated   rolling walker  Sit-to-Stand Transfer Training Transfer Safety Analysis: decreased weight-shifting ability;  decreased balance;  decreased cognition;  decreased strength;  impaired balance;  cognitive, decreased safety awareness;  rolling walker    Toilet Transfer Training  Transfer Training Toilet Transfer: minimum assist (75% patient effort);  1 person assist;  nonverbal cues (demo/gestures);  verbal cues;  low toilet - Cues for sequencing of movement and for safety for proper hand placement prior to/during transfer with sternal precautions ;  weight-bearing as tolerated   rolling walker  Toilet Transfer Training Transfer Safety Analysis: decreased weight-shifting ability;  decreased balance;  decreased strength;  impaired balance;  cognitive, decreased safety awareness;  rolling walker    Functional Endurance  Functional Endurance Detail: Pt ambulated with RW and min A x 1, +external cues short distances around bed area due to decreased balance and strength. Pt with L inattention, cues to scan environment for obstacles. Pt impulsive with impaired safety awareness and decreased carryover.       VITALS  T(C): 36.9 (24 @ 07:37), Max: 37.3 (24 @ 16:25)  HR: 84 (24 @ 07:37) (73 - 91)  BP: 172/76 (24 @ 07:37) (116/74 - 172/76)  RR: 18 (24 @ 07:37) (18 - 18)  SpO2: 92% (24 @ 07:37) (92% - 98%)  Wt(kg): --    MEDICATIONS   acetaminophen     Tablet .. 975 milliGRAM(s) every 6 hours PRN  aspirin  chewable 81 milliGRAM(s) daily  chlorhexidine 2% Cloths 1 Application(s) daily  cloNIDine 0.1 milliGRAM(s) three times a day  enoxaparin Injectable 40 milliGRAM(s) every 24 hours  furosemide    Tablet 40 milliGRAM(s) daily  levETIRAcetam 1000 milliGRAM(s) two times a day  methocarbamol 750 milliGRAM(s) three times a day  metoprolol tartrate 50 milliGRAM(s) two times a day  pantoprazole    Tablet 40 milliGRAM(s) before breakfast  sodium chloride 0.9% lock flush 10 milliLiter(s) every 1 hour PRN      RECENT LABS/IMAGING  - Reviewed Today                        10.7   21.02 )-----------( 356      ( 2024 07:20 )             32.1         139  |  101  |  17.5  ----------------------------<  104[H]  3.6   |  26.0  |  0.79    Ca    7.9[L]      2024 07:20  Mg     2.2             Urinalysis Basic - ( 2024 18:30 )    Color: Yellow / Appearance: Clear / S.016 / pH: x  Gluc: x / Ketone: Negative mg/dL  / Bili: Negative / Urobili: 1.0 mg/dL   Blood: x / Protein: Trace mg/dL / Nitrite: Negative   Leuk Esterase: Negative / RBC: 0 /HPF / WBC 1 /HPF   Sq Epi: x / Non Sq Epi: 0 /HPF / Bacteria: Negative /HPF              ----------------------------------------------------------------------------------------  PHYSICAL EXAM  Constitutional - NAD, Comfortable   Extremities - Diffuse swelling  Neurologic Exam -                    Cognitive - AAO to self, place, date, year, situation     Communication - Delayed processing, Mild dysarthria, Word initiation/finding difficulties      Cranial Nerves - No lip droop     FUNCTIONAL MOTOR EXAM - Left hemiparesis, Left inattention                    LEFT    UE - ShAB 3/5, EF 4/5, EE 3/5, WE 4/5,  5/5                    RIGHT UE - ShAB 4/5, EF 4/5, EE 4/5, WE 5/5,  5/5                    LEFT    LE - HF 3/5, KE 4/5, DF 3/5, PF 4/5                    RIGHT LE - HF 4/5, KE 4/5, DF 5/5, PF 5/5        Sensory - Intact to LT   Psychiatric - Mood stable, Affect Flat  ----------------------------------------------------------------------------------------  ASSESSMENT/PLAN  39yM with functional deficits related to aortic dissection s/p repair with post-op CVA    #Aortic Dissection s/p repair, HTN, CHF  - Continue management with CT Surgery  - Continue lasix, ASA, metoprolol    #Seizures   -Continue Keppra  -Continue PT/OT  -Consult placed for SLP for mild cognitive delay and difficulty with speech initiation    #Fever  -Blood cultures/Inf work up pending    Pain - Tylenol  DVT PPX - SCDs, Lovenox    Rehab/Impaired mobility and function - Patient continues to require hospitalization for the above diagnoses and ongoing active management of comorbid complications that are substantially posing a threat to bodily function, functional ability and quality of life, necessitating transfer of hospitalization at an acute inpatient rehabilitation facility.     When medically optimized, based on the patient's diagnosis, current functional status and potential for progress, recommend ACUTE inpatient rehabilitation for the functional deficits consisting of 3 hours of therapy/day & 24 hour RN/daily PMR physician for active comorbid medical management. Patient will be able to tolerate 3 hours a day.    Goals for acute inpatient rehab are to achieve modified independence with bed mobility, modified independence with transfers and modified independence with ambulation of 100' over an LOS of 21 days.    Will continue to follow. Rehab recommendations are dependent on how functional progress changes as well as how patient continues to participate and tolerate therapeutic interventions, WHICH MAY CHANGE UPON FOLLOW UP EVALUATIONS. Recommend ongoing mobilization by staff to maintain cardiopulmonary function and prevention of secondary complications related to debility. Have discussed the specific rehabilitation management and recommendations above with rehab clinical care team/rehab liaison.      Total Time Spent on Encounter (reviewing clinical notes, labs, radiology, medications, patient history/exam, assessment and plan) - 50 minutes       CC: Patient being seen for rehabilitation follow up.    Patient seen ad examined this morning with wife  present. He has no new complaints. i explained his current functional and neurologic deficits and explained plan for IRF which they are in agreement with.     FUNCTIONAL PROGRESS   PT    Sit-Stand Transfer Training  Transfer Training Sit-to-Stand Transfer: minimum assist (75% patient effort);  1 person assist  Transfer Training Stand-to-Sit Transfer: minimum assist (75% patient effort);  1 person assist  Sit-to-Stand Transfer Training Transfer Safety Analysis: impaired balance;  impaired coordination    Gait Training  Gait Training: minimum assist (75% patient effort);  1 person assist;  rolling walker;  40ft  Gait Analysis: impaired balance;  impaired coordination;  L side neglect       OT  Bed Mobility  Bed Mobility Training Sit-to-Supine: minimum assist (75% patient effort);  1 person assist;  nonverbal cues (demo/gestures);  verbal cues  Bed Mobility Training Supine-to-Sit: minimum assist (75% patient effort);  1 person assist;  nonverbal cues (demo/gestures);  verbal cues  Bed Mobility Training Limitations: decreased strength;  impaired balance;  cognitive, decreased safety awareness    Sit-Stand Transfer Training  Transfer Training Sit-to-Stand Transfer: minimum assist (75% patient effort);  1 person assist;  nonverbal cues (demo/gestures);  verbal cues;  weight-bearing as tolerated   rolling walker;  Cues for sequencing of movement and for safety for proper hand placement prior to/during transfer- pt with poor carryover of safety technique  Transfer Training Stand-to-Sit Transfer: minimum assist (75% patient effort);  1 person assist;  nonverbal cues (demo/gestures);  verbal cues;  weight-bearing as tolerated   rolling walker  Sit-to-Stand Transfer Training Transfer Safety Analysis: decreased weight-shifting ability;  decreased balance;  decreased cognition;  decreased strength;  impaired balance;  cognitive, decreased safety awareness;  rolling walker    Toilet Transfer Training  Transfer Training Toilet Transfer: minimum assist (75% patient effort);  1 person assist;  nonverbal cues (demo/gestures);  verbal cues;  low toilet - Cues for sequencing of movement and for safety for proper hand placement prior to/during transfer with sternal precautions ;  weight-bearing as tolerated   rolling walker  Toilet Transfer Training Transfer Safety Analysis: decreased weight-shifting ability;  decreased balance;  decreased strength;  impaired balance;  cognitive, decreased safety awareness;  rolling walker    Functional Endurance  Functional Endurance Detail: Pt ambulated with RW and min A x 1, +external cues short distances around bed area due to decreased balance and strength. Pt with L inattention, cues to scan environment for obstacles. Pt impulsive with impaired safety awareness and decreased carryover.       VITALS  T(C): 36.9 (24 @ 07:37), Max: 37.3 (24 @ 16:25)  HR: 84 (24 @ 07:37) (73 - 91)  BP: 172/76 (24 @ 07:37) (116/74 - 172/76)  RR: 18 (24 @ 07:37) (18 - 18)  SpO2: 92% (24 @ 07:37) (92% - 98%)  Wt(kg): --    MEDICATIONS   acetaminophen     Tablet .. 975 milliGRAM(s) every 6 hours PRN  aspirin  chewable 81 milliGRAM(s) daily  chlorhexidine 2% Cloths 1 Application(s) daily  cloNIDine 0.1 milliGRAM(s) three times a day  enoxaparin Injectable 40 milliGRAM(s) every 24 hours  furosemide    Tablet 40 milliGRAM(s) daily  levETIRAcetam 1000 milliGRAM(s) two times a day  methocarbamol 750 milliGRAM(s) three times a day  metoprolol tartrate 50 milliGRAM(s) two times a day  pantoprazole    Tablet 40 milliGRAM(s) before breakfast  sodium chloride 0.9% lock flush 10 milliLiter(s) every 1 hour PRN      RECENT LABS/IMAGING  - Reviewed Today                        10.7   21.02 )-----------( 356      ( 2024 07:20 )             32.1         139  |  101  |  17.5  ----------------------------<  104[H]  3.6   |  26.0  |  0.79    Ca    7.9[L]      2024 07:20  Mg     2.2             Urinalysis Basic - ( 2024 18:30 )    Color: Yellow / Appearance: Clear / S.016 / pH: x  Gluc: x / Ketone: Negative mg/dL  / Bili: Negative / Urobili: 1.0 mg/dL   Blood: x / Protein: Trace mg/dL / Nitrite: Negative   Leuk Esterase: Negative / RBC: 0 /HPF / WBC 1 /HPF   Sq Epi: x / Non Sq Epi: 0 /HPF / Bacteria: Negative /HPF              ----------------------------------------------------------------------------------------  PHYSICAL EXAM  Constitutional - NAD, Comfortable   Extremities - Diffuse swelling  Neurologic Exam -                    Cognitive - AAO to self, place, date, year, situation     Communication - Delayed processing, Mild dysarthria, Word initiation/finding difficulties      Cranial Nerves - No lip droop     FUNCTIONAL MOTOR EXAM - Left hemiparesis, Left inattention                    LEFT    UE - ShAB 3/5, EF 4/5, EE 3/5, WE 4/5,  5/5                    RIGHT UE - ShAB 4/5, EF 4/5, EE 4/5, WE 5/5,  5/5                    LEFT    LE - HF 3/5, KE 4/5, DF 3/5, PF 4/5                    RIGHT LE - HF 4/5, KE 4/5, DF 5/5, PF 5/5        Sensory - Intact to LT   Psychiatric - Mood stable, Affect Flat  ----------------------------------------------------------------------------------------  ASSESSMENT/PLAN  39yM with functional deficits related to aortic dissection s/p repair with post-op CVA    #Aortic Dissection s/p repair, HTN, CHF  - Continue management with CT Surgery  - Continue lasix, ASA, metoprolol    #Seizures   -Continue Keppra  -Continue PT/OT  -Consult placed for SLP for mild cognitive delay and difficulty with speech initiation    #Fever  -Blood cultures/Inf work up pending    Pain - Tylenol  DVT PPX - SCDs, Lovenox    UNCONTROLLED HTN - Please improve with oral medications - currently not optimized for AR

## 2024-11-08 DIAGNOSIS — R50.9 FEVER, UNSPECIFIED: ICD-10-CM

## 2024-11-08 LAB
ANION GAP SERPL CALC-SCNC: 13 MMOL/L — SIGNIFICANT CHANGE UP (ref 5–17)
BUN SERPL-MCNC: 12.8 MG/DL — SIGNIFICANT CHANGE UP (ref 8–20)
CALCIUM SERPL-MCNC: 8.1 MG/DL — LOW (ref 8.4–10.5)
CHLORIDE SERPL-SCNC: 100 MMOL/L — SIGNIFICANT CHANGE UP (ref 96–108)
CO2 SERPL-SCNC: 26 MMOL/L — SIGNIFICANT CHANGE UP (ref 22–29)
CREAT SERPL-MCNC: 0.89 MG/DL — SIGNIFICANT CHANGE UP (ref 0.5–1.3)
EGFR: 113 ML/MIN/1.73M2 — SIGNIFICANT CHANGE UP
GLUCOSE SERPL-MCNC: 101 MG/DL — HIGH (ref 70–99)
HCT VFR BLD CALC: 28.7 % — LOW (ref 39–50)
HGB BLD-MCNC: 9.6 G/DL — LOW (ref 13–17)
MAGNESIUM SERPL-MCNC: 2 MG/DL — SIGNIFICANT CHANGE UP (ref 1.6–2.6)
MCHC RBC-ENTMCNC: 28.6 PG — SIGNIFICANT CHANGE UP (ref 27–34)
MCHC RBC-ENTMCNC: 33.4 G/DL — SIGNIFICANT CHANGE UP (ref 32–36)
MCV RBC AUTO: 85.4 FL — SIGNIFICANT CHANGE UP (ref 80–100)
PLATELET # BLD AUTO: 479 K/UL — HIGH (ref 150–400)
POTASSIUM SERPL-MCNC: 3.9 MMOL/L — SIGNIFICANT CHANGE UP (ref 3.5–5.3)
POTASSIUM SERPL-SCNC: 3.9 MMOL/L — SIGNIFICANT CHANGE UP (ref 3.5–5.3)
RBC # BLD: 3.36 M/UL — LOW (ref 4.2–5.8)
RBC # FLD: 14.6 % — HIGH (ref 10.3–14.5)
SODIUM SERPL-SCNC: 139 MMOL/L — SIGNIFICANT CHANGE UP (ref 135–145)
WBC # BLD: 24.35 K/UL — HIGH (ref 3.8–10.5)
WBC # FLD AUTO: 24.35 K/UL — HIGH (ref 3.8–10.5)

## 2024-11-08 PROCEDURE — 99233 SBSQ HOSP IP/OBS HIGH 50: CPT | Mod: GC

## 2024-11-08 PROCEDURE — 71045 X-RAY EXAM CHEST 1 VIEW: CPT | Mod: 26

## 2024-11-08 PROCEDURE — 99024 POSTOP FOLLOW-UP VISIT: CPT

## 2024-11-08 RX ORDER — POTASSIUM CHLORIDE 10 MEQ
20 TABLET, EXTENDED RELEASE ORAL ONCE
Refills: 0 | Status: COMPLETED | OUTPATIENT
Start: 2024-11-08 | End: 2024-11-08

## 2024-11-08 RX ORDER — MAGNESIUM OXIDE 400 MG/1
400 TABLET ORAL ONCE
Refills: 0 | Status: COMPLETED | OUTPATIENT
Start: 2024-11-08 | End: 2024-11-08

## 2024-11-08 RX ADMIN — METHOCARBAMOL 750 MILLIGRAM(S): 500 TABLET ORAL at 05:21

## 2024-11-08 RX ADMIN — MAGNESIUM OXIDE 400 MILLIGRAM(S): 400 TABLET ORAL at 17:34

## 2024-11-08 RX ADMIN — Medication 50 MILLIGRAM(S): at 17:36

## 2024-11-08 RX ADMIN — Medication 40 MILLIGRAM(S): at 05:21

## 2024-11-08 RX ADMIN — LEVETIRACETAM 1000 MILLIGRAM(S): 500 TABLET, FILM COATED ORAL at 17:33

## 2024-11-08 RX ADMIN — Medication 20 MILLIEQUIVALENT(S): at 17:34

## 2024-11-08 RX ADMIN — CHLORHEXIDINE GLUCONATE 1 APPLICATION(S): 40 SOLUTION TOPICAL at 13:16

## 2024-11-08 RX ADMIN — CLONIDINE HYDROCHLORIDE 0.1 MILLIGRAM(S): 0.2 TABLET ORAL at 21:51

## 2024-11-08 RX ADMIN — PANTOPRAZOLE SODIUM 40 MILLIGRAM(S): 40 TABLET, DELAYED RELEASE ORAL at 05:21

## 2024-11-08 RX ADMIN — METHOCARBAMOL 750 MILLIGRAM(S): 500 TABLET ORAL at 22:24

## 2024-11-08 RX ADMIN — LEVETIRACETAM 1000 MILLIGRAM(S): 500 TABLET, FILM COATED ORAL at 05:22

## 2024-11-08 RX ADMIN — Medication 50 MILLIGRAM(S): at 05:21

## 2024-11-08 RX ADMIN — Medication 81 MILLIGRAM(S): at 13:13

## 2024-11-08 RX ADMIN — METHOCARBAMOL 750 MILLIGRAM(S): 500 TABLET ORAL at 13:17

## 2024-11-08 RX ADMIN — Medication 40 MILLIGRAM(S): at 21:51

## 2024-11-08 RX ADMIN — CLONIDINE HYDROCHLORIDE 0.1 MILLIGRAM(S): 0.2 TABLET ORAL at 14:21

## 2024-11-08 RX ADMIN — CLONIDINE HYDROCHLORIDE 0.1 MILLIGRAM(S): 0.2 TABLET ORAL at 05:21

## 2024-11-08 NOTE — PROGRESS NOTE ADULT - PROBLEM SELECTOR PLAN 3
on keppra   transitioned to PO from IV   cEEG structural abnormal, but no events  neuro following  Recommending MRI brain  Per Dr. Grimm, given pacing wires, will need to wait 6 weeks to get MRI. Neuro agreeable for it to be outpatient

## 2024-11-08 NOTE — PROGRESS NOTE ADULT - SUBJECTIVE AND OBJECTIVE BOX
CC: Patient being seen for rehabilitation follow up.    Patient seen ad examined this morning with wife  present. He has no new complaints. i explained his current functional and neurologic deficits and explained plan for IRF which they are in agreement with.     FUNCTIONAL PROGRESS  11/6 PT    Sit-Stand Transfer Training  Transfer Training Sit-to-Stand Transfer: minimum assist (75% patient effort);  1 person assist  Transfer Training Stand-to-Sit Transfer: minimum assist (75% patient effort);  1 person assist  Sit-to-Stand Transfer Training Transfer Safety Analysis: impaired balance;  impaired coordination    Gait Training  Gait Training: minimum assist (75% patient effort);  1 person assist;  rolling walker;  40ft  Gait Analysis: impaired balance;  impaired coordination;  L side neglect      11/6 OT  Bed Mobility  Bed Mobility Training Sit-to-Supine: minimum assist (75% patient effort);  1 person assist;  nonverbal cues (demo/gestures);  verbal cues  Bed Mobility Training Supine-to-Sit: minimum assist (75% patient effort);  1 person assist;  nonverbal cues (demo/gestures);  verbal cues  Bed Mobility Training Limitations: decreased strength;  impaired balance;  cognitive, decreased safety awareness    Sit-Stand Transfer Training  Transfer Training Sit-to-Stand Transfer: minimum assist (75% patient effort);  1 person assist;  nonverbal cues (demo/gestures);  verbal cues;  weight-bearing as tolerated   rolling walker;  Cues for sequencing of movement and for safety for proper hand placement prior to/during transfer- pt with poor carryover of safety technique  Transfer Training Stand-to-Sit Transfer: minimum assist (75% patient effort);  1 person assist;  nonverbal cues (demo/gestures);  verbal cues;  weight-bearing as tolerated   rolling walker  Sit-to-Stand Transfer Training Transfer Safety Analysis: decreased weight-shifting ability;  decreased balance;  decreased cognition;  decreased strength;  impaired balance;  cognitive, decreased safety awareness;  rolling walker    Toilet Transfer Training  Transfer Training Toilet Transfer: minimum assist (75% patient effort);  1 person assist;  nonverbal cues (demo/gestures);  verbal cues;  low toilet - Cues for sequencing of movement and for safety for proper hand placement prior to/during transfer with sternal precautions ;  weight-bearing as tolerated   rolling walker  Toilet Transfer Training Transfer Safety Analysis: decreased weight-shifting ability;  decreased balance;  decreased strength;  impaired balance;  cognitive, decreased safety awareness;  rolling walker    Functional Endurance  Functional Endurance Detail: Pt ambulated with RW and min A x 1, +external cues short distances around bed area due to decreased balance and strength. Pt with L inattention, cues to scan environment for obstacles. Pt impulsive with impaired safety awareness and decreased carryover.       VITALS  T(C): 36.6 (11-08-24 @ 05:04), Max: 38.6 (11-07-24 @ 12:15)  T(F): 97.8 (11-08-24 @ 05:04), Max: 101.4 (11-07-24 @ 12:15)  HR: 91 (11-08-24 @ 05:04) (78 - 93)  BP: 139/80 (11-08-24 @ 05:04) (104/66 - 161/52)  ABP: --  ABP(mean): --  RR: 18 (11-08-24 @ 05:04) (16 - 20)  SpO2: 96% (11-08-24 @ 05:04) (92% - 98%)      MEDICATIONS   MEDICATIONS  (STANDING):  aspirin  chewable 81 milliGRAM(s) Oral daily  chlorhexidine 2% Cloths 1 Application(s) Topical daily  cloNIDine 0.1 milliGRAM(s) Oral three times a day  enoxaparin Injectable 40 milliGRAM(s) SubCutaneous every 24 hours  furosemide    Tablet 40 milliGRAM(s) Oral daily  levETIRAcetam 1000 milliGRAM(s) Oral two times a day  methocarbamol 750 milliGRAM(s) Oral three times a day  metoprolol tartrate 50 milliGRAM(s) Oral two times a day  pantoprazole    Tablet 40 milliGRAM(s) Oral before breakfast    MEDICATIONS  (PRN):  acetaminophen     Tablet .. 975 milliGRAM(s) Oral every 6 hours PRN Temp greater or equal to 38C (100.4F), Mild Pain (1 - 3), Moderate Pain (4 - 6), Severe Pain (7 - 10)  sodium chloride 0.9% lock flush 10 milliLiter(s) IV Push every 1 hour PRN Pre/post blood products, medications, blood draw, and to maintain line patency        RECENT LABS/IMAGING  - Reviewed Today             LAB                        10.0   17.31 )-----------( 489      ( 07 Nov 2024 07:00 )             30.4     11-07    140  |  101  |  14.2  ----------------------------<  106[H]  4.0   |  28.0  |  0.86    Ca    8.2[L]      07 Nov 2024 07:00  Mg     2.2     11-07              Urinalysis Basic - ( 07 Nov 2024 07:00 )    Color: x / Appearance: x / SG: x / pH: x  Gluc: 106 mg/dL / Ketone: x  / Bili: x / Urobili: x   Blood: x / Protein: x / Nitrite: x   Leuk Esterase: x / RBC: x / WBC x   Sq Epi: x / Non Sq Epi: x / Bacteria: x    ----------------------------------------------------------------------------------------  PHYSICAL EXAM  Constitutional - NAD, Comfortable   Extremities - Diffuse swelling  Neurologic Exam -                    Cognitive - AAO to self, place, date, year, situation     Communication - Delayed processing, Mild dysarthria, Word initiation/finding difficulties      Cranial Nerves - No lip droop     FUNCTIONAL MOTOR EXAM - Left hemiparesis, Left inattention                    LEFT    UE - ShAB 3/5, EF 4/5, EE 3/5, WE 4/5,  5/5                    RIGHT UE - ShAB 4/5, EF 4/5, EE 4/5, WE 5/5,  5/5                    LEFT    LE - HF 3/5, KE 4/5, DF 3/5, PF 4/5                    RIGHT LE - HF 4/5, KE 4/5, DF 5/5, PF 5/5        Sensory - Intact to LT   Psychiatric - Mood stable, Affect Flat  ----------------------------------------------------------------------------------------  ASSESSMENT/PLAN  39yM with functional deficits related to aortic dissection s/p repair with post-op CVA    #Aortic Dissection s/p repair, HTN, CHF  - Continue management with CT Surgery  - Continue lasix, ASA, metoprolol    #Seizures   -Continue Keppra  -Continue PT/OT  -Consult placed for SLP for mild cognitive delay and difficulty with speech initiation    #Fever, 11/7  -Blood cultures NGTD at 72 hrs  - Potential ID consult  - would need 24 hrs without fever for IRF    Pain - Tylenol  DVT PPX - SCDs, Lovenox     CC: Patient being seen for rehabilitation follow up.    Patient seen ad examined this morning with wife  present. He has no new complaints. i explained his current functional and neurologic deficits and explained plan for IRF which they are in agreement with.     FUNCTIONAL PROGRESS  11/6 PT    Sit-Stand Transfer Training  Transfer Training Sit-to-Stand Transfer: minimum assist (75% patient effort);  1 person assist  Transfer Training Stand-to-Sit Transfer: minimum assist (75% patient effort);  1 person assist  Sit-to-Stand Transfer Training Transfer Safety Analysis: impaired balance;  impaired coordination    Gait Training  Gait Training: minimum assist (75% patient effort);  1 person assist;  rolling walker;  40ft  Gait Analysis: impaired balance;  impaired coordination;  L side neglect      11/6 OT  Bed Mobility  Bed Mobility Training Sit-to-Supine: minimum assist (75% patient effort);  1 person assist;  nonverbal cues (demo/gestures);  verbal cues  Bed Mobility Training Supine-to-Sit: minimum assist (75% patient effort);  1 person assist;  nonverbal cues (demo/gestures);  verbal cues  Bed Mobility Training Limitations: decreased strength;  impaired balance;  cognitive, decreased safety awareness    Sit-Stand Transfer Training  Transfer Training Sit-to-Stand Transfer: minimum assist (75% patient effort);  1 person assist;  nonverbal cues (demo/gestures);  verbal cues;  weight-bearing as tolerated   rolling walker;  Cues for sequencing of movement and for safety for proper hand placement prior to/during transfer- pt with poor carryover of safety technique  Transfer Training Stand-to-Sit Transfer: minimum assist (75% patient effort);  1 person assist;  nonverbal cues (demo/gestures);  verbal cues;  weight-bearing as tolerated   rolling walker  Sit-to-Stand Transfer Training Transfer Safety Analysis: decreased weight-shifting ability;  decreased balance;  decreased cognition;  decreased strength;  impaired balance;  cognitive, decreased safety awareness;  rolling walker    Toilet Transfer Training  Transfer Training Toilet Transfer: minimum assist (75% patient effort);  1 person assist;  nonverbal cues (demo/gestures);  verbal cues;  low toilet - Cues for sequencing of movement and for safety for proper hand placement prior to/during transfer with sternal precautions ;  weight-bearing as tolerated   rolling walker  Toilet Transfer Training Transfer Safety Analysis: decreased weight-shifting ability;  decreased balance;  decreased strength;  impaired balance;  cognitive, decreased safety awareness;  rolling walker    Functional Endurance  Functional Endurance Detail: Pt ambulated with RW and min A x 1, +external cues short distances around bed area due to decreased balance and strength. Pt with L inattention, cues to scan environment for obstacles. Pt impulsive with impaired safety awareness and decreased carryover.     SLP nori 11/8  Clinical Impression and Recommendations:   · Diagnosis	Moderate cognitive deficits as per MOCA (see below), impacting receptive & expressive language skills  · Patient/Family Goals Statement	none stated  · Criteria for Skilled Therapeutic Interventions Met	skilled criteria for intervention met  · Therapy Frequency	as schedule permits  · Comments	Speech tx services are RX following discharge    Behavioral Exam:   · Behavioral Observations	alert; distractible; confused  · Orientation	intact    Auditory Comprehension:   · Answers Yes/No Questions	within functional limits  · Able to Follow Commands	within functional limits  · Discourse	impaired  latent/delayed response  · Body Part ID	latent response  · Open Ended Questions	latent/delayed response    Verbal Expression:   · Automatic Speech	intact  · Confrontational Naming	intact  · Repetition	impaired; sentence; due to cognitive/recall deficits  · Fluency	impaired, due to reduced cognition  · Conversational Speech	Fluent, appropriate syntax    Cognitive Linguistic Status Examination:   · Pragmatics	flat affect  · Attention	impaired  · Short Term Memory	impaired  · Problem Solving	impaired  · Reasoning	impaired  · Diffuse Language Characteristics	delayed  · Executive Functions	impaired  · Executive Function Deficits Noted	initiation; insight/awareness  · Comments	Pt was given the Aamir Cognitive Assessment Tool (MOCA) which is a screening tool for individuals with mild cognitive dysfunction. The test assesses 8 domains of cognitive functioning: attention and concentration, executive functions, memory, language, visuoconstructional skills, conceptual thinking, calculations, and orientation.  Pt scored a 13/30. These scores indicate a moderate cognitive impairment.   "Normal" scoring is >26/30.   The following ranges may be used to grade severity: 18-26 = mild cognitive impairment, 10-17 = moderate cognitive impairment and less than 10 = severe cognitive impairment. However, research for these severity ranges has not been established yet. Pt achieved the following scores:   Visuospatial/executive: 1/5, naming: 3/3, delayed recall: 0/5, attention: 2/6, language: 1/3, abstraction: 0/2, orientation: 6/6    Motor Speech:   · Comments	WFL    VITALS  T(C): 36.6 (11-08-24 @ 05:04), Max: 38.6 (11-07-24 @ 12:15)  T(F): 97.8 (11-08-24 @ 05:04), Max: 101.4 (11-07-24 @ 12:15)  HR: 91 (11-08-24 @ 05:04) (78 - 93)  BP: 139/80 (11-08-24 @ 05:04) (104/66 - 161/52)  ABP: --  ABP(mean): --  RR: 18 (11-08-24 @ 05:04) (16 - 20)  SpO2: 96% (11-08-24 @ 05:04) (92% - 98%)      MEDICATIONS   MEDICATIONS  (STANDING):  aspirin  chewable 81 milliGRAM(s) Oral daily  chlorhexidine 2% Cloths 1 Application(s) Topical daily  cloNIDine 0.1 milliGRAM(s) Oral three times a day  enoxaparin Injectable 40 milliGRAM(s) SubCutaneous every 24 hours  furosemide    Tablet 40 milliGRAM(s) Oral daily  levETIRAcetam 1000 milliGRAM(s) Oral two times a day  methocarbamol 750 milliGRAM(s) Oral three times a day  metoprolol tartrate 50 milliGRAM(s) Oral two times a day  pantoprazole    Tablet 40 milliGRAM(s) Oral before breakfast    MEDICATIONS  (PRN):  acetaminophen     Tablet .. 975 milliGRAM(s) Oral every 6 hours PRN Temp greater or equal to 38C (100.4F), Mild Pain (1 - 3), Moderate Pain (4 - 6), Severe Pain (7 - 10)  sodium chloride 0.9% lock flush 10 milliLiter(s) IV Push every 1 hour PRN Pre/post blood products, medications, blood draw, and to maintain line patency        RECENT LABS/IMAGING  - Reviewed Today             LAB                        10.0   17.31 )-----------( 489      ( 07 Nov 2024 07:00 )             30.4     11-07    140  |  101  |  14.2  ----------------------------<  106[H]  4.0   |  28.0  |  0.86    Ca    8.2[L]      07 Nov 2024 07:00  Mg     2.2     11-07              Urinalysis Basic - ( 07 Nov 2024 07:00 )    Color: x / Appearance: x / SG: x / pH: x  Gluc: 106 mg/dL / Ketone: x  / Bili: x / Urobili: x   Blood: x / Protein: x / Nitrite: x   Leuk Esterase: x / RBC: x / WBC x   Sq Epi: x / Non Sq Epi: x / Bacteria: x    ----------------------------------------------------------------------------------------  PHYSICAL EXAM  Constitutional - NAD, Comfortable   Extremities - Diffuse swelling  Neurologic Exam -                    Cognitive - AAO to self, place, date, year, situation     Communication - Delayed processing, Mild dysarthria, Word initiation/finding difficulties      Cranial Nerves - No lip droop     FUNCTIONAL MOTOR EXAM - Left hemiparesis, Left inattention, apraxia                    LEFT    UE - ShAB 3/5, EF 4/5, EE 4/5, WE 4/5,  5/5                    RIGHT UE - ShAB 4/5, EF 4/5, EE 4/5, WE 5/5,  5/5                    LEFT    LE - HF 3/5, KE 4/5, DF 3/5, PF 4/5                    RIGHT LE - HF 4/5, KE 4/5, DF 5/5, PF 5/5        Sensory - Intact to LT   Psychiatric - Mood stable, Affect Flat  ----------------------------------------------------------------------------------------  ASSESSMENT/PLAN  39yM with functional deficits related to aortic dissection s/p repair with post-op CVA    #Aortic Dissection s/p repair, HTN, CHF  - Continue management with CT Surgery  - Continue lasix, ASA, metoprolol    #Seizures  #CVA   -Continue Keppra  -Continue PT/OT/SLP, most recent progress reviewed  -Would recommend updated MRI brain or CT Head to further characterize extend of brain injury and help guide therapy. These could be obtained at IRF if facility can preform.      #Fever, 11/7  -Blood cultures NGTD at 72 hrs  - Potential ID consult  - would need 24 hrs without fever for IRF    Pain - Tylenol  DVT PPX - SCDs, Lovenox  Discharge to IRF for acute rehab once medically stable.  CC: Patient being seen for rehabilitation follow up.    Patient seen ad examined this morning with wife  present. He has no new complaints. i explained his current functional and neurologic deficits and explained plan for IRF which they are in agreement with.     FUNCTIONAL PROGRESS  11/6 PT  Sit-Stand Transfer Training  Transfer Training Sit-to-Stand Transfer: minimum assist (75% patient effort);  1 person assist  Transfer Training Stand-to-Sit Transfer: minimum assist (75% patient effort);  1 person assist  Sit-to-Stand Transfer Training Transfer Safety Analysis: impaired balance;  impaired coordination    Gait Training  Gait Training: minimum assist (75% patient effort);  1 person assist;  rolling walker;  40ft  Gait Analysis: impaired balance;  impaired coordination;  L side neglect      11/6 OT  Bed Mobility  Bed Mobility Training Sit-to-Supine: minimum assist (75% patient effort);  1 person assist;  nonverbal cues (demo/gestures);  verbal cues  Bed Mobility Training Supine-to-Sit: minimum assist (75% patient effort);  1 person assist;  nonverbal cues (demo/gestures);  verbal cues  Bed Mobility Training Limitations: decreased strength;  impaired balance;  cognitive, decreased safety awareness    Sit-Stand Transfer Training  Transfer Training Sit-to-Stand Transfer: minimum assist (75% patient effort);  1 person assist;  nonverbal cues (demo/gestures);  verbal cues;  weight-bearing as tolerated   rolling walker;  Cues for sequencing of movement and for safety for proper hand placement prior to/during transfer- pt with poor carryover of safety technique  Transfer Training Stand-to-Sit Transfer: minimum assist (75% patient effort);  1 person assist;  nonverbal cues (demo/gestures);  verbal cues;  weight-bearing as tolerated   rolling walker  Sit-to-Stand Transfer Training Transfer Safety Analysis: decreased weight-shifting ability;  decreased balance;  decreased cognition;  decreased strength;  impaired balance;  cognitive, decreased safety awareness;  rolling walker    Toilet Transfer Training  Transfer Training Toilet Transfer: minimum assist (75% patient effort);  1 person assist;  nonverbal cues (demo/gestures);  verbal cues;  low toilet - Cues for sequencing of movement and for safety for proper hand placement prior to/during transfer with sternal precautions ;  weight-bearing as tolerated   rolling walker  Toilet Transfer Training Transfer Safety Analysis: decreased weight-shifting ability;  decreased balance;  decreased strength;  impaired balance;  cognitive, decreased safety awareness;  rolling walker    Functional Endurance  Functional Endurance Detail: Pt ambulated with RW and min A x 1, +external cues short distances around bed area due to decreased balance and strength. Pt with L inattention, cues to scan environment for obstacles. Pt impulsive with impaired safety awareness and decreased carryover.     SLP nori 11/8  Clinical Impression and Recommendations:   · Diagnosis	Moderate cognitive deficits as per MOCA (see below), impacting receptive & expressive language skills  · Patient/Family Goals Statement	none stated  · Criteria for Skilled Therapeutic Interventions Met	skilled criteria for intervention met  · Therapy Frequency	as schedule permits  · Comments	Speech tx services are RX following discharge    Behavioral Exam:   · Behavioral Observations	alert; distractible; confused  · Orientation	intact    Auditory Comprehension:   · Answers Yes/No Questions	within functional limits  · Able to Follow Commands	within functional limits  · Discourse	impaired  latent/delayed response  · Body Part ID	latent response  · Open Ended Questions	latent/delayed response    Verbal Expression:   · Automatic Speech	intact  · Confrontational Naming	intact  · Repetition	impaired; sentence; due to cognitive/recall deficits  · Fluency	impaired, due to reduced cognition  · Conversational Speech	Fluent, appropriate syntax    Cognitive Linguistic Status Examination:   · Pragmatics	flat affect  · Attention	impaired  · Short Term Memory	impaired  · Problem Solving	impaired  · Reasoning	impaired  · Diffuse Language Characteristics	delayed  · Executive Functions	impaired  · Executive Function Deficits Noted	initiation; insight/awareness  · Comments	Pt was given the Aamir Cognitive Assessment Tool (MOCA) which is a screening tool for individuals with mild cognitive dysfunction. The test assesses 8 domains of cognitive functioning: attention and concentration, executive functions, memory, language, visuoconstructional skills, conceptual thinking, calculations, and orientation.  Pt scored a 13/30. These scores indicate a moderate cognitive impairment.   "Normal" scoring is >26/30.   The following ranges may be used to grade severity: 18-26 = mild cognitive impairment, 10-17 = moderate cognitive impairment and less than 10 = severe cognitive impairment. However, research for these severity ranges has not been established yet. Pt achieved the following scores:   Visuospatial/executive: 1/5, naming: 3/3, delayed recall: 0/5, attention: 2/6, language: 1/3, abstraction: 0/2, orientation: 6/6    Motor Speech:   · Comments	WFL    VITALS  T(C): 36.6 (11-08-24 @ 05:04), Max: 38.6 (11-07-24 @ 12:15)  T(F): 97.8 (11-08-24 @ 05:04), Max: 101.4 (11-07-24 @ 12:15)  HR: 91 (11-08-24 @ 05:04) (78 - 93)  BP: 139/80 (11-08-24 @ 05:04) (104/66 - 161/52)  ABP: --  ABP(mean): --  RR: 18 (11-08-24 @ 05:04) (16 - 20)  SpO2: 96% (11-08-24 @ 05:04) (92% - 98%)      MEDICATIONS   MEDICATIONS  (STANDING):  aspirin  chewable 81 milliGRAM(s) Oral daily  chlorhexidine 2% Cloths 1 Application(s) Topical daily  cloNIDine 0.1 milliGRAM(s) Oral three times a day  enoxaparin Injectable 40 milliGRAM(s) SubCutaneous every 24 hours  furosemide    Tablet 40 milliGRAM(s) Oral daily  levETIRAcetam 1000 milliGRAM(s) Oral two times a day  methocarbamol 750 milliGRAM(s) Oral three times a day  metoprolol tartrate 50 milliGRAM(s) Oral two times a day  pantoprazole    Tablet 40 milliGRAM(s) Oral before breakfast    MEDICATIONS  (PRN):  acetaminophen     Tablet .. 975 milliGRAM(s) Oral every 6 hours PRN Temp greater or equal to 38C (100.4F), Mild Pain (1 - 3), Moderate Pain (4 - 6), Severe Pain (7 - 10)  sodium chloride 0.9% lock flush 10 milliLiter(s) IV Push every 1 hour PRN Pre/post blood products, medications, blood draw, and to maintain line patency        RECENT LABS/IMAGING  - Reviewed Today             LAB                        10.0   17.31 )-----------( 489      ( 07 Nov 2024 07:00 )             30.4     11-07    140  |  101  |  14.2  ----------------------------<  106[H]  4.0   |  28.0  |  0.86    Ca    8.2[L]      07 Nov 2024 07:00  Mg     2.2     11-07              Urinalysis Basic - ( 07 Nov 2024 07:00 )    Color: x / Appearance: x / SG: x / pH: x  Gluc: 106 mg/dL / Ketone: x  / Bili: x / Urobili: x   Blood: x / Protein: x / Nitrite: x   Leuk Esterase: x / RBC: x / WBC x   Sq Epi: x / Non Sq Epi: x / Bacteria: x    ----------------------------------------------------------------------------------------  PHYSICAL EXAM  Constitutional - NAD, Comfortable   Extremities - Diffuse swelling  Neurologic Exam -                    Cognitive - AAO to self, place, date, year, situation     Communication - Delayed processing, Mild dysarthria, Word initiation/finding difficulties      Cranial Nerves - No lip droop     FUNCTIONAL MOTOR EXAM - Left hemiparesis, Left inattention, apraxia                    LEFT    UE - ShAB 3/5, EF 4/5, EE 4/5, WE 4/5,  5/5                    RIGHT UE - ShAB 4/5, EF 4/5, EE 4/5, WE 5/5,  5/5                    LEFT    LE - HF 3/5, KE 4/5, DF 3/5, PF 4/5                    RIGHT LE - HF 4/5, KE 4/5, DF 5/5, PF 5/5        Sensory - Intact to LT   Psychiatric - Mood stable, Affect Flat  ----------------------------------------------------------------------------------------  ASSESSMENT/PLAN  39yM with functional deficits related to aortic dissection s/p repair with post-op CVA    #Aortic Dissection s/p repair, HTN, CHF  - Continue management with CT Surgery  - Continue lasix, ASA, metoprolol    #Seizures  # ACUTE CVAs  -Continue Keppra  -Continue PT/OT/SLP, most recent progress reviewed  -Would recommend updated MRI brain or CT Head to further characterize extend of brain injury and help guide therapy. These could be obtained at IRF if facility can preform.      #Fever, 11/7  -Blood cultures NGTD at 72 hrs  - Potential ID consult  - would need 24 hrs without fever for IRF    Pain - Tylenol  DVT PPX - SCDs, Lovenox  Discharge to IRF for acute rehab once medically stable.

## 2024-11-08 NOTE — SPEECH LANGUAGE PATHOLOGY EVALUATION - SLP DIAGNOSIS
Moderate cognitive deficits as per MOCA (see below), impacting receptive & expressive language skills

## 2024-11-08 NOTE — PROGRESS NOTE ADULT - SUBJECTIVE AND OBJECTIVE BOX
Subjective:   39yMale currently resting in bed with family at bedside. Denies chest pain, palpitations, SOB, HARO, cough, N/V/D/C.      PAST MEDICAL & SURGICAL HISTORY:  No pertinent past medical history      No pertinent past medical history      No significant past surgical history      No significant past surgical history          Medications:  acetaminophen     Tablet .. 975 milliGRAM(s) Oral every 6 hours PRN  aspirin  chewable 81 milliGRAM(s) Oral daily  chlorhexidine 2% Cloths 1 Application(s) Topical daily  cloNIDine 0.1 milliGRAM(s) Oral three times a day  enoxaparin Injectable 40 milliGRAM(s) SubCutaneous every 24 hours  furosemide    Tablet 40 milliGRAM(s) Oral daily  levETIRAcetam 1000 milliGRAM(s) Oral two times a day  methocarbamol 750 milliGRAM(s) Oral three times a day  metoprolol tartrate 50 milliGRAM(s) Oral two times a day  pantoprazole    Tablet 40 milliGRAM(s) Oral before breakfast  sodium chloride 0.9% lock flush 10 milliLiter(s) IV Push every 1 hour PRN      MEDICATIONS  (PRN):  acetaminophen     Tablet .. 975 milliGRAM(s) Oral every 6 hours PRN Temp greater or equal to 38C (100.4F), Mild Pain (1 - 3), Moderate Pain (4 - 6), Severe Pain (7 - 10)  sodium chloride 0.9% lock flush 10 milliLiter(s) IV Push every 1 hour PRN Pre/post blood products, medications, blood draw, and to maintain line patency      Daily Review:                              10.0   17.31 )-----------( 489      ( 07 Nov 2024 07:00 )             30.4   11-07    140  |  101  |  14.2  ----------------------------<  106[H]  4.0   |  28.0  |  0.86    Ca    8.2[L]      07 Nov 2024 07:00  Mg     2.2     11-07            T(C): 36.7 (11-08-24 @ 00:06), Max: 38.6 (11-07-24 @ 12:15)  HR: 93 (11-08-24 @ 00:06) (78 - 93)  BP: 118/66 (11-08-24 @ 00:06) (104/66 - 161/52)  RR: 18 (11-08-24 @ 00:06) (16 - 20)  SpO2: 98% (11-08-24 @ 00:06) (92% - 98%)  Wt(kg): --    CAPILLARY BLOOD GLUCOSE          I&O's Summary    06 Nov 2024 07:01  -  07 Nov 2024 07:00  --------------------------------------------------------  IN: 360 mL / OUT: 900 mL / NET: -540 mL    07 Nov 2024 07:01  -  08 Nov 2024 01:04  --------------------------------------------------------  IN: 120 mL / OUT: 250 mL / NET: -130 mL          PHYSICAL EXAM:  GENERAL: No acute distress  NECK: no JVD  CHEST/LUNG: CTAB; No wheezes, rales, or rhonchi  HEART: RRR; + murmur. No rubs, or gallops. MSI wound edges approximated, + staples. Right axillary staples   ABDOMEN: Soft, non-tender, non-distended; normal bowel sounds  EXTREMITIES:  2+ peripheral pulses b/l, No clubbing, cyanosis, or edema  NEUROLOGY: AAO x 4

## 2024-11-08 NOTE — SPEECH LANGUAGE PATHOLOGY EVALUATION - COMMENTS
Pt was given the [employer] Cognitive Assessment Tool (MOCA) which is a screening tool for individuals with mild cognitive dysfunction. The test assesses 8 domains of cognitive functioning: attention and concentration, executive functions, memory, language, visuoconstructional skills, conceptual thinking, calculations, and orientation.  Pt scored a 13/30. These scores indicate a moderate cognitive impairment.   "Normal" scoring is >26/30.   The following ranges may be used to grade severity: 18-26 = mild cognitive impairment, 10-17 = moderate cognitive impairment and less than 10 = severe cognitive impairment. However, research for these severity ranges has not been established yet. Pt achieved the following scores:   Visuospatial/executive: 1/5, naming: 3/3, delayed recall: 0/5, attention: 2/6, language: 1/3, abstraction: 0/2, orientation: 6/6 WFL Speech tx services are RX following discharge As per MD note: "40 y/o male with no pertinent past medical history presents to  with c/o chest tightness and pain, as per  chart patient described pain as a "squeezing" sensation across chest radiating down to leg originally was prescribed muscle relaxant in UC presented to  ED. Pt had CTA chest significant for Aortic dissection which is noted from the level of the root of the aorta extending into the ascending, aortic arch, descending thoracic aorta as well as extending into the abdominal aorta and is noted to end in the distal left common iliac artery. The dissection flap is in close proximity to the right coronary artery. The left renal artery is arising from the false lumen. Transferred to [facility] direct to OR for biobental with reconstruction of the RCA. Post op with hypertension and seizure (treated with IV ativan, propofol, keppra loaded). CT head/neck no acute findings. Neurology following. s/p cEEG without acute events. Overall, recovering well. Remains with left sided weakness. IV antihypertensives adjusted to PO regimen with appropriate BP limits. Dispo pending for acute rehab."

## 2024-11-09 LAB
ANION GAP SERPL CALC-SCNC: 14 MMOL/L — SIGNIFICANT CHANGE UP (ref 5–17)
BUN SERPL-MCNC: 14.3 MG/DL — SIGNIFICANT CHANGE UP (ref 8–20)
CALCIUM SERPL-MCNC: 8.3 MG/DL — LOW (ref 8.4–10.5)
CHLORIDE SERPL-SCNC: 102 MMOL/L — SIGNIFICANT CHANGE UP (ref 96–108)
CO2 SERPL-SCNC: 25 MMOL/L — SIGNIFICANT CHANGE UP (ref 22–29)
CREAT SERPL-MCNC: 0.95 MG/DL — SIGNIFICANT CHANGE UP (ref 0.5–1.3)
CULTURE RESULTS: SIGNIFICANT CHANGE UP
CULTURE RESULTS: SIGNIFICANT CHANGE UP
EGFR: 106 ML/MIN/1.73M2 — SIGNIFICANT CHANGE UP
GLUCOSE SERPL-MCNC: 106 MG/DL — HIGH (ref 70–99)
HCT VFR BLD CALC: 27.4 % — LOW (ref 39–50)
HGB BLD-MCNC: 9.1 G/DL — LOW (ref 13–17)
MAGNESIUM SERPL-MCNC: 2.2 MG/DL — SIGNIFICANT CHANGE UP (ref 1.8–2.6)
MCHC RBC-ENTMCNC: 28.6 PG — SIGNIFICANT CHANGE UP (ref 27–34)
MCHC RBC-ENTMCNC: 33.2 G/DL — SIGNIFICANT CHANGE UP (ref 32–36)
MCV RBC AUTO: 86.2 FL — SIGNIFICANT CHANGE UP (ref 80–100)
PLATELET # BLD AUTO: 495 K/UL — HIGH (ref 150–400)
POTASSIUM SERPL-MCNC: 4.4 MMOL/L — SIGNIFICANT CHANGE UP (ref 3.5–5.3)
POTASSIUM SERPL-SCNC: 4.4 MMOL/L — SIGNIFICANT CHANGE UP (ref 3.5–5.3)
RBC # BLD: 3.18 M/UL — LOW (ref 4.2–5.8)
RBC # FLD: 14.6 % — HIGH (ref 10.3–14.5)
SODIUM SERPL-SCNC: 141 MMOL/L — SIGNIFICANT CHANGE UP (ref 135–145)
SPECIMEN SOURCE: SIGNIFICANT CHANGE UP
SPECIMEN SOURCE: SIGNIFICANT CHANGE UP
WBC # BLD: 20.72 K/UL — HIGH (ref 3.8–10.5)
WBC # FLD AUTO: 20.72 K/UL — HIGH (ref 3.8–10.5)

## 2024-11-09 PROCEDURE — 99024 POSTOP FOLLOW-UP VISIT: CPT

## 2024-11-09 PROCEDURE — 71045 X-RAY EXAM CHEST 1 VIEW: CPT | Mod: 26

## 2024-11-09 RX ADMIN — Medication 50 MILLIGRAM(S): at 17:05

## 2024-11-09 RX ADMIN — LEVETIRACETAM 1000 MILLIGRAM(S): 500 TABLET, FILM COATED ORAL at 17:05

## 2024-11-09 RX ADMIN — METHOCARBAMOL 750 MILLIGRAM(S): 500 TABLET ORAL at 13:28

## 2024-11-09 RX ADMIN — CLONIDINE HYDROCHLORIDE 0.1 MILLIGRAM(S): 0.2 TABLET ORAL at 21:19

## 2024-11-09 RX ADMIN — Medication 40 MILLIGRAM(S): at 13:27

## 2024-11-09 RX ADMIN — METHOCARBAMOL 750 MILLIGRAM(S): 500 TABLET ORAL at 21:18

## 2024-11-09 RX ADMIN — PANTOPRAZOLE SODIUM 40 MILLIGRAM(S): 40 TABLET, DELAYED RELEASE ORAL at 05:27

## 2024-11-09 RX ADMIN — LEVETIRACETAM 1000 MILLIGRAM(S): 500 TABLET, FILM COATED ORAL at 05:26

## 2024-11-09 RX ADMIN — CLONIDINE HYDROCHLORIDE 0.1 MILLIGRAM(S): 0.2 TABLET ORAL at 05:26

## 2024-11-09 RX ADMIN — CHLORHEXIDINE GLUCONATE 1 APPLICATION(S): 40 SOLUTION TOPICAL at 13:31

## 2024-11-09 RX ADMIN — CLONIDINE HYDROCHLORIDE 0.1 MILLIGRAM(S): 0.2 TABLET ORAL at 13:27

## 2024-11-09 RX ADMIN — Medication 81 MILLIGRAM(S): at 13:28

## 2024-11-09 RX ADMIN — Medication 50 MILLIGRAM(S): at 05:25

## 2024-11-09 RX ADMIN — METHOCARBAMOL 750 MILLIGRAM(S): 500 TABLET ORAL at 05:26

## 2024-11-09 RX ADMIN — Medication 40 MILLIGRAM(S): at 05:25

## 2024-11-09 NOTE — PROGRESS NOTE ADULT - SUBJECTIVE AND OBJECTIVE BOX
Subjective:   39yMale resting comfortably in bed. No complaints at this time. Was up and walking around multiple times yesterday. Is feeling well. Denies chest pain, palpitations, SOB, HARO, cough, N/V/D/C.      PAST MEDICAL & SURGICAL HISTORY:  No pertinent past medical history      No pertinent past medical history      No significant past surgical history      No significant past surgical history          Medications:  acetaminophen     Tablet .. 975 milliGRAM(s) Oral every 6 hours PRN  aspirin  chewable 81 milliGRAM(s) Oral daily  chlorhexidine 2% Cloths 1 Application(s) Topical daily  cloNIDine 0.1 milliGRAM(s) Oral three times a day  enoxaparin Injectable 40 milliGRAM(s) SubCutaneous every 24 hours  furosemide    Tablet 40 milliGRAM(s) Oral daily  levETIRAcetam 1000 milliGRAM(s) Oral two times a day  methocarbamol 750 milliGRAM(s) Oral three times a day  metoprolol tartrate 50 milliGRAM(s) Oral two times a day  pantoprazole    Tablet 40 milliGRAM(s) Oral before breakfast  sodium chloride 0.9% lock flush 10 milliLiter(s) IV Push every 1 hour PRN      MEDICATIONS  (PRN):  acetaminophen     Tablet .. 975 milliGRAM(s) Oral every 6 hours PRN Temp greater or equal to 38C (100.4F), Mild Pain (1 - 3), Moderate Pain (4 - 6), Severe Pain (7 - 10)  sodium chloride 0.9% lock flush 10 milliLiter(s) IV Push every 1 hour PRN Pre/post blood products, medications, blood draw, and to maintain line patency      Daily Review:                              9.6    24.35 )-----------( 479      ( 08 Nov 2024 08:03 )             28.7   11-08    139  |  100  |  12.8  ----------------------------<  101[H]  3.9   |  26.0  |  0.89    Ca    8.1[L]      08 Nov 2024 08:03  Mg     2.0     11-08            T(C): 37 (11-09-24 @ 00:45), Max: 37.8 (11-08-24 @ 12:00)  HR: 75 (11-09-24 @ 00:45) (70 - 91)  BP: 147/75 (11-09-24 @ 00:45) (108/66 - 147/75)  RR: 18 (11-09-24 @ 00:45) (16 - 18)  SpO2: 97% (11-09-24 @ 00:45) (93% - 97%)  Wt(kg): --    CAPILLARY BLOOD GLUCOSE          I&O's Summary    07 Nov 2024 07:01  -  08 Nov 2024 07:00  --------------------------------------------------------  IN: 240 mL / OUT: 250 mL / NET: -10 mL    08 Nov 2024 07:01  -  09 Nov 2024 04:05  --------------------------------------------------------  IN: 0 mL / OUT: 1 mL / NET: -1 mL          PHYSICAL EXAM:  GENERAL: No acute distress  NECK: no JVD  CHEST/LUNG: CTAB; No wheezes, rales, or rhonchi  HEART: RRR; + murmur. No rubs, or gallops. MSI wound edges approximated, + staples. Right axillary staples   ABDOMEN: Soft, non-tender, non-distended; normal bowel sounds  EXTREMITIES:  2+ peripheral pulses b/l, No clubbing, cyanosis, or edema  NEUROLOGY: AAO x 4

## 2024-11-10 LAB
ANION GAP SERPL CALC-SCNC: 11 MMOL/L — SIGNIFICANT CHANGE UP (ref 5–17)
BUN SERPL-MCNC: 18.3 MG/DL — SIGNIFICANT CHANGE UP (ref 8–20)
CALCIUM SERPL-MCNC: 8.4 MG/DL — SIGNIFICANT CHANGE UP (ref 8.4–10.5)
CHLORIDE SERPL-SCNC: 101 MMOL/L — SIGNIFICANT CHANGE UP (ref 96–108)
CO2 SERPL-SCNC: 26 MMOL/L — SIGNIFICANT CHANGE UP (ref 22–29)
CREAT SERPL-MCNC: 1 MG/DL — SIGNIFICANT CHANGE UP (ref 0.5–1.3)
EGFR: 99 ML/MIN/1.73M2 — SIGNIFICANT CHANGE UP
GLUCOSE SERPL-MCNC: 109 MG/DL — HIGH (ref 70–99)
HCT VFR BLD CALC: 26 % — LOW (ref 39–50)
HGB BLD-MCNC: 8.8 G/DL — LOW (ref 13–17)
MAGNESIUM SERPL-MCNC: 2.2 MG/DL — SIGNIFICANT CHANGE UP (ref 1.8–2.6)
MCHC RBC-ENTMCNC: 29.1 PG — SIGNIFICANT CHANGE UP (ref 27–34)
MCHC RBC-ENTMCNC: 33.8 G/DL — SIGNIFICANT CHANGE UP (ref 32–36)
MCV RBC AUTO: 86.1 FL — SIGNIFICANT CHANGE UP (ref 80–100)
PLATELET # BLD AUTO: 531 K/UL — HIGH (ref 150–400)
POTASSIUM SERPL-MCNC: 4.2 MMOL/L — SIGNIFICANT CHANGE UP (ref 3.5–5.3)
POTASSIUM SERPL-SCNC: 4.2 MMOL/L — SIGNIFICANT CHANGE UP (ref 3.5–5.3)
RBC # BLD: 3.02 M/UL — LOW (ref 4.2–5.8)
RBC # FLD: 14.7 % — HIGH (ref 10.3–14.5)
SODIUM SERPL-SCNC: 138 MMOL/L — SIGNIFICANT CHANGE UP (ref 135–145)
WBC # BLD: 17.68 K/UL — HIGH (ref 3.8–10.5)
WBC # FLD AUTO: 17.68 K/UL — HIGH (ref 3.8–10.5)

## 2024-11-10 PROCEDURE — 99024 POSTOP FOLLOW-UP VISIT: CPT

## 2024-11-10 PROCEDURE — 71045 X-RAY EXAM CHEST 1 VIEW: CPT | Mod: 26

## 2024-11-10 RX ADMIN — METHOCARBAMOL 750 MILLIGRAM(S): 500 TABLET ORAL at 21:38

## 2024-11-10 RX ADMIN — LEVETIRACETAM 1000 MILLIGRAM(S): 500 TABLET, FILM COATED ORAL at 05:32

## 2024-11-10 RX ADMIN — CLONIDINE HYDROCHLORIDE 0.1 MILLIGRAM(S): 0.2 TABLET ORAL at 21:37

## 2024-11-10 RX ADMIN — Medication 40 MILLIGRAM(S): at 05:32

## 2024-11-10 RX ADMIN — Medication 50 MILLIGRAM(S): at 05:33

## 2024-11-10 RX ADMIN — CLONIDINE HYDROCHLORIDE 0.1 MILLIGRAM(S): 0.2 TABLET ORAL at 05:33

## 2024-11-10 RX ADMIN — Medication 81 MILLIGRAM(S): at 12:21

## 2024-11-10 RX ADMIN — PANTOPRAZOLE SODIUM 40 MILLIGRAM(S): 40 TABLET, DELAYED RELEASE ORAL at 05:33

## 2024-11-10 RX ADMIN — LEVETIRACETAM 1000 MILLIGRAM(S): 500 TABLET, FILM COATED ORAL at 17:10

## 2024-11-10 RX ADMIN — Medication 40 MILLIGRAM(S): at 12:21

## 2024-11-10 RX ADMIN — CLONIDINE HYDROCHLORIDE 0.1 MILLIGRAM(S): 0.2 TABLET ORAL at 14:30

## 2024-11-10 RX ADMIN — METHOCARBAMOL 750 MILLIGRAM(S): 500 TABLET ORAL at 14:30

## 2024-11-10 RX ADMIN — Medication 50 MILLIGRAM(S): at 17:11

## 2024-11-10 RX ADMIN — CHLORHEXIDINE GLUCONATE 1 APPLICATION(S): 40 SOLUTION TOPICAL at 12:22

## 2024-11-10 RX ADMIN — METHOCARBAMOL 750 MILLIGRAM(S): 500 TABLET ORAL at 05:32

## 2024-11-10 NOTE — PROGRESS NOTE ADULT - SUBJECTIVE AND OBJECTIVE BOX
BRIEF HOSPITAL COURSE  38 y/o male with no pertinent past medical history presents to  with c/o chest tightness and pain, as per  chart patient described pain as a "squeezing" sensation across chest radiating down to leg originally was prescribed muscle relaxant in UC presented to  ED. Pt had CTA chest significant for Aortic dissection which is noted from the level of the root of the aorta extending into the ascending, aortic arch, descending thoracic aorta as well as extending into the abdominal aorta and is noted to end in the distal left common iliac artery. The dissection flap is in close proximity to the right coronary artery. The left renal artery is arising from the false lumen. Transferred to [facility] direct to OR for biobental with reconstruction of the RCA. Post op with hypertension and seizure (treated with IV ativan, propofol, keppra loaded). CT head/neck no acute findings. Neurology following. s/p cEEG without acute events. IV antihypertensives adjusted to PO regimen with appropriate BP limits. Recurrent fevers with stable leukocytosis, BCx NGTD, lactate WNL, UA neg. No further fevers. Overall, recovering well. Repeat CT Head no acute findings (will need f/u MRI 6 weeks postop). Residual left-sided weakness/neglect improving. Patient ambulating and showering. Dispo pending for acute rehab.     SIGNIFICANT RECENT/PAST 24 HR EVENTS  No acute events reported overnight.     SUBJECTIVE  Patient seen and examined on follow up. Pt currently lying in bed in NAD. Denies fevers, chills, HA, dizziness, CP, SOB, abd pain, N/V/D, numbness/tingling in extremities, or any other acute complaints. States pain well controlled on current regimen.   +Tolerating diet  +Passing BMs since surgery   +Passing gas  +Ambulating during day  +Using incentive as instructed  +Showering  ROS negative x 10 systems except as noted above.    PAST MEDICAL & SURGICAL HISTORY  No pertinent past medical history  No significant past surgical history    DAILY REVIEW  Telemetry: Sinus rhythm   Vital Signs Last 24 Hrs  T(C): 36.9 (10 Nov 2024 00:00), Max: 37.2 (2024 17:01)  T(F): 98.5 (10 Nov 2024 00:00), Max: 98.9 (2024 17:01)  HR: 78 (10 Nov 2024 00:00) (68 - 88)  BP: 132/65 (10 Nov 2024 00:00) (119/62 - 160/69)  BP(mean): --  RR: 18 (10 Nov 2024 00:00) (18 - 18)  SpO2: 98% (10 Nov 2024 00:00) (92% - 99%)    Parameters below as of 10 Nov 2024 00:00  Patient On (Oxygen Delivery Method): room air    I&O's Detail    2024 07:01  -  2024 07:00  --------------------------------------------------------  IN:    Oral Fluid: 120 mL  Total IN: 120 mL    OUT:    Voided (mL): 1 mL  Total OUT: 1 mL    Total NET: 119 mL    2024 07:01  -  10 Nov 2024 03:16  --------------------------------------------------------  IN:    Oral Fluid: 120 mL  Total IN: 120 mL    OUT:    Voided (mL): 1275 mL  Total OUT: 1275 mL    Total NET: -1155 mL    Last Bowel Movement: 2024 (24 @ 20:00)  Last Bowel Movement: 2024 (24 @ 08:00)  Last Bowel Movement: 2024 (24 @ 23:00)  Last Bowel Movement: 2024 (24 @ 07:45)  Last Bowel Movement: 2024 (24 @ 20:00)  Last Bowel Movement: 2024 (24 @ 08:00)  Last Bowel Movement: 2024 (24 @ 20:00)  Last Bowel Movement: 2024 (24 @ 08:00)  Last Bowel Movement: 2024 (24 @ 08:44)  Last Bowel Movement: 2024 (24 @ 01:30)  Last Bowel Movement: 2024 (24 @ 15:00)  Last Bowel Movement: 2024 (24 @ 08:00)  Last Bowel Movement: 2024 (24 @ 00:00)  Last Bowel Movement: 2024 (24 @ 12:00)    Daily     Daily Weight in k.1 (2024 06:00)  Admit Wt: Drug Dosing Weight  Height (cm): 170.2 (29 Oct 2024 23:45)  Weight (kg): 107 (29 Oct 2024 23:45)  BMI (kg/m2): 36.9 (29 Oct 2024 23:45)  BSA (m2): 2.17 (29 Oct 2024 23:45)    LABS                        9.1    20.72 )-----------( 495      ( 2024 04:06 )             27.4         141  |  102  |  14.3  ----------------------------<  106[H]  4.4   |  25.0  |  0.95    Ca    8.3[L]      2024 04:06  Mg     2.2         Culture - Blood (collected 24 @ 07:07)  Source: .Blood BLOOD  Preliminary Report (24 @ 14:01):    No growth at 48 Hours    Culture - Blood (collected 24 @ 07:00)  Source: .Blood BLOOD  Preliminary Report (24 @ 14:01):    No growth at 48 Hours    Culture - Blood (collected 24 @ 10:41)  Source: .Blood BLOOD  Final Report (24 @ 19:00):    No growth at 5 days    Culture - Blood (collected 24 @ 09:51)  Source: .Blood BLOOD  Final Report (24 @ 17:00):    No growth at 5 days    MEDICATIONS  Home Medications:  None    MEDICATIONS  (STANDING):  aspirin  chewable 81 milliGRAM(s) Oral daily  chlorhexidine 2% Cloths 1 Application(s) Topical daily  cloNIDine 0.1 milliGRAM(s) Oral three times a day  enoxaparin Injectable 40 milliGRAM(s) SubCutaneous every 24 hours  furosemide    Tablet 40 milliGRAM(s) Oral daily  levETIRAcetam 1000 milliGRAM(s) Oral two times a day  methocarbamol 750 milliGRAM(s) Oral three times a day  metoprolol tartrate 50 milliGRAM(s) Oral two times a day  pantoprazole    Tablet 40 milliGRAM(s) Oral before breakfast    MEDICATIONS  (PRN):  acetaminophen     Tablet .. 975 milliGRAM(s) Oral every 6 hours PRN Temp greater or equal to 38C (100.4F), Mild Pain (1 - 3), Moderate Pain (4 - 6), Severe Pain (7 - 10)  sodium chloride 0.9% lock flush 10 milliLiter(s) IV Push every 1 hour PRN Pre/post blood products, medications, blood draw, and to maintain line patency    ALLERGIES  Allergy Status Unknown  No Known Allergies    DIAGNOSTICS  All relevant and available laboratory results, radiology and medications reviewed.    PHYSICAL EXAM  Constitutional: NAD  Neck: supple, trachea midline. No JVD   Respiratory: Breath sounds diminished b/l lower fields to auscultation, no accessory muscle use noted. No wheezing, rales, or rhonchi noted b/l   Cardiovascular: Regular rate, regular rhythm, normal S1, S2; no murmurs or rub   Gastrointestinal: Soft, non-tender, non-distended, + bowel sounds   Extremities: GOSS x 4, 1+ peripheral edema, no cyanosis, no clubbing    Vascular: Equal and normal pulses: 2+ peripheral pulses throughout  Neurological: A+O x 3; speech clear and intact; no gross sensory deficits; no obvious focal deficits  Psychiatric: calm, cooperative  Skin: warm, dry, well perfused, no rashes   Tubes/Lines: LUE midline  Incision: MSI lower pole fat necrosis, no drainage; chest tubes sites healing well; rt axillary inc well approx

## 2024-11-11 ENCOUNTER — INPATIENT (INPATIENT)
Facility: HOSPITAL | Age: 39
LOS: 8 days | Discharge: HOME | DRG: 66 | End: 2024-11-20
Attending: PHYSICAL MEDICINE & REHABILITATION | Admitting: PHYSICAL MEDICINE & REHABILITATION
Payer: COMMERCIAL

## 2024-11-11 ENCOUNTER — TRANSCRIPTION ENCOUNTER (OUTPATIENT)
Age: 39
End: 2024-11-11

## 2024-11-11 VITALS
SYSTOLIC BLOOD PRESSURE: 119 MMHG | DIASTOLIC BLOOD PRESSURE: 57 MMHG | TEMPERATURE: 98 F | OXYGEN SATURATION: 96 % | HEART RATE: 69 BPM | RESPIRATION RATE: 17 BRPM

## 2024-11-11 VITALS
TEMPERATURE: 99 F | RESPIRATION RATE: 16 BRPM | OXYGEN SATURATION: 91 % | HEART RATE: 71 BPM | SYSTOLIC BLOOD PRESSURE: 126 MMHG | DIASTOLIC BLOOD PRESSURE: 69 MMHG | HEIGHT: 67 IN

## 2024-11-11 DIAGNOSIS — I63.9 CEREBRAL INFARCTION, UNSPECIFIED: ICD-10-CM

## 2024-11-11 LAB
ANION GAP SERPL CALC-SCNC: 9 MMOL/L — SIGNIFICANT CHANGE UP (ref 5–17)
BUN SERPL-MCNC: 14.6 MG/DL — SIGNIFICANT CHANGE UP (ref 8–20)
CALCIUM SERPL-MCNC: 8.6 MG/DL — SIGNIFICANT CHANGE UP (ref 8.4–10.5)
CHLORIDE SERPL-SCNC: 103 MMOL/L — SIGNIFICANT CHANGE UP (ref 96–108)
CO2 SERPL-SCNC: 27 MMOL/L — SIGNIFICANT CHANGE UP (ref 22–29)
CREAT SERPL-MCNC: 0.85 MG/DL — SIGNIFICANT CHANGE UP (ref 0.5–1.3)
EGFR: 115 ML/MIN/1.73M2 — SIGNIFICANT CHANGE UP
GLUCOSE SERPL-MCNC: 113 MG/DL — HIGH (ref 70–99)
HCT VFR BLD CALC: 28.3 % — LOW (ref 39–50)
HGB BLD-MCNC: 9.3 G/DL — LOW (ref 13–17)
MAGNESIUM SERPL-MCNC: 2.3 MG/DL — SIGNIFICANT CHANGE UP (ref 1.6–2.6)
MCHC RBC-ENTMCNC: 28.6 PG — SIGNIFICANT CHANGE UP (ref 27–34)
MCHC RBC-ENTMCNC: 32.9 G/DL — SIGNIFICANT CHANGE UP (ref 32–36)
MCV RBC AUTO: 87.1 FL — SIGNIFICANT CHANGE UP (ref 80–100)
PLATELET # BLD AUTO: 528 K/UL — HIGH (ref 150–400)
POTASSIUM SERPL-MCNC: 4.3 MMOL/L — SIGNIFICANT CHANGE UP (ref 3.5–5.3)
POTASSIUM SERPL-SCNC: 4.3 MMOL/L — SIGNIFICANT CHANGE UP (ref 3.5–5.3)
RBC # BLD: 3.25 M/UL — LOW (ref 4.2–5.8)
RBC # FLD: 14.6 % — HIGH (ref 10.3–14.5)
SODIUM SERPL-SCNC: 139 MMOL/L — SIGNIFICANT CHANGE UP (ref 135–145)
WBC # BLD: 16.47 K/UL — HIGH (ref 3.8–10.5)
WBC # FLD AUTO: 16.47 K/UL — HIGH (ref 3.8–10.5)

## 2024-11-11 PROCEDURE — 92523 SPEECH SOUND LANG COMPREHEN: CPT

## 2024-11-11 PROCEDURE — 95714 VEEG EA 12-26 HR UNMNTR: CPT

## 2024-11-11 PROCEDURE — 85014 HEMATOCRIT: CPT

## 2024-11-11 PROCEDURE — 86900 BLOOD TYPING SEROLOGIC ABO: CPT

## 2024-11-11 PROCEDURE — 94002 VENT MGMT INPAT INIT DAY: CPT

## 2024-11-11 PROCEDURE — 70496 CT ANGIOGRAPHY HEAD: CPT | Mod: MC

## 2024-11-11 PROCEDURE — 36430 TRANSFUSION BLD/BLD COMPNT: CPT

## 2024-11-11 PROCEDURE — 86850 RBC ANTIBODY SCREEN: CPT

## 2024-11-11 PROCEDURE — 97530 THERAPEUTIC ACTIVITIES: CPT

## 2024-11-11 PROCEDURE — 94760 N-INVAS EAR/PLS OXIMETRY 1: CPT

## 2024-11-11 PROCEDURE — 83605 ASSAY OF LACTIC ACID: CPT

## 2024-11-11 PROCEDURE — 84132 ASSAY OF SERUM POTASSIUM: CPT

## 2024-11-11 PROCEDURE — 83735 ASSAY OF MAGNESIUM: CPT

## 2024-11-11 PROCEDURE — 70450 CT HEAD/BRAIN W/O DYE: CPT | Mod: MC

## 2024-11-11 PROCEDURE — 97116 GAIT TRAINING THERAPY: CPT

## 2024-11-11 PROCEDURE — P9047: CPT

## 2024-11-11 PROCEDURE — 85610 PROTHROMBIN TIME: CPT

## 2024-11-11 PROCEDURE — 82962 GLUCOSE BLOOD TEST: CPT

## 2024-11-11 PROCEDURE — 84295 ASSAY OF SERUM SODIUM: CPT

## 2024-11-11 PROCEDURE — 82947 ASSAY GLUCOSE BLOOD QUANT: CPT

## 2024-11-11 PROCEDURE — 82553 CREATINE MB FRACTION: CPT

## 2024-11-11 PROCEDURE — 70498 CT ANGIOGRAPHY NECK: CPT | Mod: MC

## 2024-11-11 PROCEDURE — 31720 CLEARANCE OF AIRWAYS: CPT

## 2024-11-11 PROCEDURE — 87040 BLOOD CULTURE FOR BACTERIA: CPT

## 2024-11-11 PROCEDURE — 82803 BLOOD GASES ANY COMBINATION: CPT

## 2024-11-11 PROCEDURE — 94003 VENT MGMT INPAT SUBQ DAY: CPT

## 2024-11-11 PROCEDURE — 71045 X-RAY EXAM CHEST 1 VIEW: CPT

## 2024-11-11 PROCEDURE — 86891 AUTOLOGOUS BLOOD OP SALVAGE: CPT

## 2024-11-11 PROCEDURE — 85025 COMPLETE CBC W/AUTO DIFF WBC: CPT

## 2024-11-11 PROCEDURE — 88305 TISSUE EXAM BY PATHOLOGIST: CPT

## 2024-11-11 PROCEDURE — C1889: CPT

## 2024-11-11 PROCEDURE — 86923 COMPATIBILITY TEST ELECTRIC: CPT

## 2024-11-11 PROCEDURE — C1769: CPT

## 2024-11-11 PROCEDURE — 99024 POSTOP FOLLOW-UP VISIT: CPT

## 2024-11-11 PROCEDURE — P9100: CPT

## 2024-11-11 PROCEDURE — 85384 FIBRINOGEN ACTIVITY: CPT

## 2024-11-11 PROCEDURE — 82330 ASSAY OF CALCIUM: CPT

## 2024-11-11 PROCEDURE — P9012: CPT

## 2024-11-11 PROCEDURE — 85730 THROMBOPLASTIN TIME PARTIAL: CPT

## 2024-11-11 PROCEDURE — 81001 URINALYSIS AUTO W/SCOPE: CPT

## 2024-11-11 PROCEDURE — C1751: CPT

## 2024-11-11 PROCEDURE — 83036 HEMOGLOBIN GLYCOSYLATED A1C: CPT

## 2024-11-11 PROCEDURE — 99233 SBSQ HOSP IP/OBS HIGH 50: CPT | Mod: GC

## 2024-11-11 PROCEDURE — 97163 PT EVAL HIGH COMPLEX 45 MIN: CPT

## 2024-11-11 PROCEDURE — C1781: CPT

## 2024-11-11 PROCEDURE — 85018 HEMOGLOBIN: CPT

## 2024-11-11 PROCEDURE — C1894: CPT

## 2024-11-11 PROCEDURE — 71045 X-RAY EXAM CHEST 1 VIEW: CPT | Mod: 26

## 2024-11-11 PROCEDURE — 36415 COLL VENOUS BLD VENIPUNCTURE: CPT

## 2024-11-11 PROCEDURE — 95813 EEG EXTND MNTR 61-119 MIN: CPT

## 2024-11-11 PROCEDURE — 80048 BASIC METABOLIC PNL TOTAL CA: CPT

## 2024-11-11 PROCEDURE — 86965 POOLING BLOOD PLATELETS: CPT

## 2024-11-11 PROCEDURE — 82550 ASSAY OF CK (CPK): CPT

## 2024-11-11 PROCEDURE — 80053 COMPREHEN METABOLIC PANEL: CPT

## 2024-11-11 PROCEDURE — 84484 ASSAY OF TROPONIN QUANT: CPT

## 2024-11-11 PROCEDURE — 82435 ASSAY OF BLOOD CHLORIDE: CPT

## 2024-11-11 PROCEDURE — 86901 BLOOD TYPING SEROLOGIC RH(D): CPT

## 2024-11-11 PROCEDURE — P9037: CPT

## 2024-11-11 PROCEDURE — 85027 COMPLETE CBC AUTOMATED: CPT

## 2024-11-11 PROCEDURE — P9045: CPT

## 2024-11-11 PROCEDURE — 93005 ELECTROCARDIOGRAM TRACING: CPT

## 2024-11-11 PROCEDURE — 95700 EEG CONT REC W/VID EEG TECH: CPT

## 2024-11-11 RX ORDER — METOPROLOL TARTRATE 100 MG/1
50 TABLET, FILM COATED ORAL
Refills: 0 | Status: DISCONTINUED | OUTPATIENT
Start: 2024-11-11 | End: 2024-11-20

## 2024-11-11 RX ORDER — LEVETIRACETAM 1000 MG/1
1000 TABLET ORAL
Refills: 0 | Status: DISCONTINUED | OUTPATIENT
Start: 2024-11-11 | End: 2024-11-20

## 2024-11-11 RX ORDER — PANTOPRAZOLE SODIUM 40 MG/1
40 TABLET, DELAYED RELEASE ORAL
Refills: 0 | Status: DISCONTINUED | OUTPATIENT
Start: 2024-11-12 | End: 2024-11-20

## 2024-11-11 RX ORDER — METOPROLOL TARTRATE 50 MG
1 TABLET ORAL
Qty: 0 | Refills: 0 | DISCHARGE
Start: 2024-11-11

## 2024-11-11 RX ORDER — ASPIRIN/MAG CARB/ALUMINUM AMIN 325 MG
1 TABLET ORAL
Qty: 0 | Refills: 0 | DISCHARGE
Start: 2024-11-11

## 2024-11-11 RX ORDER — POLYETHYLENE GLYCOL 3350 17 G/17G
17 POWDER, FOR SOLUTION ORAL DAILY
Refills: 0 | Status: DISCONTINUED | OUTPATIENT
Start: 2024-11-12 | End: 2024-11-20

## 2024-11-11 RX ORDER — ACETAMINOPHEN 500MG 500 MG/1
975 TABLET, COATED ORAL EVERY 8 HOURS
Refills: 0 | Status: DISCONTINUED | OUTPATIENT
Start: 2024-11-11 | End: 2024-11-12

## 2024-11-11 RX ORDER — METHOCARBAMOL 500 MG/1
1 TABLET ORAL
Qty: 0 | Refills: 0 | DISCHARGE
Start: 2024-11-11

## 2024-11-11 RX ORDER — FUROSEMIDE 40 MG/1
40 TABLET ORAL DAILY
Refills: 0 | Status: DISCONTINUED | OUTPATIENT
Start: 2024-11-12 | End: 2024-11-20

## 2024-11-11 RX ORDER — POTASSIUM CHLORIDE 600 MG/1
20 TABLET, EXTENDED RELEASE ORAL DAILY
Refills: 0 | Status: DISCONTINUED | OUTPATIENT
Start: 2024-11-11 | End: 2024-11-20

## 2024-11-11 RX ORDER — CLONIDINE HYDROCHLORIDE 0.3 MG/1
0.1 TABLET ORAL THREE TIMES A DAY
Refills: 0 | Status: DISCONTINUED | OUTPATIENT
Start: 2024-11-11 | End: 2024-11-20

## 2024-11-11 RX ORDER — ENOXAPARIN SODIUM 30 MG/.3ML
40 INJECTION SUBCUTANEOUS EVERY 24 HOURS
Refills: 0 | Status: DISCONTINUED | OUTPATIENT
Start: 2024-11-11 | End: 2024-11-20

## 2024-11-11 RX ORDER — PANTOPRAZOLE SODIUM 40 MG/1
1 TABLET, DELAYED RELEASE ORAL
Qty: 0 | Refills: 0 | DISCHARGE
Start: 2024-11-11

## 2024-11-11 RX ORDER — METHOCARBAMOL 500 MG/1
750 TABLET, FILM COATED ORAL THREE TIMES A DAY
Refills: 0 | Status: DISCONTINUED | OUTPATIENT
Start: 2024-11-11 | End: 2024-11-13

## 2024-11-11 RX ORDER — ACETAMINOPHEN 500 MG
3 TABLET ORAL
Qty: 0 | Refills: 0 | DISCHARGE
Start: 2024-11-11

## 2024-11-11 RX ORDER — LEVETIRACETAM 500 MG/1
1 TABLET, FILM COATED ORAL
Qty: 0 | Refills: 0 | DISCHARGE
Start: 2024-11-11

## 2024-11-11 RX ORDER — CLONIDINE HYDROCHLORIDE 0.2 MG/1
1 TABLET ORAL
Qty: 0 | Refills: 0 | DISCHARGE
Start: 2024-11-11

## 2024-11-11 RX ORDER — ACETAMINOPHEN, DIPHENHYDRAMINE HCL, PHENYLEPHRINE HCL 325; 25; 5 MG/1; MG/1; MG/1
6 TABLET ORAL AT BEDTIME
Refills: 0 | Status: DISCONTINUED | OUTPATIENT
Start: 2024-11-11 | End: 2024-11-20

## 2024-11-11 RX ORDER — FUROSEMIDE 40 MG
1 TABLET ORAL
Qty: 0 | Refills: 0 | DISCHARGE
Start: 2024-11-11 | End: 2024-11-18

## 2024-11-11 RX ORDER — INFLUENZA VIRUS VACCINE 15; 15; 15; 15 UG/.5ML; UG/.5ML; UG/.5ML; UG/.5ML
0.5 SUSPENSION INTRAMUSCULAR ONCE
Refills: 0 | Status: DISCONTINUED | OUTPATIENT
Start: 2024-11-11 | End: 2024-11-20

## 2024-11-11 RX ORDER — ENOXAPARIN SODIUM 40MG/0.4ML
40 SYRINGE (ML) SUBCUTANEOUS
Qty: 0 | Refills: 0 | DISCHARGE
Start: 2024-11-11

## 2024-11-11 RX ORDER — SENNOSIDES 8.6 MG
2 TABLET ORAL AT BEDTIME
Refills: 0 | Status: DISCONTINUED | OUTPATIENT
Start: 2024-11-11 | End: 2024-11-20

## 2024-11-11 RX ADMIN — Medication 975 MILLIGRAM(S): at 13:06

## 2024-11-11 RX ADMIN — LEVETIRACETAM 1000 MILLIGRAM(S): 500 TABLET, FILM COATED ORAL at 05:07

## 2024-11-11 RX ADMIN — CLONIDINE HYDROCHLORIDE 0.1 MILLIGRAM(S): 0.3 TABLET ORAL at 22:29

## 2024-11-11 RX ADMIN — METOPROLOL TARTRATE 50 MILLIGRAM(S): 100 TABLET, FILM COATED ORAL at 18:29

## 2024-11-11 RX ADMIN — CLONIDINE HYDROCHLORIDE 0.1 MILLIGRAM(S): 0.2 TABLET ORAL at 05:08

## 2024-11-11 RX ADMIN — Medication 40 MILLIGRAM(S): at 05:07

## 2024-11-11 RX ADMIN — LEVETIRACETAM 1000 MILLIGRAM(S): 1000 TABLET ORAL at 18:29

## 2024-11-11 RX ADMIN — ENOXAPARIN SODIUM 40 MILLIGRAM(S): 30 INJECTION SUBCUTANEOUS at 22:28

## 2024-11-11 RX ADMIN — METHOCARBAMOL 750 MILLIGRAM(S): 500 TABLET, FILM COATED ORAL at 22:28

## 2024-11-11 RX ADMIN — Medication 81 MILLIGRAM(S): at 13:06

## 2024-11-11 RX ADMIN — PANTOPRAZOLE SODIUM 40 MILLIGRAM(S): 40 TABLET, DELAYED RELEASE ORAL at 05:07

## 2024-11-11 RX ADMIN — Medication 50 MILLIGRAM(S): at 05:07

## 2024-11-11 RX ADMIN — CLONIDINE HYDROCHLORIDE 0.1 MILLIGRAM(S): 0.2 TABLET ORAL at 13:06

## 2024-11-11 RX ADMIN — METHOCARBAMOL 750 MILLIGRAM(S): 500 TABLET ORAL at 13:06

## 2024-11-11 RX ADMIN — METHOCARBAMOL 750 MILLIGRAM(S): 500 TABLET ORAL at 05:07

## 2024-11-11 RX ADMIN — Medication 2 TABLET(S): at 22:29

## 2024-11-11 NOTE — CHART NOTE - NSCHARTNOTEFT_GEN_A_CORE
Patient s/p emergent bioprosthetic Bental with reconstruction of right coronary artery due to Type A dissection. Patient pending discharge to acute rehab today. Patient can tolerate 4 hours of physical therapy a day.

## 2024-11-11 NOTE — H&P ADULT - NSHPSOCIALHISTORY_GEN_ALL_CORE
SOCIAL HISTORY  Smoking - Denies  EtOH - Denies  Drugs - Denies    FUNCTIONAL HISTORY  Patient lives with his wife in a 1-story house with 2 steps to enter (no rail). basement but pt does not need to go down. wife works but is currently available to assist. PTA independent.       CURRENT FUNCTIONAL STATUS  - Bed Mobility: Min A; 1PA   - Transfers: Min A; 1PA   - Gait: Min A; 1PA; 40 feet with RW    - ADLs:  -Upper Body Dressing: Mod A; 1PA  -Lower Body Dressing: Max A; 1PA  -Toileting: Min A; 1PA   -Bathing: Mod-Max a; 1PA  -Eating: Min A; 1PA   -Grooming: Mod A; 1PA SOCIAL HISTORY  Smoking - Denies  EtOH - 1-2 beers/week  Drugs - Denies    FUNCTIONAL HISTORY  Patient lives with his wife in a 1-story house with 2 steps to enter (no rail). basement but pt does not need to go down. wife works but is currently available to assist. PTA independent.       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 basement but pt does not need to go down. wife works but is currently available to assist. PTA independent.       CURRENT FUNCTIONAL STATUS  - Bed Mobility: Min A; 1PA   - Transfers: Min A; 1PA   - Gait: Min A; 1PA; 40 feet with RW    - ADLs:  -Upper Body Dressing: Mod A; 1PA  -Lower Body Dressing: Max A; 1PA  -Toileting: Min A; 1PA   -Bathing: Mod-Max a; 1PA  -Eating: Min A; 1PA   -Grooming: Mod A; 1PA

## 2024-11-11 NOTE — DISCHARGE NOTE PROVIDER - NSDCMRMEDTOKEN_GEN_ALL_CORE_FT
acetaminophen 325 mg oral tablet: 3 tab(s) orally every 6 hours As needed Temp greater or equal to 38C (100.4F), Mild Pain (1 - 3), Moderate Pain (4 - 6), Severe Pain (7 - 10)  aspirin 81 mg oral tablet, chewable: 1 tab(s) orally once a day  cloNIDine 0.1 mg oral tablet: 1 tab(s) orally 3 times a day  enoxaparin: 40 milligram(s) subcutaneous every 24 hours  furosemide 40 mg oral tablet: 1 tab(s) orally once a day Take for 7 days  levETIRAcetam 1000 mg oral tablet: 1 tab(s) orally 2 times a day  methocarbamol 750 mg oral tablet: 1 tab(s) orally 3 times a day as needed for  muscle spasm  metoprolol tartrate 50 mg oral tablet: 1 tab(s) orally 2 times a day  pantoprazole 40 mg oral delayed release tablet: 1 tab(s) orally once a day (before a meal)  potassium chloride 20 mEq oral tablet, extended release: 2 tab(s) orally once a day Take for 7 days while taking Lasix. Stop if Lasix is stopped.

## 2024-11-11 NOTE — H&P ADULT - NSHPREVIEWOFSYSTEMS_GEN_ALL_CORE
REVIEW OF SYSTEMS  Constitutional: No fever, No Chills, No fatigue  HEENT: No eye pain, No visual disturbances, No difficulty hearing  Pulm: + nonproductive cough,  No shortness of breath  Cardio: No chest pain, No palpitations  GI:  No abdominal pain, No nausea, No vomiting, No diarrhea, No constipation LBM 11/11  : No dysuria, No frequency, No hematuria  Neuro: No headaches, No memory loss,  No loss of strength,  No numbness/tingling, No tremors, No dizziness/light headedness   Skin: No itching, No rashes, No lesions   Endo: No temperature intolerance  MSK: No joint pain, No joint swelling, No muscle pain, No Neck pain,  No back pain  Psych:  No depression, No anxiety Constitutional: No fever, No Chills, No fatigue  HEENT: No eye pain, No visual disturbances, No difficulty hearing  Pulm: + nonproductive cough,  No shortness of breath  Cardio: No chest pain, No palpitations  GI:  No abdominal pain, No nausea, No vomiting, No diarrhea, No constipation LBM 11/11  : No dysuria, No frequency, No hematuria  Neuro: No headaches, No memory loss,  No loss of strength,  No numbness/tingling, No tremors, No dizziness/light headedness   Skin: No itching, No rashes, No lesions   Endo: No temperature intolerance  MSK: No joint pain, No joint swelling, No muscle pain, No Neck pain,  No back pain  Psych:  No depression, No anxiety

## 2024-11-11 NOTE — PROGRESS NOTE ADULT - REASON FOR ADMISSION
Type A dissection

## 2024-11-11 NOTE — PROGRESS NOTE ADULT - SUBJECTIVE AND OBJECTIVE BOX
CC: Patient being seen for rehabilitation follow up.    Patient seen ad examined this morning with wife  present. He has no new complaints. i explained his current functional and neurologic deficits and explained plan for IRF which they are in agreement with.     FUNCTIONAL PROGRESS  11/6 PT  Sit-Stand Transfer Training  Transfer Training Sit-to-Stand Transfer: minimum assist (75% patient effort);  1 person assist  Transfer Training Stand-to-Sit Transfer: minimum assist (75% patient effort);  1 person assist  Sit-to-Stand Transfer Training Transfer Safety Analysis: impaired balance;  impaired coordination    Gait Training  Gait Training: minimum assist (75% patient effort);  1 person assist;  rolling walker;  40ft  Gait Analysis: impaired balance;  impaired coordination;  L side neglect      11/6 OT  Bed Mobility  Bed Mobility Training Sit-to-Supine: minimum assist (75% patient effort);  1 person assist;  nonverbal cues (demo/gestures);  verbal cues  Bed Mobility Training Supine-to-Sit: minimum assist (75% patient effort);  1 person assist;  nonverbal cues (demo/gestures);  verbal cues  Bed Mobility Training Limitations: decreased strength;  impaired balance;  cognitive, decreased safety awareness    Sit-Stand Transfer Training  Transfer Training Sit-to-Stand Transfer: minimum assist (75% patient effort);  1 person assist;  nonverbal cues (demo/gestures);  verbal cues;  weight-bearing as tolerated   rolling walker;  Cues for sequencing of movement and for safety for proper hand placement prior to/during transfer- pt with poor carryover of safety technique  Transfer Training Stand-to-Sit Transfer: minimum assist (75% patient effort);  1 person assist;  nonverbal cues (demo/gestures);  verbal cues;  weight-bearing as tolerated   rolling walker  Sit-to-Stand Transfer Training Transfer Safety Analysis: decreased weight-shifting ability;  decreased balance;  decreased cognition;  decreased strength;  impaired balance;  cognitive, decreased safety awareness;  rolling walker    Toilet Transfer Training  Transfer Training Toilet Transfer: minimum assist (75% patient effort);  1 person assist;  nonverbal cues (demo/gestures);  verbal cues;  low toilet - Cues for sequencing of movement and for safety for proper hand placement prior to/during transfer with sternal precautions ;  weight-bearing as tolerated   rolling walker  Toilet Transfer Training Transfer Safety Analysis: decreased weight-shifting ability;  decreased balance;  decreased strength;  impaired balance;  cognitive, decreased safety awareness;  rolling walker    Functional Endurance  Functional Endurance Detail: Pt ambulated with RW and min A x 1, +external cues short distances around bed area due to decreased balance and strength. Pt with L inattention, cues to scan environment for obstacles. Pt impulsive with impaired safety awareness and decreased carryover.     SLP nori 11/8  Clinical Impression and Recommendations:   · Diagnosis	Moderate cognitive deficits as per MOCA (see below), impacting receptive & expressive language skills  · Patient/Family Goals Statement	none stated  · Criteria for Skilled Therapeutic Interventions Met	skilled criteria for intervention met  · Therapy Frequency	as schedule permits  · Comments	Speech tx services are RX following discharge    Behavioral Exam:   · Behavioral Observations	alert; distractible; confused  · Orientation	intact    Auditory Comprehension:   · Answers Yes/No Questions	within functional limits  · Able to Follow Commands	within functional limits  · Discourse	impaired  latent/delayed response  · Body Part ID	latent response  · Open Ended Questions	latent/delayed response    Verbal Expression:   · Automatic Speech	intact  · Confrontational Naming	intact  · Repetition	impaired; sentence; due to cognitive/recall deficits  · Fluency	impaired, due to reduced cognition  · Conversational Speech	Fluent, appropriate syntax    Cognitive Linguistic Status Examination:   · Pragmatics	flat affect  · Attention	impaired  · Short Term Memory	impaired  · Problem Solving	impaired  · Reasoning	impaired  · Diffuse Language Characteristics	delayed  · Executive Functions	impaired  · Executive Function Deficits Noted	initiation; insight/awareness  · Comments	Pt was given the Aamir Cognitive Assessment Tool (MOCA) which is a screening tool for individuals with mild cognitive dysfunction. The test assesses 8 domains of cognitive functioning: attention and concentration, executive functions, memory, language, visuoconstructional skills, conceptual thinking, calculations, and orientation.  Pt scored a 13/30. These scores indicate a moderate cognitive impairment.   "Normal" scoring is >26/30.   The following ranges may be used to grade severity: 18-26 = mild cognitive impairment, 10-17 = moderate cognitive impairment and less than 10 = severe cognitive impairment. However, research for these severity ranges has not been established yet. Pt achieved the following scores:   Visuospatial/executive: 1/5, naming: 3/3, delayed recall: 0/5, attention: 2/6, language: 1/3, abstraction: 0/2, orientation: 6/6    Motor Speech:   · Comments	[facility]    VITALS  T(C): 37 (11-11-24 @ 05:04), Max: 37.7 (11-10-24 @ 20:10)  T(F): 98.6 (11-11-24 @ 05:04), Max: 99.8 (11-10-24 @ 20:10)  HR: 71 (11-11-24 @ 05:04) (65 - 88)  BP: 160/62 (11-11-24 @ 05:04) (109/67 - 160/62)  ABP: --  ABP(mean): --  RR: 18 (11-11-24 @ 05:04) (18 - 18)  SpO2: 95% (11-11-24 @ 05:04) (95% - 97%)        MEDICATIONS   MEDICATIONS  (STANDING):  aspirin  chewable 81 milliGRAM(s) Oral daily  chlorhexidine 2% Cloths 1 Application(s) Topical daily  cloNIDine 0.1 milliGRAM(s) Oral three times a day  enoxaparin Injectable 40 milliGRAM(s) SubCutaneous every 24 hours  furosemide    Tablet 40 milliGRAM(s) Oral daily  levETIRAcetam 1000 milliGRAM(s) Oral two times a day  methocarbamol 750 milliGRAM(s) Oral three times a day  metoprolol tartrate 50 milliGRAM(s) Oral two times a day  pantoprazole    Tablet 40 milliGRAM(s) Oral before breakfast    MEDICATIONS  (PRN):  acetaminophen     Tablet .. 975 milliGRAM(s) Oral every 6 hours PRN Temp greater or equal to 38C (100.4F), Mild Pain (1 - 3), Moderate Pain (4 - 6), Severe Pain (7 - 10)  sodium chloride 0.9% lock flush 10 milliLiter(s) IV Push every 1 hour PRN Pre/post blood products, medications, blood draw, and to maintain line patency          RECENT LABS/IMAGING  - Reviewed Today  LAB                        9.3    16.47 )-----------( 528      ( 11 Nov 2024 06:10 )             28.3     11-11    139  |  103  |  14.6  ----------------------------<  113[H]  4.3   |  27.0  |  0.85    Ca    8.6      11 Nov 2024 06:10  Mg     2.3     11-11              Urinalysis Basic - ( 11 Nov 2024 06:10 )    Color: x / Appearance: x / SG: x / pH: x  Gluc: 113 mg/dL / Ketone: x  / Bili: x / Urobili: x   Blood: x / Protein: x / Nitrite: x   Leuk Esterase: x / RBC: x / WBC x   Sq Epi: x / Non Sq Epi: x / Bacteria: x            ----------------------------------------------------------------------------------------  PHYSICAL EXAM  Constitutional - NAD, Comfortable   Extremities - Diffuse swelling  Neurologic Exam -                    Cognitive - AAO to self, place, date, year, situation     Communication - Delayed processing, Mild dysarthria, Word initiation/finding difficulties      Cranial Nerves - No lip droop     FUNCTIONAL MOTOR EXAM - Left hemiparesis, Left inattention, apraxia                    LEFT    UE - ShAB 3/5, EF 4/5, EE 4/5, WE 4/5,  5/5                    RIGHT UE - ShAB 4/5, EF 4/5, EE 4/5, WE 5/5,  5/5                    LEFT    LE - HF 3/5, KE 4/5, DF 3/5, PF 4/5                    RIGHT LE - HF 4/5, KE 4/5, DF 5/5, PF 5/5        Sensory - Intact to LT   Psychiatric - Mood stable, Affect Flat  ----------------------------------------------------------------------------------------  ASSESSMENT/PLAN  39yM with functional deficits related to aortic dissection s/p repair with post-op CVA    #Aortic Dissection s/p repair, HTN, CHF  - Continue management with CT Surgery  - Continue lasix, ASA, metoprolol    #Seizures  # ACUTE CVAs  -Continue Keppra  -Continue PT/OT/SLP, most recent progress reviewed  -Would recommend updated MRI brain or CT Head to further characterize extend of brain injury and help guide therapy. These could be obtained at IRF if facility can preform.      #Fever, 11/7  -Blood cultures NGTD at 72 hrs  - Potential ID consult  - would need 24 hrs without fever for IRF    Pain - Tylenol  DVT PPX - SCDs, Lovenox  Discharge to IRF for acute rehab once medically stable.  CC: Patient being seen for rehabilitation follow up.    Patient seen and examined this morning in his recliner chair. He has no new complaints today and had a restful weekend with family  visitations. He demonstrates improving LE strength. We discussed plan for DC and follow up care.       FUNCTIONAL PROGRESS  11/6 PT  Sit-Stand Transfer Training  Transfer Training Sit-to-Stand Transfer: minimum assist (75% patient effort);  1 person assist  Transfer Training Stand-to-Sit Transfer: minimum assist (75% patient effort);  1 person assist  Sit-to-Stand Transfer Training Transfer Safety Analysis: impaired balance;  impaired coordination    Gait Training  Gait Training: minimum assist (75% patient effort);  1 person assist;  rolling walker;  40ft  Gait Analysis: impaired balance;  impaired coordination;  L side neglect      11/6 OT  Bed Mobility  Bed Mobility Training Sit-to-Supine: minimum assist (75% patient effort);  1 person assist;  nonverbal cues (demo/gestures);  verbal cues  Bed Mobility Training Supine-to-Sit: minimum assist (75% patient effort);  1 person assist;  nonverbal cues (demo/gestures);  verbal cues  Bed Mobility Training Limitations: decreased strength;  impaired balance;  cognitive, decreased safety awareness    Sit-Stand Transfer Training  Transfer Training Sit-to-Stand Transfer: minimum assist (75% patient effort);  1 person assist;  nonverbal cues (demo/gestures);  verbal cues;  weight-bearing as tolerated   rolling walker;  Cues for sequencing of movement and for safety for proper hand placement prior to/during transfer- pt with poor carryover of safety technique  Transfer Training Stand-to-Sit Transfer: minimum assist (75% patient effort);  1 person assist;  nonverbal cues (demo/gestures);  verbal cues;  weight-bearing as tolerated   rolling walker  Sit-to-Stand Transfer Training Transfer Safety Analysis: decreased weight-shifting ability;  decreased balance;  decreased cognition;  decreased strength;  impaired balance;  cognitive, decreased safety awareness;  rolling walker    Toilet Transfer Training  Transfer Training Toilet Transfer: minimum assist (75% patient effort);  1 person assist;  nonverbal cues (demo/gestures);  verbal cues;  low toilet - Cues for sequencing of movement and for safety for proper hand placement prior to/during transfer with sternal precautions ;  weight-bearing as tolerated   rolling walker  Toilet Transfer Training Transfer Safety Analysis: decreased weight-shifting ability;  decreased balance;  decreased strength;  impaired balance;  cognitive, decreased safety awareness;  rolling walker    Functional Endurance  Functional Endurance Detail: Pt ambulated with RW and min A x 1, +external cues short distances around bed area due to decreased balance and strength. Pt with L inattention, cues to scan environment for obstacles. Pt impulsive with impaired safety awareness and decreased carryover.     SLP nori 11/8  Clinical Impression and Recommendations:   · Diagnosis	Moderate cognitive deficits as per MOCA (see below), impacting receptive & expressive language skills  · Patient/Family Goals Statement	none stated  · Criteria for Skilled Therapeutic Interventions Met	skilled criteria for intervention met  · Therapy Frequency	as schedule permits  · Comments	Speech tx services are RX following discharge    Behavioral Exam:   · Behavioral Observations	alert; distractible; confused  · Orientation	intact    Auditory Comprehension:   · Answers Yes/No Questions	within functional limits  · Able to Follow Commands	within functional limits  · Discourse	impaired  latent/delayed response  · Body Part ID	latent response  · Open Ended Questions	latent/delayed response    Verbal Expression:   · Automatic Speech	intact  · Confrontational Naming	intact  · Repetition	impaired; sentence; due to cognitive/recall deficits  · Fluency	impaired, due to reduced cognition  · Conversational Speech	Fluent, appropriate syntax    Cognitive Linguistic Status Examination:   · Pragmatics	flat affect  · Attention	impaired  · Short Term Memory	impaired  · Problem Solving	impaired  · Reasoning	impaired  · Diffuse Language Characteristics	delayed  · Executive Functions	impaired  · Executive Function Deficits Noted	initiation; insight/awareness  · Comments	Pt was given the Aamir Cognitive Assessment Tool (MOCA) which is a screening tool for individuals with mild cognitive dysfunction. The test assesses 8 domains of cognitive functioning: attention and concentration, executive functions, memory, language, visuoconstructional skills, conceptual thinking, calculations, and orientation.  Pt scored a 13/30. These scores indicate a moderate cognitive impairment.   "Normal" scoring is >26/30.   The following ranges may be used to grade severity: 18-26 = mild cognitive impairment, 10-17 = moderate cognitive impairment and less than 10 = severe cognitive impairment. However, research for these severity ranges has not been established yet. Pt achieved the following scores:   Visuospatial/executive: 1/5, naming: 3/3, delayed recall: 0/5, attention: 2/6, language: 1/3, abstraction: 0/2, orientation: 6/6    Motor Speech:   · Comments	[facility]    VITALS  T(C): 37 (11-11-24 @ 05:04), Max: 37.7 (11-10-24 @ 20:10)  T(F): 98.6 (11-11-24 @ 05:04), Max: 99.8 (11-10-24 @ 20:10)  HR: 71 (11-11-24 @ 05:04) (65 - 88)  BP: 160/62 (11-11-24 @ 05:04) (109/67 - 160/62)  ABP: --  ABP(mean): --  RR: 18 (11-11-24 @ 05:04) (18 - 18)  SpO2: 95% (11-11-24 @ 05:04) (95% - 97%)        MEDICATIONS   MEDICATIONS  (STANDING):  aspirin  chewable 81 milliGRAM(s) Oral daily  chlorhexidine 2% Cloths 1 Application(s) Topical daily  cloNIDine 0.1 milliGRAM(s) Oral three times a day  enoxaparin Injectable 40 milliGRAM(s) SubCutaneous every 24 hours  furosemide    Tablet 40 milliGRAM(s) Oral daily  levETIRAcetam 1000 milliGRAM(s) Oral two times a day  methocarbamol 750 milliGRAM(s) Oral three times a day  metoprolol tartrate 50 milliGRAM(s) Oral two times a day  pantoprazole    Tablet 40 milliGRAM(s) Oral before breakfast    MEDICATIONS  (PRN):  acetaminophen     Tablet .. 975 milliGRAM(s) Oral every 6 hours PRN Temp greater or equal to 38C (100.4F), Mild Pain (1 - 3), Moderate Pain (4 - 6), Severe Pain (7 - 10)  sodium chloride 0.9% lock flush 10 milliLiter(s) IV Push every 1 hour PRN Pre/post blood products, medications, blood draw, and to maintain line patency          RECENT LABS/IMAGING  - Reviewed Today  LAB                        9.3    16.47 )-----------( 528      ( 11 Nov 2024 06:10 )             28.3     11-11    139  |  103  |  14.6  ----------------------------<  113[H]  4.3   |  27.0  |  0.85    Ca    8.6      11 Nov 2024 06:10  Mg     2.3     11-11              Urinalysis Basic - ( 11 Nov 2024 06:10 )    Color: x / Appearance: x / SG: x / pH: x  Gluc: 113 mg/dL / Ketone: x  / Bili: x / Urobili: x   Blood: x / Protein: x / Nitrite: x   Leuk Esterase: x / RBC: x / WBC x   Sq Epi: x / Non Sq Epi: x / Bacteria: x            ----------------------------------------------------------------------------------------  PHYSICAL EXAM  Constitutional - NAD, Comfortable   Extremities - Mildly swollen  Neurologic Exam -                    Cognitive - AAO to self, place, date, year, situation     Communication - Delayed processing, Mild dysarthria, Word initiation/finding difficulties , some L sided neglect     Cranial Nerves - No lip droop     FUNCTIONAL MOTOR EXAM - Left hemiparesis, Left inattention, apraxia                    LEFT    UE - ShAB 3/5, EF 4/5, EE 4/5, WE 4/5,  5/5                    RIGHT UE - ShAB 4/5, EF 4/5, EE 4/5, WE 5/5,  5/5                    LEFT    LE - HF 4/5, KE 4/5, DF 3/5, PF 4/5                    RIGHT LE - HF 4/5, KE 4/5, DF 5/5, PF 5/5        Sensory - Intact to LT   Psychiatric - Mood stable, Affect Flat  ----------------------------------------------------------------------------------------  ASSESSMENT/PLAN  39yM with functional deficits related to aortic dissection s/p repair with post-op CVA    #Aortic Dissection s/p repair, HTN, CHF  - Continue management with CT Surgery  - Continue lasix, ASA, metoprolol    #Seizures  # ACUTE CVAs  -Continue Keppra  -Continue PT/OT/SLP, most recent progress reviewed  -CT image from last week reviewed; MRI in 6 weeks, no sooner due to pacer wires    #Fever  - Blood cultures NGTD at 72 hrs  - No need for ID consult per primary team note  - Afebrile last 48 hrs (Tmax 99.8)    Pain - Tylenol  DVT PPX - SCDs, Lovenox  Discharge to IRF for acute rehab once medically stable. Scheduled today

## 2024-11-11 NOTE — H&P ADULT - NS ATTEND AMEND GEN_ALL_CORE FT
I independently performed the documented the history, exam, and medical decision making. I have made amendments to the documentation where necessary. I have personally seen and examined the patient. Medical records were reviewed and I have made amendments to the documentation where necessary and adjusted the history, physical examination, and plan as documented by the Nurse Practitioner. Patient was seen and evaluated at bedside today. Reported no overnight events and is in no acute distress. Eager to participate on the recommended rehabilitation program. Denies any CP, SOB, HARO, palpitations, fever, chills, body aches, cough, congestion, or any other symptoms at this time. Admission vitals, labs, and physical exam are outlined below.    LAB                        9.7    16.76 )-----------( 537      ( 12 Nov 2024 05:50 )             29.9     11-12    137  |  101  |  13  ----------------------------<  118[H]  4.0   |  33[H]  |  1.08    Ca    8.9      12 Nov 2024 05:50  Mg     2.3     11-11    TPro  6.8  /  Alb  2.4[L]  /  TBili  0.6  /  DBili  x   /  AST  72[H]  /  ALT  257[H]  /  AlkPhos  218[H]  11-12    LIVER FUNCTIONS - ( 12 Nov 2024 05:50 )  Alb: 2.4 g/dL / Pro: 6.8 g/dL / ALK PHOS: 218 U/L / ALT: 257 U/L / AST: 72 U/L / GGT: x             PHYSICAL EXAM  Vital Signs Last 24 Hrs  T(C): 37.2 (11 Nov 2024 20:30), Max: 37.2 (11 Nov 2024 20:30)  T(F): 98.9 (11 Nov 2024 20:30), Max: 98.9 (11 Nov 2024 20:30)  HR: 72 (12 Nov 2024 13:52) (67 - 92)  BP: 127/73 (12 Nov 2024 13:52) (118/54 - 137/66)  RR: 17 (12 Nov 2024 13:52) (16 - 17)  SpO2: 93% (12 Nov 2024 13:52) (91% - 94%)

## 2024-11-11 NOTE — H&P ADULT - NSHPLABSRESULTS_GEN_ALL_CORE
Labs             9.3    16.47 )-----------( 528      ( 11 Nov 2024 06:10 )             28.3     11-11    139  |  103  |  14.6  ----------------------------<  113[H]  4.3   |  27.0  |  0.85    Ca    8.6      11 Nov 2024 06:10  Mg     2.3     11-11    Urinalysis Basic - ( 11 Nov 2024 06:10 )    Color: x / Appearance: x / SG: x / pH: x  Gluc: 113 mg/dL / Ketone: x  / Bili: x / Urobili: x   Blood: x / Protein: x / Nitrite: x   Leuk Esterase: x / RBC: x / WBC x   Sq Epi: x / Non Sq Epi: x / Bacteria: x    < from: CT Angio Chest Aorta w/wo IV Cont (10.29.24 @ 13:48) >    IMPRESSION: Aortic dissection beginning at the root of the aorta   extending into the ascending, aortic arch, descending thoracic aorta as   well as the abdominal aorta and ends in the distal left common iliac   artery.  < from: CT Angio Abdomen and Pelvis w/ IV Cont (10.29.24 @ 13:49) >    IMPRESSION: Aortic dissection beginning at the root of the aorta   extending into the ascending, aortic arch, descending thoracic aorta as   well as the abdominal aorta and ends in the distal left common iliac   artery.    < from: CT Angio Head w/ IV Cont (10.31.24 @ 10:42) >    IMPRESSION:    CT brain:  No hydrocephalus, acute intracranial hemorrhage, mass effect, or brain   edema.    CTA brain:  No flow-limiting stenosis or vascular aneurysm. No AVM.    Venous system is well opacified, no evidence for venoussinus or cortical   vein thrombosis.    CTA neck:  Status post repair of the proximal aspect of the previously seen left   aortic dissection. New edematous changes within the mediastinum. New   bibasilar atelectasis.    Residual false lumen supplies the left common carotid and subclavian   arteries.    No flow-limiting stenosis or vascular aneurysm.    < from: CT Head No Cont (11.06.24 @ 08:21) >    IMPRESSION:  No acute intracranial hemorrhage, mass effect, or midline shift.    < from: Xray Chest 1 View- PORTABLE-Routine (Xray Chest 1 View- PORTABLE-Routine in AM.) (11.06.24 @ 05:04) >      IMPRESSION:  No active pulmonary disease.    < from: Xray Chest 1 View- PORTABLE-Routine (Xray Chest 1 View- PORTABLE-Routine in AM.) (11.10.24 @ 05:06) >    IMPRESSION:    HEART:  Enlarged. Prosthetic aortic valve  LUNGS: Elevated right hemidiaphragm. clear lungs..  BONES: sternotomy wires

## 2024-11-11 NOTE — H&P ADULT - NSHPPHYSICALEXAM_GEN_ALL_CORE
PHYSICAL EXAM  VITALS  T(C): 36.9 (11-11-24 @ 15:25), Max: 37.7 (11-10-24 @ 20:10)  HR: 69 (11-11-24 @ 15:25) (65 - 76)  BP: 119/57 (11-11-24 @ 15:25) (105/60 - 160/62)  RR: 17 (11-11-24 @ 15:25) (17 - 18)  SpO2: 96% (11-11-24 @ 15:25) (95% - 98%)    Gen - NAD, Comfortable  HEENT - NCAT, EOMI, MMM, PERRLA, Normal Conjunctivae  Neck - Supple, No limited ROM  Pulm - CTAB, No wheeze, No rhonchi, No crackles  Cardiovascular - RRR, S1S2, No murmurs  Chest - good chest expansion, good respiratory effort  Abdomen - Soft, NT, +BS, Abd rounded   Extremities - No C/C, no calf tenderness, LLE edema >RLE  Neuro-     Cognitive - awake, alert, oriented to person, place, date, year, and situation.  Able  to follow command     Communication - Fluent, Comprehensible, No dysarthria, No aphasia      Attention: Intact, able to state days of week chronologically and backwards. Able to perform simple additions and subtractions     Memory: Recall 3 objects immediate and 3 min later,      Cranial Nerves -No facial asymmetry, Tongue midline, EOMI, Shoulder shrug intact     Motor -                     LEFT    UE - ShAB 5/5, EF 5/5, EE 5/5,  5/5                    RIGHT UE - ShAB 5/5, EF 5/5, EE 5/5,   5/5                    LEFT    LE - HF 5/5, KE 5/5, DF 5/5, PF 5/5                    RIGHT LE - HF 5/5, KE 5/5, DF 5/5, PF 5/5        Sensory - Intact  to LT      Coordination - FTN/HTS intact     Tone - Normal  Psychiatric - Mood stable, Affect WNL  Skin:  R anterior cervical neck wound 1x1cm, 25cm mid sternal incision , well approximated, with steri strips, 2 lap sitex under 1.5 cm each, R anterior shoulder incision 7cm , moisture associated dermatitis groin PHYSICAL EXAM  VITALS  T(C): 36.9 (11-11-24 @ 15:25), Max: 37.7 (11-10-24 @ 20:10)  HR: 69 (11-11-24 @ 15:25) (65 - 76)  BP: 119/57 (11-11-24 @ 15:25) (105/60 - 160/62)  RR: 17 (11-11-24 @ 15:25) (17 - 18)  SpO2: 96% (11-11-24 @ 15:25) (95% - 98%)    Gen - NAD, Comfortable  HEENT - NCAT, EOMI, MMM, PERRLA, Normal Conjunctivae  Neck - Supple, No limited ROM  Pulm - CTAB, No wheeze, No rhonchi, No crackles  Cardiovascular - RRR, S1S2, No murmurs  Chest - good chest expansion, good respiratory effort  Abdomen - Soft, NT, +BS, Abd rounded   Extremities - No C/C, no calf tenderness, LLE edema >RLE  Neuro-     Cognitive - awake, alert, oriented to person, place, date, year, and situation.  Able  to follow command     Communication - Fluent, Comprehensible, No dysarthria, No aphasia      Attention: Intact, able to state days of week chronologically and backwards. Able to perform simple additions and subtractions     Memory: Recall 3 objects immediate and 3 min later,      Cranial Nerves -No facial asymmetry, Tongue midline, EOMI, Shoulder shrug intact     Motor - impulsive                     LEFT    UE - ShAB 5/5, EF 5/5, EE 5/5,  5/5                    RIGHT UE - ShAB 5/5, EF 5/5, EE 5/5,   5/5                    LEFT    LE - HF 5/5, KE 5/5, DF 5/5, PF 5/5                    RIGHT LE - HF 5/5, KE 5/5, DF 5/5, PF 5/5        Sensory - Intact  to LT      Coordination - FTN/HTS intact     Tone - Normal  Psychiatric - Mood stable, Affect WNL  Skin:  R anterior cervical neck wound 1x1cm, 25cm mid sternal incision , well approximated, with steri strips, 2 lap sitex under 1.5 cm each, R anterior shoulder incision 7cm , moisture associated dermatitis groin

## 2024-11-11 NOTE — DISCHARGE NOTE PROVIDER - NSDCFUADDAPPT_GEN_ALL_CORE_FT
Please follow up with Dr. Grimm at discharge from rehab. Please contact our office at [phone] to schedule a follow up appointment.    Please follow up with your Primary Care Physician / Cardiologist in  2-4 weeks from discharge from rehab.    The Cardiac Surgery office is located at [facility], first floor. Take a left at the end of the lobby until the end of that john (past the elevator bank). Make a left and the office is on your right across from the elevators.     Your Care Navigator Nurse Practitioner will be in touch to see you in your home within a few days from discharge. The Follow Your Heart program can help ensure you understand your medications, discharge instructions and answer any questions you may have at that time. They are also a great source to address concerns during the day and may be reached at [phone].  Please follow up with Dr. Grimm at discharge from rehab. Please contact our office at [phone] to schedule a follow up appointment.    Please follow up with your Primary Care Physician / Cardiologist in  2-4 weeks from discharge from rehab.  Please call the cardiology office in [place] at [phone] to schedule an appointment with one of the doctors.     The Cardiac Surgery office is located at [facility], first floor. Take a left at the end of the lobby until the end of that john (past the elevator bank). Make a left and the office is on your right across from the elevators.     Your Care Navigator Nurse Practitioner will be in touch to see you in your home within a few days from discharge. The Follow Your Heart program can help ensure you understand your medications, discharge instructions and answer any questions you may have at that time. They are also a great source to address concerns during the day and may be reached at [phone].

## 2024-11-11 NOTE — DISCHARGE NOTE NURSING/CASE MANAGEMENT/SOCIAL WORK - FINANCIAL ASSISTANCE
[facility] provides services at a reduced cost to those who are determined to be eligible through [facility]’s financial assistance program. Information regarding [facility]’s financial assistance program can be found by going to [email or web address] or by calling [phone].

## 2024-11-11 NOTE — PATIENT PROFILE ADULT - FALL HARM RISK - HARM RISK INTERVENTIONS

## 2024-11-11 NOTE — PROGRESS NOTE ADULT - PROBLEM SELECTOR PLAN 4
on clonidine and lopressor will trend   MAP > 80
Lovenox and SCDs for DVT prophylaxis  Protonix for GI prophylaxis  continue with bowel regimen
on clonidine and lopressor will trend   MAP > 80

## 2024-11-11 NOTE — PROGRESS NOTE ADULT - SUBJECTIVE AND OBJECTIVE BOX
BRIEF HOSPITAL COURSE  38 y/o male with no pertinent past medical history presents to  with c/o chest tightness and pain, as per  chart patient described pain as a "squeezing" sensation across chest radiating down to leg originally was prescribed muscle relaxant in UC presented to  ED. Pt had CTA chest significant for Aortic dissection which is noted from the level of the root of the aorta extending into the ascending, aortic arch, descending thoracic aorta as well as extending into the abdominal aorta and is noted to end in the distal left common iliac artery. The dissection flap is in close proximity to the right coronary artery. The left renal artery is arising from the false lumen. Transferred to [facility] direct to OR for biobental with reconstruction of the RCA. Post op with hypertension and seizure (treated with IV ativan, propofol, keppra loaded). CT head/neck no acute findings. Neurology following. s/p cEEG without acute events. IV antihypertensives adjusted to PO regimen with appropriate BP limits. Recurrent fevers with stable leukocytosis, BCx NGTD, lactate WNL, UA neg. No further fevers. Overall, recovering well. Repeat CT Head no acute findings (will need f/u MRI 6 weeks postop). Residual left-sided weakness/neglect improving. Patient ambulating and showering. Dispo pending for acute rehab later today.    SIGNIFICANT RECENT/PAST 24 HR EVENTS  No acute events reported overnight.     SUBJECTIVE  Patient seen and examined on follow up. Pt currently lying in bed in NAD. Denies fevers, chills, HA, dizziness, CP, SOB, abd pain, N/V/D, numbness/tingling in extremities, or any other acute complaints. States pain well controlled on current regimen.   +Tolerating diet  +Passing BMs since surgery   +Passing gas  +Ambulating during day  +Using incentive as instructed  +Showering  ROS negative x 10 systems except as noted above.    PAST MEDICAL & SURGICAL HISTORY  No pertinent past medical history  No significant past surgical history    DAILY REVIEW  Telemetry: Sinus rhythm   Vital Signs Last 24 Hrs  T(C): 36.7 (2024 00:10), Max: 37.7 (10 Nov 2024 20:10)  T(F): 98.1 (2024 00:10), Max: 99.8 (10 Nov 2024 20:10)  HR: 65 (2024 00:10) (65 - 88)  BP: 120/68 (2024 00:10) (108/50 - 157/76)  BP(mean): --  RR: 18 (2024 00:10) (18 - 18)  SpO2: 97% (2024 00:10) (92% - 97%)    Parameters below as of 2024 00:10  Patient On (Oxygen Delivery Method): room air    I&O's Detail    2024 07:01  -  2024 07:00  --------------------------------------------------------  IN:    Oral Fluid: 120 mL  Total IN: 120 mL    OUT:    Voided (mL): 1 mL  Total OUT: 1 mL    Total NET: 119 mL    2024 07:01  -  10 Nov 2024 03:16  --------------------------------------------------------  IN:    Oral Fluid: 120 mL  Total IN: 120 mL    OUT:    Voided (mL): 1275 mL  Total OUT: 1275 mL    Total NET: -1155 mL    Last Bowel Movement: 10-Nov-2024 (11-10-24 @ 08:00)  Last Bowel Movement: 2024 (24 @ 20:00)  Last Bowel Movement: 2024 (24 @ 08:00)  Last Bowel Movement: 2024 (24 @ 23:00)  Last Bowel Movement: 2024 (24 @ 07:45)  Last Bowel Movement: 2024 (24 @ 20:00)  Last Bowel Movement: 2024 (24 @ 08:00)    Daily     Daily Weight in k.1 (2024 06:00)  Admit Wt: Drug Dosing Weight  Height (cm): 170.2 (29 Oct 2024 23:45)  Weight (kg): 107 (29 Oct 2024 23:45)  BMI (kg/m2): 36.9 (29 Oct 2024 23:45)  BSA (m2): 2.17 (29 Oct 2024 23:45)    LABS                        9.1    20.72 )-----------( 495      ( 2024 04:06 )             27.4         141  |  102  |  14.3  ----------------------------<  106[H]  4.4   |  25.0  |  0.95    Ca    8.3[L]      2024 04:06  Mg     2.2         Culture - Blood (collected 24 @ 07:07)  Source: .Blood BLOOD  Preliminary Report (24 @ 14:01):    No growth at 48 Hours    Culture - Blood (collected 24 @ 07:00)  Source: .Blood BLOOD  Preliminary Report (24 @ 14:01):    No growth at 48 Hours    Culture - Blood (collected 24 @ 10:41)  Source: .Blood BLOOD  Final Report (24 @ 19:00):    No growth at 5 days    Culture - Blood (collected 24 @ 09:51)  Source: .Blood BLOOD  Final Report (24 @ 17:00):    No growth at 5 days    MEDICATIONS  Home Medications:  None    MEDICATIONS  (STANDING):  aspirin  chewable 81 milliGRAM(s) Oral daily  chlorhexidine 2% Cloths 1 Application(s) Topical daily  cloNIDine 0.1 milliGRAM(s) Oral three times a day  enoxaparin Injectable 40 milliGRAM(s) SubCutaneous every 24 hours  furosemide    Tablet 40 milliGRAM(s) Oral daily  levETIRAcetam 1000 milliGRAM(s) Oral two times a day  methocarbamol 750 milliGRAM(s) Oral three times a day  metoprolol tartrate 50 milliGRAM(s) Oral two times a day  pantoprazole    Tablet 40 milliGRAM(s) Oral before breakfast    MEDICATIONS  (PRN):  acetaminophen     Tablet .. 975 milliGRAM(s) Oral every 6 hours PRN Temp greater or equal to 38C (100.4F), Mild Pain (1 - 3), Moderate Pain (4 - 6), Severe Pain (7 - 10)  sodium chloride 0.9% lock flush 10 milliLiter(s) IV Push every 1 hour PRN Pre/post blood products, medications, blood draw, and to maintain line patency    ALLERGIES  Allergy Status Unknown  No Known Allergies    DIAGNOSTICS  All relevant and available laboratory results, radiology and medications reviewed.    PHYSICAL EXAM  Constitutional: NAD  Neck: supple, trachea midline. No JVD   Respiratory: Breath sounds diminished b/l lower fields to auscultation, no accessory muscle use noted. No wheezing, rales, or rhonchi noted b/l   Cardiovascular: Regular rate, regular rhythm, normal S1, S2; no murmurs or rub   Gastrointestinal: Soft, non-tender, non-distended, + bowel sounds   Extremities: GOSS x 4, 1+ peripheral edema, no cyanosis, no clubbing    Vascular: Equal and normal pulses: 2+ peripheral pulses throughout  Neurological: A+O x 3; speech clear and intact; no gross sensory deficits; no obvious focal deficits  Psychiatric: calm, cooperative  Skin: warm, dry, well perfused, no rashes   Tubes/Lines: LUE midline  Incision: MSI lower pole fat necrosis, no drainage; chest tubes sites healing well; rt axillary inc well approx

## 2024-11-11 NOTE — DISCHARGE NOTE PROVIDER - HOSPITAL COURSE
38 y/o male with no pertinent past medical history presents to  with c/o chest tightness and pain, as per  chart patient described pain as a "squeezing" sensation across chest radiating down to leg originally was prescribed muscle relaxant in UC presented to  ED. Pt had CTA chest significant for Aortic dissection which is noted from the level of the root of the aorta extending into the ascending, aortic arch, descending thoracic aorta as well as extending into the abdominal aorta and is noted to end in the distal left common iliac artery. The dissection flap is in close proximity to the right coronary artery. The left renal artery is arising from the false lumen. Transferred to [facility] direct to OR for biobental with reconstruction of the RCA. Post op with hypertension and seizure (treated with IV ativan, propofol, keppra loaded). CT head/neck no acute findings. Neurology following. s/p cEEG without acute events. IV antihypertensives adjusted to PO regimen with appropriate BP limits. Recurrent fevers with stable leukocytosis, BCx NGTD, lactate WNL, UA neg. No further fevers. Overall, recovering well. Repeat CT Head no acute findings (will need f/u MRI 6 weeks postop). Residual left-sided weakness/neglect improving. Patient ambulating and showering. Patient has remained hemodynamically stable and stable from a respiratory standpoint and is deemed appropriate for discharge to rehab per Dr. Grimm. Patient will be seen in clinic for follow up at discharge from rehab.

## 2024-11-11 NOTE — DISCHARGE NOTE PROVIDER - CARE PROVIDERS DIRECT ADDRESSES
[email or web address]

## 2024-11-11 NOTE — DISCHARGE NOTE PROVIDER - NSDCCPTREATMENT_GEN_ALL_CORE_FT
PRINCIPAL PROCEDURE  Procedure: Repair of type A aortic dissection  Findings and Treatment: [place] Procedure with reconstruction of Right COronary artery

## 2024-11-11 NOTE — DISCHARGE NOTE NURSING/CASE MANAGEMENT/SOCIAL WORK - NSDCFUADDAPPT_GEN_ALL_CORE_FT
Please follow up with Dr. Grimm at discharge from rehab. Please contact our office at [phone] to schedule a follow up appointment.    Please follow up with your Primary Care Physician / Cardiologist in  2-4 weeks from discharge from rehab.  Please call the cardiology office in [place] at [phone] to schedule an appointment with one of the doctors.     The Cardiac Surgery office is located at [facility], first floor. Take a left at the end of the lobby until the end of that john (past the elevator bank). Make a left and the office is on your right across from the elevators.     Your Care Navigator Nurse Practitioner will be in touch to see you in your home within a few days from discharge. The Follow Your Heart program can help ensure you understand your medications, discharge instructions and answer any questions you may have at that time. They are also a great source to address concerns during the day and may be reached at [phone].

## 2024-11-11 NOTE — PROGRESS NOTE ADULT - PROBLEM SELECTOR PLAN 3
on keppra   transitioned to PO from IV   cEEG structural abnormal, but no events  neuro following  CT Head no acute findings, repeat showing same  Recommending MRI brain  Per Dr. Grimm, given pacing wires, will need to wait 6 weeks to get MRI. Neuro agreeable for it to be outpatient

## 2024-11-11 NOTE — PROGRESS NOTE ADULT - PROVIDER SPECIALTY LIST ADULT
Critical Care
Neurology
CT Surgery
Critical Care
Neurology
Physiatry
Physiatry
CT Surgery
Critical Care
Neurology
Physiatry
CT Surgery

## 2024-11-11 NOTE — H&P ADULT - ASSESSMENT
Assessment/Plan:  RADHA PALOMARES is a 40 y/o M with no pertinent PMHx originally presents to [facility] on 10/29/24 with c/o chest tightness and pain. Found to have an aortic dissection on CTA, transferred to [facility] for biobental with reconstruction of the RCA with Dr. Grimm on 10/29. Post op course c/b seizure and suspected CVA.  Now admitted to [facility] with left sided decificts  for initiation of a multidisciplinary rehab program consisting focused on functional mobility, transfers and ADLs.    #Aortic dissection  - CTA chest significant for Aortic dissection (HNT)  - EKG- NSR with PACs w/ LBB ()   - Troponin I 463.51 (10/29); Troponin T (2656 (10/29)---> 3342 (10/30)  - Emergent transfer to [facility] OR for BioBental reconstruction RCA on 10/29 with Dr. Grimm   - Lasix 40mg daily   - Aspirin 81mg daily   - Metoprolol 50mg BID   - Pain management: Robaxin 750mg TID, Tylenol 975mg TID PRN   - Sternal precautions; WBAT   - Daily weights    Deficits: Left hemiparesis, moderate cog deficits, delayed processing,  dysarthric , left inattention   Comprehensive Multidisciplinary Rehab Program:  - Start comprehensive rehab program, 3 hours a day, 5 days a week.  - PT 1hr/day: Focused on improving strength, endurance, coordination, balance, functional mobility, and transfers  - OT 1hr/day: Focused on improving strength, fine motor skills, coordination, posture and ADLs.    - Speech Therapy 1hr/day: to diagnose and treat deficits in swallowing, cognition and communication.   - Activity Limitations: Decreased social, vocational and leisure activities, decreased self care and ADLs, decreased mobility, decreased ability to manage household and finances.     -----------------------------------------------------------------------------------  Concurrent Medical Problems    #Recurrent Fever with leukocytosis  -Negative BCx ,   -UA neg  -Afebrile last 48 hours (Max T 99.8)  -WBC 16.47 (improvin/11)   -Continue to monitor temp and trend CBC    #Seizure  -Post op isolated seizure  -S/p cEEG structural abnormal, but no events  -CT Head no acute findings, repeat CTH negative   -Recommend f/u outpatient MRI brain for right hemispheric structural abnormality seen on cEEG and left hemiparesis- d/t pacing wires  -Keppra 1000mg BID  -Seizure precautions     #Post HTN  -Clonidine 0.1mg TID  -Metoprolol 50mg BID  -Monitor BP     #Mood/Cognition:  Neuropsychology consult PRN    #Sleep:   Maintain quiet hours and low stim environment.  Melatonin 6mg HS PRN to maximize participation in therapy during the day.     #GI/Bowel:  Senna QHS, Miralax PRN Daily  GI ppx: Pantoprazole 40mg daily     #/Bladder:   - PVR x1 on admission (SC if > 400)    #Skin/Pressure Injury:   - Skin assessment on admission: ***    #Diet  Current Diet: Regular; consistent carb diet   Nutrition consult     #Precautions / PROPHYLAXIS:   - Lungs: Aspiration, Incentive Spirometer   - DVT ppx: Lovenox 40mg daily , SCDs  - Pressure injury/Skin: OOB to Chair, PT/OT      ---------------  Code Status: FULL  Emergency Contact:    Outpatient Follow-up (Specialty/Name of physician):    Lazaro Grimm  Thoracic and Cardiac Surgery  [address] [place] [address]  Phone: [phone]  Fax: [phone]  Follow Up Time:     Keven Leach  Neurology  [address], [address]  [place] [address]  Phone: [phone]  Fax: [phone]  Follow Up Time: 1 month    Please follow up with Dr. Grimm at discharge from rehab. Please contact our office at [phone] to schedule a follow up appointment.    Please follow up with your Primary Care Physician / Cardiologist in  2-4 weeks from discharge from rehab.  Please call the cardiology office in [place] at [phone] to schedule an appointment with one of the doctors.     The Cardiac Surgery office is located at [facility], first floor. Take a left at the end of the lobby until the end of that john (past the elevator bank). Make a left and the office is on your right across from the elevators.     Your Care Navigator Nurse Practitioner will be in touch to see you in your home within a few days from discharge. The Follow Your Heart program can help ensure you understand your medications, discharge instructions and answer any questions you may have at that time. They are also a great source to address concerns during the day and may be reached at [phone].      --------------  Goals: Safe discharge to Home*****  Estimated Length of Stay: 10-14 days  Rehab Potential: Good  Medical Prognosis: Good  Estimated Disposition: Home with Home Care  ---------------    PRESCREEN COMPARISON:  I have reviewed the prescreen information and I have found no relevant changes between the preadmission screening and my post admission evaluation.    RATIONALE FOR INPATIENT ADMISSION: Patient demonstrates the following:  [X] Medically appropriate for rehabilitation admission  [X] Has attainable rehab goals with an appropriate initial discharge plan  [X]Has rehabilitation potential (expected to make a significant improvement within a reasonable period of time)  [X] Requires close medical management by a rehab physician, rehab nursing care, Hospitalist and comprehensive interdisciplinary team (including PT, OT and/or SLP, Prosthetics and Orthotics)       Assessment/Plan:  RADHA PALOMARES is a 38 y/o M with no pertinent PMHx originally presents to [facility] on 10/29/24 with c/o chest tightness and pain. Found to have an aortic dissection on CTA, transferred to [facility] for biobental with reconstruction of the RCA with Dr. Grimm on 10/29. Post op course c/b seizure and suspected CVA.  Now admitted to [facility] with left sided decificts  for initiation of a multidisciplinary rehab program consisting focused on functional mobility, transfers and ADLs.    #Aortic dissection  - CTA chest significant for Aortic dissection (HNT)  - EKG- NSR with PACs w/ LBB ()   - Troponin I 463.51 (10/29); Troponin T (2656 (10/29)---> 3342 (10/30)  - Emergent transfer to [facility] OR for BioBental reconstruction RCA on 10/29 with Dr. Grimm   - Lasix 40mg daily   - Aspirin 81mg daily   - Metoprolol 50mg BID   - Pain management: Robaxin 750mg TID, Tylenol 975mg TID PRN   - Sternal precautions; WBAT   - Daily weights    Deficits: Left hemiparesis, moderate cog deficits, delayed processing,  dysarthric , left inattention   Comprehensive Multidisciplinary Rehab Program:  - Start comprehensive rehab program, 3 hours a day, 5 days a week.  - PT 1hr/day: Focused on improving strength, endurance, coordination, balance, functional mobility, and transfers  - OT 1hr/day: Focused on improving strength, fine motor skills, coordination, posture and ADLs.    - Speech Therapy 1hr/day: to diagnose and treat deficits in swallowing, cognition and communication.   - Activity Limitations: Decreased social, vocational and leisure activities, decreased self care and ADLs, decreased mobility, decreased ability to manage household and finances.     -----------------------------------------------------------------------------------  Concurrent Medical Problems    #Recurrent Fever with leukocytosis  -Negative BCx ,   -UA neg  -Afebrile last 48 hours (Max T 99.8)  -WBC 16.47 (improvin/11)   -Continue to monitor temp and trend CBC    #Seizure  -Post op isolated seizure  - A1c 5.3   -S/p cEEG structural abnormal, but no events  -CT Head no acute findings, repeat CTH negative   -Recommend f/u outpatient MRI brain for right hemispheric structural abnormality seen on cEEG and left hemiparesis- d/t pacing wires  -Keppra 1000mg BID  -Seizure precautions     #Post HTN  -Clonidine 0.1mg TID  -Metoprolol 50mg BID  -Monitor BP     #Mood/Cognition:  Neuropsychology consult PRN    #Sleep:   Maintain quiet hours and low stim environment.  Melatonin 6mg HS PRN to maximize participation in therapy during the day.     #GI/Bowel:  Senna QHS, Miralax PRN Daily  GI ppx: Pantoprazole 40mg daily     #/Bladder:   - PVR x1 on admission (SC if > 400)    #Skin/Pressure Injury:   - Skin assessment on admission: ***    #Diet  Current Diet: Regular; consistent carb diet   Nutrition consult     #Precautions / PROPHYLAXIS:   - Lungs: Aspiration, Incentive Spirometer , deep coughing/breathing exercise  - DVT ppx: Lovenox 40mg daily , SCDs  - Pressure injury/Skin: OOB to Chair, PT/OT      ---------------  Code Status: FULL  Emergency Contact:    Outpatient Follow-up (Specialty/Name of physician):    Lazaro Grimm  Thoracic and Cardiac Surgery  [address] [place] [address]  Phone: [phone]  Fax: [phone]  Follow Up Time:     Keven Leach  Neurology  [address], [address]  [place] [address]  Phone: [phone]  Fax: [phone]  Follow Up Time: 1 month    Please follow up with Dr. Grimm at discharge from rehab. Please contact our office at [phone] to schedule a follow up appointment.    Please follow up with your Primary Care Physician / Cardiologist in  2-4 weeks from discharge from rehab.  Please call the cardiology office in [place] at [phone] to schedule an appointment with one of the doctors.     The Cardiac Surgery office is located at [facility], first floor. Take a left at the end of the lobby until the end of that john (past the elevator bank). Make a left and the office is on your right across from the elevators.     Your Care Navigator Nurse Practitioner will be in touch to see you in your home within a few days from discharge. The Follow Your Heart program can help ensure you understand your medications, discharge instructions and answer any questions you may have at that time. They are also a great source to address concerns during the day and may be reached at [phone].      --------------  Goals: Safe discharge to Home*****  Estimated Length of Stay: 10-14 days  Rehab Potential: Good  Medical Prognosis: Good  Estimated Disposition: Home with Home Care  ---------------    PRESCREEN COMPARISON:  I have reviewed the prescreen information and I have found no relevant changes between the preadmission screening and my post admission evaluation.    RATIONALE FOR INPATIENT ADMISSION: Patient demonstrates the following:  [X] Medically appropriate for rehabilitation admission  [X] Has attainable rehab goals with an appropriate initial discharge plan  [X]Has rehabilitation potential (expected to make a significant improvement within a reasonable period of time)  [X] Requires close medical management by a rehab physician, rehab nursing care, Hospitalist and comprehensive interdisciplinary team (including PT, OT and/or SLP, Prosthetics and Orthotics)       Assessment/Plan:  RADHA PALOMARES is a 40 y/o M with no pertinent PMHx originally presents to [facility] on 10/29/24 with c/o chest tightness and pain. Found to have an aortic dissection on CTA, transferred to [facility] for biobental with reconstruction of the RCA with Dr. Grimm on 10/29. Post op course c/b seizure and suspected CVA.  Now admitted to [facility] with left sided decificts  for initiation of a multidisciplinary rehab program consisting focused on functional mobility, transfers and ADLs.    #Aortic dissection  - CTA chest significant for Aortic dissection (HNT)  - EKG- NSR with PACs w/ LBB ()   - Troponin I 463.51 (10/29); Troponin T (2656 (10/29)---> 3342 (10/30)  - Emergent transfer to [facility] OR for BioBental reconstruction RCA on 10/29 with Dr. Grimm   - Lasix 40mg daily   - Aspirin 81mg daily   - Metoprolol 50mg BID   - Pain management: Robaxin 750mg TID, Tylenol 975mg TID PRN   - Sternal precautions; WBAT   - Daily weights  -Baseline EKG on admission    Deficits: Left hemiparesis, moderate cog deficits, delayed processing,  dysarthric , left inattention   Comprehensive Multidisciplinary Rehab Program:  - Start comprehensive rehab program, 3 hours a day, 5 days a week.  - PT 1hr/day: Focused on improving strength, endurance, coordination, balance, functional mobility, and transfers  - OT 1hr/day: Focused on improving strength, fine motor skills, coordination, posture and ADLs.    - Speech Therapy 1hr/day: to diagnose and treat deficits in swallowing, cognition and communication.   - Activity Limitations: Decreased social, vocational and leisure activities, decreased self care and ADLs, decreased mobility, decreased ability to manage household and finances.     -----------------------------------------------------------------------------------  Concurrent Medical Problems    #Recurrent Fever with leukocytosis  -Negative BCx ,   -UA neg  -Afebrile last 48 hours (Max T 99.8)  -WBC 16.47 (improvin/11)   -Continue to monitor temp and trend CBC    #Seizure  -Post op isolated seizure  - A1c 5.3   -S/p cEEG structural abnormal, but no events  -CT Head no acute findings, repeat CTH negative   -Recommend f/u outpatient MRI brain for right hemispheric structural abnormality seen on cEEG and left hemiparesis- d/t pacing wires  -Keppra 1000mg BID  -Seizure precautions     #Post HTN  -Clonidine 0.1mg TID  -Metoprolol 50mg BID  -Monitor BP     #Mood/Cognition:  Neuropsychology consult PRN    #Sleep:   Maintain quiet hours and low stim environment.  Melatonin 6mg HS PRN to maximize participation in therapy during the day.     #GI/Bowel:  Senna QHS, Miralax PRN Daily  GI ppx: Pantoprazole 40mg daily     #/Bladder:   - PVR x1 on admission (SC if > 400)    #Skin/Pressure Injury:   - Skin assessment on admission: ***    #Diet  Current Diet: Regular; DASH  Nutrition consult     #Precautions / PROPHYLAXIS:   - Lungs: Aspiration, Incentive Spirometer , deep coughing/breathing exercise  - DVT ppx: Lovenox 40mg daily , SCDs  - Pressure injury/Skin: OOB to Chair, PT/OT      ---------------  Code Status: FULL  Emergency Contact:    Outpatient Follow-up (Specialty/Name of physician):    Lazaro Grimm  Thoracic and Cardiac Surgery  [address] [place] [address]  Phone: [phone]  Fax: [phone]  Follow Up Time:     Keven Leach  Neurology  [address]  [place] [address]  Phone: [phone]  Fax: [phone]  Follow Up Time: 1 month    Please follow up with Dr. Grimm at discharge from rehab. Please contact our office at [phone] to schedule a follow up appointment.    Please follow up with your Primary Care Physician / Cardiologist in  2-4 weeks from discharge from rehab.  Please call the cardiology office in [place] at [phone] to schedule an appointment with one of the doctors.     The Cardiac Surgery office is located at [facility], first floor. Take a left at the end of the lobby until the end of that john (past the elevator bank). Make a left and the office is on your right across from the elevators.     Your Care Navigator Nurse Practitioner will be in touch to see you in your home within a few days from discharge. The Follow Your Heart program can help ensure you understand your medications, discharge instructions and answer any questions you may have at that time. They are also a great source to address concerns during the day and may be reached at [phone].      --------------  Goals: Safe discharge to Home*****  Estimated Length of Stay: 10-14 days  Rehab Potential: Good  Medical Prognosis: Good  Estimated Disposition: Home with Home Care  ---------------    PRESCREEN COMPARISON:  I have reviewed the prescreen information and I have found no relevant changes between the preadmission screening and my post admission evaluation.    RATIONALE FOR INPATIENT ADMISSION: Patient demonstrates the following:  [X] Medically appropriate for rehabilitation admission  [X] Has attainable rehab goals with an appropriate initial discharge plan  [X]Has rehabilitation potential (expected to make a significant improvement within a reasonable period of time)  [X] Requires close medical management by a rehab physician, rehab nursing care, Hospitalist and comprehensive interdisciplinary team (including PT, OT and/or SLP, Prosthetics and Orthotics)       Assessment/Plan:  RADHA PALOMARES is a 38 y/o M with no pertinent PMHx originally presents to [facility] on 10/29/24 with c/o chest tightness and pain. Found to have an aortic dissection on CTA, transferred to [facility] for biobental with reconstruction of the RCA with Dr. Grimm on 10/29. Post op course c/b seizure and suspected CVA.  Now admitted to [facility] with left sided decificts  for initiation of a multidisciplinary rehab program consisting focused on functional mobility, transfers and ADLs.    #Aortic dissection  - CTA chest significant for Aortic dissection (HNT)  - EKG- NSR with PACs w/ LBB ()   - Troponin I 463.51 (10/29); Troponin T (2656 (10/29)---> 3342 (10/30)  - Emergent transfer to [facility] OR for BioBental reconstruction RCA on 10/29 with Dr. Grimm   - Lasix 40mg daily   - Aspirin 81mg daily   - Metoprolol 50mg BID   - Pain management: Robaxin 750mg TID, Tylenol 975mg TID PRN   - Sternal precautions; WBAT   - Daily weights  -Baseline EKG on admission    Deficits: Left hemiparesis, moderate cog deficits, delayed processing,  dysarthric , left inattention   Comprehensive Multidisciplinary Rehab Program:  - Start comprehensive rehab program, 3 hours a day, 5 days a week.  - PT 1hr/day: Focused on improving strength, endurance, coordination, balance, functional mobility, and transfers  - OT 1hr/day: Focused on improving strength, fine motor skills, coordination, posture and ADLs.    - Speech Therapy 1hr/day: to diagnose and treat deficits in swallowing, cognition and communication.   - Activity Limitations: Decreased social, vocational and leisure activities, decreased self care and ADLs, decreased mobility, decreased ability to manage household and finances.     -----------------------------------------------------------------------------------  Concurrent Medical Problems    #Recurrent Fever with leukocytosis  -Negative BCx ,   -UA neg  -Afebrile last 48 hours (Max T 99.8)  -WBC 16.47 (improvin/11)   -Continue to monitor temp and trend CBC    #Seizure  -Post op isolated seizure  - A1c 5.3   -S/p cEEG structural abnormal, but no events  -CT Head no acute findings, repeat CTH negative   -Recommend f/u outpatient MRI brain for right hemispheric structural abnormality seen on cEEG and left hemiparesis- d/t pacing wires  -Keppra 1000mg BID  -Seizure precautions     #Post HTN  -Clonidine 0.1mg TID  -Metoprolol 50mg BID  -Monitor BP     #Mood/Cognition:  Neuropsychology consult PRN    #Sleep:   Maintain quiet hours and low stim environment.  Melatonin 6mg HS PRN to maximize participation in therapy during the day.     #GI/Bowel:  Senna QHS, Miralax PRN Daily  GI ppx: Pantoprazole 40mg daily     #/Bladder:   - PVR x1 on admission (SC if > 400)    #Skin/Pressure Injury:   - Skin assessment on admission: R anterior cervical neck wound 1x1cm, 25cm mid sternal incision , well approximated, with steri strips, 2 lap sitex under 1.5 cm each, R anterior shoulder incision 7cm , moisture associated dermatitis groin   - Nystatin BID    #Diet  Current Diet: Regular; DASH  Nutrition consult     #Precautions / PROPHYLAXIS:   - Lungs: Aspiration, Incentive Spirometer , deep coughing/breathing exercise  - DVT ppx: Lovenox 40mg daily , SCDs  - Pressure injury/Skin: OOB to Chair, PT/OT      ---------------  Code Status: FULL  Emergency Contact:    Outpatient Follow-up (Specialty/Name of physician):    Lazaro Grimm  Thoracic and Cardiac Surgery  [address] [place] [address]  Phone: [phone]  Fax: [phone]  Follow Up Time:     Keven Leach  Neurology  [address], [address]  [place] [address]  Phone: [phone]  Fax: [phone]  Follow Up Time: 1 month    Please follow up with Dr. Grimm at discharge from rehab. Please contact our office at [phone] to schedule a follow up appointment.    Please follow up with your Primary Care Physician / Cardiologist in  2-4 weeks from discharge from rehab.  Please call the cardiology office in [place] at [phone] to schedule an appointment with one of the doctors.     The Cardiac Surgery office is located at [facility], first floor. Take a left at the end of the lobby until the end of that john (past the elevator bank). Make a left and the office is on your right across from the elevators.     Your Care Navigator Nurse Practitioner will be in touch to see you in your home within a few days from discharge. The Follow Your Heart program can help ensure you understand your medications, discharge instructions and answer any questions you may have at that time. They are also a great source to address concerns during the day and may be reached at [phone].      --------------  Goals: Safe discharge to Home*****  Estimated Length of Stay: 10-14 days  Rehab Potential: Good  Medical Prognosis: Good  Estimated Disposition: Home with Home Care  ---------------    PRESCREEN COMPARISON:  I have reviewed the prescreen information and I have found no relevant changes between the preadmission screening and my post admission evaluation.    RATIONALE FOR INPATIENT ADMISSION: Patient demonstrates the following:  [X] Medically appropriate for rehabilitation admission  [X] Has attainable rehab goals with an appropriate initial discharge plan  [X]Has rehabilitation potential (expected to make a significant improvement within a reasonable period of time)  [X] Requires close medical management by a rehab physician, rehab nursing care, Hospitalist and comprehensive interdisciplinary team (including PT, OT and/or SLP, Prosthetics and Orthotics)       Assessment/Plan:  Mr. Brennan Schneider is a 39-year-old male patient with no previous past medical history who is admitted for Acute Inpatient Rehabilitation with a multidisciplinary rehab program at  with functional impairments in ADLs and mobility secondary to aortic dissection from the level of the root of the aorta extending into the ascending, aortic arch, descending thoracic aorta as well as extending into the abdominal aorta and was noted to end in the distal left common iliac artery S/P repair of type A aortic dissection by Bentall procedure with reconstruction of right coronary artery, dissection of aortic root, and aortic valve conduit by Dr. Grimm (10/29/24) with post-operative course remarkable for clinical suggestion of stroke with no radiographic confirmation.    #Dissection of ascending aorta and aortic arch s/p surgical repair  - CTA chest significant for Aortic dissection (HNT)      * Emergent transfer to [facility] OR for BioBental reconstruction RCA on 10/29 with Dr. Grimm   - S/P repair of type A aortic dissection by Bentall procedure with reconstruction of right coronary artery, dissection of aortic root, and aortic valve conduit by Dr. Grimm (10/29/24)      * Sternal precautions; WBAT       * Daily weights      * Baseline EKG on admission  - Prior EKG- NSR with PACs w/ LBB ()       * Troponin I 463.51 (10/29); Troponin T (2656 (10/29)---> 3342 (10/30)  - Medications to continue:      * Lasix 40mg daily       * Aspirin 81mg daily       * Metoprolol 50mg BID       * Pain management: Robaxin 750mg TID, Tylenol 975mg TID PRN   - Clinical suspicion from Neurology of a right frontoparietal stroke      * Deficits: Left hemiparesis, moderate cog deficits, delayed processing,  dysarthric , left inattention   - Comprehensive Multidisciplinary Rehab Program:      * 3 hours a day, 5 days a week      * PT 1hr/day: Focused on improving strength, endurance, coordination, balance, functional mobility, and transfers      * OT 1hr/day: Focused on improving strength, fine motor skills, coordination, posture and ADLs.        * Speech Therapy 1hr/day: to diagnose and treat deficits in swallowing, cognition and communication.   - Activity Limitations: Decreased social, vocational and leisure activities, decreased self care and ADLs, decreased mobility, decreased ability to manage household and finances.     -----------------------------------------------------------------------------------  Concurrent Medical Problems    #Recurrent Fever with leukocytosis  - Negative BCx ,   - UA neg  - Afebrile last 48 hours (Max T 99.8)  - WBC 16.47 (improvin/11)   - Continue to monitor temp and trend CBC    #Seizure  -Post op isolated seizure  - A1c 5.3   - S/p cEEG structural abnormal, but no events  - CT Head no acute findings, repeat CTH negative   - Recommend f/u outpatient MRI brain for right hemispheric structural abnormality seen on cEEG and left hemiparesis- d/t pacing wires  - Keppra 1000mg BID  - Seizure precautions     #Post HTN  - Clonidine 0.1mg TID  - Metoprolol 50mg BID  - Monitor BP     #Mood/Cognition:  Neuropsychology consult PRN    #Sleep:   Maintain quiet hours and low stim environment.  Melatonin 6mg HS PRN to maximize participation in therapy during the day.     #GI/Bowel:  Senna QHS, Miralax PRN Daily  GI ppx: Pantoprazole 40mg daily     #/Bladder:   - PVR x1 on admission  - SC if > 400  - Notify MD to discontinue if low PVR    #Skin/Pressure Injury:   - Skin assessment on admission: R anterior cervical neck wound 1x1cm, 25cm mid sternal incision , well approximated, with steri strips, 2 lap sitex under 1.5 cm each, R anterior shoulder incision 7cm, moisture associated dermatitis groin   - Nystatin BID    #Diet  Current Diet: Regular; DASH  Nutrition consult     #Precautions / PROPHYLAXIS:   - Lungs: Aspiration, Incentive Spirometer, deep coughing/breathing exercise  - DVT ppx: Lovenox 40mg daily , SCDs  - Pressure injury/Skin: OOB to Chair, PT/OT      ---------------  Code Status: FULL  Emergency Contact:    Outpatient Follow-up (Specialty/Name of physician):    Lazaro Grimm  Thoracic and Cardiac Surgery  [address] [place] [id number]  Phone: [phone]  Fax: [phone]  Follow Up Time:     Keven Leach  Neurology  [address] [facility], [address]  [place] [address]  Phone: [phone]  Fax: [phone]  Follow Up Time: 1 month    Please follow up with Dr. Grimm at discharge from rehab. Please contact our office at [phone] to schedule a follow up appointment.    Please follow up with your Primary Care Physician / Cardiologist in  2-4 weeks from discharge from rehab.  Please call the cardiology office in [place] at [phone] to schedule an appointment with one of the doctors.     The Cardiac Surgery office is located at [facility], first floor. Take a left at the end of the lobby until the end of that john (past the elevator bank). Make a left and the office is on your right across from the elevators.     Your Care Navigator Nurse Practitioner will be in touch to see you in your home within a few days from discharge. The Follow Your Heart program can help ensure you understand your medications, discharge instructions and answer any questions you may have at that time. They are also a great source to address concerns during the day and may be reached at [phone].      --------------  Goals: Safe discharge to Home*****  Estimated Length of Stay: 10-14 days  Rehab Potential: Good  Medical Prognosis: Good  Estimated Disposition: Home with Home Care  ---------------    PRESCREEN COMPARISON:  I have reviewed the prescreen information and I have found no relevant changes between the preadmission screening and my post admission evaluation.    RATIONALE FOR INPATIENT ADMISSION: Patient demonstrates the following:  [X] Medically appropriate for rehabilitation admission  [X] Has attainable rehab goals with an appropriate initial discharge plan  [X]Has rehabilitation potential (expected to make a significant improvement within a reasonable period of time)  [X] Requires close medical management by a rehab physician, rehab nursing care, Hospitalist and comprehensive interdisciplinary team (including PT, OT and/or SLP, Prosthetics and Orthotics)       Assessment/Plan:  Mr. Brennan Schneider is a 39-year-old male patient with no previous past medical history who is admitted for Acute Inpatient Rehabilitation with a multidisciplinary rehab program at [facility] with functional impairments in ADLs and mobility secondary to aortic dissection from the level of the root of the aorta extending into the ascending, aortic arch, descending thoracic aorta as well as extending into the abdominal aorta and was noted to end in the distal left common iliac artery S/P repair of type A aortic dissection by Bentall procedure with reconstruction of right coronary artery, dissection of aortic root, and aortic valve conduit by Dr. Grimm (10/29/24) with post-operative course remarkable for clinical suggestion of stroke with no radiographic confirmation.    #Dissection of ascending aorta and aortic arch s/p surgical repair  - CTA chest significant for Aortic dissection (HNT)      * Emergent transfer to [facility] OR for BioBental reconstruction RCA on 10/29 with Dr. Grimm   - S/P repair of type A aortic dissection by Bentall procedure with reconstruction of right coronary artery, dissection of aortic root, and aortic valve conduit by Dr. Grimm (10/29/24)      * Sternal precautions; WBAT       * Daily weights      * Baseline EKG on admission  - Prior EKG- NSR with PACs w/ LBB ()       * Troponin I 463.51 (10/29); Troponin T (2656 (10/29)---> 3342 (10/30)  - Medications to continue:      * Lasix 40mg daily       * Aspirin 81mg daily       * Metoprolol 50mg BID       * Pain management: Robaxin 750mg TID, Tylenol 975mg TID PRN   - Clinical CVA from Neurology of a right frontoparietal stroke      * Deficits: Left hemiparesis, moderate cog deficits, delayed processing,  dysarthric , left inattention   - Comprehensive Multidisciplinary Rehab Program:      * 3 hours a day, 5 days a week      * PT 1hr/day: Focused on improving strength, endurance, coordination, balance, functional mobility, and transfers      * OT 1hr/day: Focused on improving strength, fine motor skills, coordination, posture and ADLs.        * Speech Therapy 1hr/day: to diagnose and treat deficits in swallowing, cognition and communication.   - Activity Limitations: Decreased social, vocational and leisure activities, decreased self care and ADLs, decreased mobility, decreased ability to manage household and finances.     -----------------------------------------------------------------------------------  Concurrent Medical Problems    #Recurrent Fever with leukocytosis  - Negative BCx ,   - UA neg  - Afebrile last 48 hours (Max T 99.8)  - WBC 16.47 (improvin/11)   - Continue to monitor temp and trend CBC    #Seizure  -Post op isolated seizure  - A1c 5.3   - S/p cEEG structural abnormal, but no events  - CT Head no acute findings, repeat CTH negative   - Recommend f/u outpatient MRI brain for right hemispheric structural abnormality seen on cEEG and left hemiparesis- d/t pacing wires  - Keppra 1000mg BID  - Seizure precautions     #Post HTN  - Clonidine 0.1mg TID  - Metoprolol 50mg BID  - Monitor BP     #Mood/Cognition:  Neuropsychology consult PRN    #Sleep:   Maintain quiet hours and low stim environment.  Melatonin 6mg HS PRN to maximize participation in therapy during the day.     #GI/Bowel:  Senna QHS, Miralax PRN Daily  GI ppx: Pantoprazole 40mg daily     #/Bladder:   - PVR x1 on admission  - SC if > 400  - Notify MD to discontinue if low PVR    #Skin/Pressure Injury:   - Skin assessment on admission: R anterior cervical neck wound 1x1cm, 25cm mid sternal incision , well approximated, with steri strips, 2 lap sitex under 1.5 cm each, R anterior shoulder incision 7cm, moisture associated dermatitis groin   - Nystatin BID    #Diet  Current Diet: Regular; DASH  Nutrition consult     #Precautions / PROPHYLAXIS:   - Lungs: Aspiration, Incentive Spirometer, deep coughing/breathing exercise  - DVT ppx: Lovenox 40mg daily , SCDs  - Pressure injury/Skin: OOB to Chair, PT/OT      ---------------  Code Status: FULL  Emergency Contact:    Outpatient Follow-up (Specialty/Name of physician):    Lazaro Grimm  Thoracic and Cardiac Surgery  [address] [place] [id number]  Phone: [phone]  Fax: [phone]  Follow Up Time:     Keven Leach  Neurology  [address] [facility], [address]  [place] [address]  Phone: [phone]  Fax: [phone]  Follow Up Time: 1 month    Please follow up with Dr. Grimm at discharge from rehab. Please contact our office at [phone] to schedule a follow up appointment.    Please follow up with your Primary Care Physician / Cardiologist in  2-4 weeks from discharge from rehab.  Please call the cardiology office in [place] at [phone] to schedule an appointment with one of the doctors.     The Cardiac Surgery office is located at [facility], first floor. Take a left at the end of the lobby until the end of that john (past the elevator bank). Make a left and the office is on your right across from the elevators.     Your Care Navigator Nurse Practitioner will be in touch to see you in your home within a few days from discharge. The Follow Your Heart program can help ensure you understand your medications, discharge instructions and answer any questions you may have at that time. They are also a great source to address concerns during the day and may be reached at [phone].      --------------  Goals: Safe discharge to Home*****  Estimated Length of Stay: 10-14 days  Rehab Potential: Good  Medical Prognosis: Good  Estimated Disposition: Home with Home Care  ---------------    PRESCREEN COMPARISON:  I have reviewed the prescreen information and I have found no relevant changes between the preadmission screening and my post admission evaluation.    RATIONALE FOR INPATIENT ADMISSION: Patient demonstrates the following:  [X] Medically appropriate for rehabilitation admission  [X] Has attainable rehab goals with an appropriate initial discharge plan  [X]Has rehabilitation potential (expected to make a significant improvement within a reasonable period of time)  [X] Requires close medical management by a rehab physician, rehab nursing care, Hospitalist and comprehensive interdisciplinary team (including PT, OT and/or SLP, Prosthetics and Orthotics)

## 2024-11-11 NOTE — PROGRESS NOTE ADULT - PROBLEM SELECTOR PLAN 5
Lovenox and SCDs for DVT prophylaxis  Protonix for GI prophylaxis
Lovenox and SCDs for DVT prophylaxis  Protonix for GI prophylaxis  continue with bowel regimen
Lovenox and SCDs for DVT prophylaxis  Protonix for GI prophylaxis
Lovenox and SCDs for DVT prophylaxis  Protonix for GI prophylaxis  continue with bowel regimen

## 2024-11-11 NOTE — DISCHARGE NOTE PROVIDER - NSDCFUADDINST_GEN_ALL_CORE_FT
Please call the Cardiothoracic Surgery office at [phone] if you are experiencing any shortness of breath, chest pain, fevers or chills, drainage from the incisions, persistent nausea, vomiting or if you have any questions about your medications. If the symptoms are severe, call 911 and go to the nearest hospital. You can also call [phone] for an emergency [facility] ambulance, which will take you to the closest [facility].    If you need any assistance for making any appointments for a new consult or referral in any specialty, please call our [facility] Clinical Coordination Center at [phone].

## 2024-11-11 NOTE — CHART NOTE - NSCHARTNOTEFT_GEN_A_CORE
Source: Patient, chart    Current Diet: Diet, Regular:   Consistent Carbohydrate {No Snacks} (CSTCHO)  DASH/TLC {Sodium & Cholesterol Restricted} (DASH) (11-02-24 @ 15:01)    PO intake:  %     Source for PO intake: Patient, chart     Current Weight:   10/30 248.2 lbs  11/1 248.9 lbs  11/5 243.8 lbs  11/10 201.7 lbs  11/11 194 lbs    Generalized 1+ edema     Pertinent Medications: MEDICATIONS  (STANDING):  cloNIDine 0.1 milliGRAM(s) Oral three times a day  enoxaparin Injectable 40 milliGRAM(s) SubCutaneous every 24 hours  furosemide    Tablet 40 milliGRAM(s) Oral daily  levETIRAcetam 1000 milliGRAM(s) Oral two times a day  metoprolol tartrate 50 milliGRAM(s) Oral two times a day  pantoprazole    Tablet 40 milliGRAM(s) Oral before breakfast    Pertinent Labs: 11-11 Na139 mmol/L Glu 113 mg/dL[H] K+ 4.3 mmol/L Cr  0.85 mg/dL BUN 14.6 mg/dL     Estimated Needs:   7634-7828 kcal (20-25 kcal/kg 111kg)  111-133 g protein (1-1.2g/kg 111kg)    Hospital Course: 40 y/o male with no pertinent past medical history presents to  with c/o chest tightness and pain. Pt had CTA chest significant for Aortic dissection. Transferred to [facility] direct to OR for biobental with reconstruction of the RCA. Post op with hypertension and seizure. IV antihypertensives adjusted to PO regimen with appropriate BP limits. Recurrent fevers with stable leukocytosis, BCx NGTD, lactate WNL, UA neg. No further fevers. Overall, recovering well. Repeat CT Head no acute findings (will need f/u MRI 6 weeks postop). Residual left-sided weakness/neglect improving. Patient ambulating and showering. Dispo pending for acute rehab later today.    Current Nutrition Diagnosis: Increased nutrient needs (protein, micronutrient) related to increased physiologic demand for healing as evidenced by s/p repair of Type A dissection, Bentall Procedure with reconstruction of RCA.    Patient previously continuing to tolerate current diet well with good appetite/PO intake. Pt reports eating 100% of breakfast and lunch today. Pt with no c/o N/V/C/D at this time. Will continue to monitor and follow up as needed. RD remains available.     Recommendations:   1) Discontinue consistent carbohydrate diet restriction - POCT glucose no longer being monitored.  2) Continue DASH/TLC diet.  3) Encourage po intake, monitor diet tolerance, and provide assistance at meals as needed.   4) Rx: MVI daily.   5) Obtain daily weights to monitor trends.     Monitoring and Evaluation:   [x] PO intake [x] Tolerance to diet prescription [X] Weights  [X] Follow up per protocol [X] Labs: chem 8

## 2024-11-11 NOTE — DISCHARGE NOTE PROVIDER - NSDCCPCAREPLAN_GEN_ALL_CORE_FT
PRINCIPAL DISCHARGE DIAGNOSIS  Diagnosis: Aortic dissection  Assessment and Plan of Treatment: - Shower daily, soap and water to incisions  - No lotions/creams to incisions  - No swimming/pools/hot tubs/baths until incisions healed  - No driving or heavy lifting >10lbs until cleared by surgeon  - Weigh yourself daily and keep a log  - Refer to discharge booklet for additional instructions      SECONDARY DISCHARGE DIAGNOSES  Diagnosis: Seizure  Assessment and Plan of Treatment: Take all medications as prescribed.   Follow up with Neurology 2-4 weeks from discharge.    Diagnosis: Hypertension  Assessment and Plan of Treatment: Take all medications as prescribed. Follow up with your primary care doctor/Cardiologist within two weeks.     PRINCIPAL DISCHARGE DIAGNOSIS  Diagnosis: Aortic dissection  Assessment and Plan of Treatment: - Shower daily, soap and water to incisions  - No lotions/creams to incisions  - No swimming/pools/hot tubs/baths until incisions healed  - No driving or heavy lifting >10lbs until cleared by surgeon  - Weigh yourself daily and keep a log  - Refer to discharge booklet for additional instructions      SECONDARY DISCHARGE DIAGNOSES  Diagnosis: Seizure  Assessment and Plan of Treatment: Take all medications as prescribed.   Follow up with Neurology 2-4 weeks from discharge. You will need an MRI outpatient.    Diagnosis: Hypertension  Assessment and Plan of Treatment: Take all medications as prescribed. Follow up with your primary care doctor/Cardiologist within two weeks.

## 2024-11-11 NOTE — PROGRESS NOTE ADULT - ASSESSMENT
39y Male with aortic dissection who remains poorly responsive on mechanical ventilator.    Altered mental status   Improved.     Seizure.  Now on Keppra.   Continue c-EEG.   Iff EEG stable through tomorrow am, will discontinue.  Findings suggestive of a structural abnormality in the right hemisphere.    Exam and EEG suspicious for right hemisphere structural lesion.  Suggest repeat imaging once off EEG.    Discussed with CICU team.
40 y/o male with no pertinent past medical history presents to  with c/o chest tightness and pain, as per  chart patient described pain as a "squeezing" sensation across chest radiating down to leg originally was prescribed muscle relaxant in UC presented to  ED patient Pt had CTA chest significant for Aortic dissection which is noted from the level of the root of the aorta extending into the ascending, aortic arch, descending thoracic aorta as well as extending into the abdominal aorta and is noted to end in the distal left common iliac artery. The dissection flap is in close proximity to the right coronary artery. The left renal artery is arising from the false lumen. Transferred to [facility] direct to OR for biobental with reconstruction of the RCA. Post op with hypertension and seizure (treated with IV ativan, propofol, keppra loaded). CT head/neck no acute findings. Neurology following. s/p cEEG without acute events. Overall, recovering well. Remains with left sided weakness. IV antihypertensives adjusted to PO regimen with appropriate BP limits. Dispo pending for acute rehab. 
38 y/o male with no pertinent past medical history presents to  with c/o chest tightness and pain, as per  chart patient described pain as a "squeezing" sensation across chest radiating down to leg originally was prescribed muscle relaxant in UC presented to  ED. Pt had CTA chest significant for Aortic dissection which is noted from the level of the root of the aorta extending into the ascending, aortic arch, descending thoracic aorta as well as extending into the abdominal aorta and is noted to end in the distal left common iliac artery. The dissection flap is in close proximity to the right coronary artery. The left renal artery is arising from the false lumen. Transferred to [facility] direct to OR for biobental with reconstruction of the RCA. Post op with hypertension and seizure (treated with IV ativan, propofol, keppra loaded). CT head/neck no acute findings. Neurology following. s/p cEEG without acute events. Overall, recovering well. Remains with left sided weakness. IV antihypertensives adjusted to PO regimen with appropriate BP limits. Recurrent fevers with stable leukocytosis, BCx NGTD, lactate WNL. Dispo pending for acute rehab. 
39y Male with aortic dissection who remains poorly responsive on mechanical ventilator.    Altered mental status   Possible toxic metabolic encephalopathy.  Concern for possible cerebral anoxia as well.  Initial CT negative.  Given relatively decreased movement on left, must consider right hemisphere stroke.  Eventual repeat imaging of brain.    Seizure.  Now on Keppra.   Continue c-EEG.   
38 y/o male with no pertinent past medical history presents to  with c/o chest tightness and pain, as per  chart patient described pain as a "squeezing" sensation across chest radiating down to leg originally was prescribed muscle relaxant in UC presented to  ED. Pt had CTA chest significant for Aortic dissection which is noted from the level of the root of the aorta extending into the ascending, aortic arch, descending thoracic aorta as well as extending into the abdominal aorta and is noted to end in the distal left common iliac artery. The dissection flap is in close proximity to the right coronary artery. The left renal artery is arising from the false lumen. Transferred to [facility] direct to OR for biobental with reconstruction of the RCA. Post op with hypertension and seizure (treated with IV ativan, propofol, keppra loaded). CT head/neck no acute findings. Neurology following. s/p cEEG without acute events. Overall, recovering well. Remains with left sided weakness. IV antihypertensives adjusted to PO regimen with appropriate BP limits. Dispo pending for acute rehab. 
39y Male with aortic dissection who remains poorly responsive on mechanical ventilator.    Altered mental status   Improved.     Seizure.  Continue Keppra  Exam and EEG suspicious for right hemisphere structural lesion.  Sspect right frontoparietal stroke given exam findings  Repeat CT head non contrast did not show stroke  will need MRI brain once safe from CT Surgery perspective (an be done as an outpatient)    Discussed with CT Surgery team.   neuro stable for d/c on keppra with outpatient follow up once cleared by CT surgery    Thank you for allowing me to participate in the care of your patient    Keven Leach MD, PhD   [id number]
39y Male with aortic dissection who remains poorly responsive on mechanical ventilator.    Altered mental status   Improved.     Seizure.  Continue Keppra  Exam and EEG suspicious for right hemisphere structural lesion.  Sspect right frontoparietal stroke given exam findings  Suggest repeat CT head non contrast  will need MRI brain once safe from CT Surgery perspective    Discussed with CT Surgery team.   will follow with you    Keven Leach MD PhD   [id number]  
39y Male with aortic dissection who remains poorly responsive on mechanical ventilator.    Altered mental status   Improved.     Seizure.  Now on Keppra.   Continue c-EEG.   Iff EEG stable through tomorrow am, will discontinue.  Findings suggestive of a structural abnormality in the right hemisphere.    Exam and EEG suspicious for right hemisphere structural lesion.  Suggest repeat CT head non contrast in am.   If negative will need MRI brain.    Discussed with CICU team. Dr Aquino attending.  
38 y/o male with no pertinent past medical history presents to  with c/o chest tightness and pain, as per  chart patient described pain as a "squeezing" sensation across chest radiating down to leg originally was prescribed muscle relaxant in UC presented to  ED. Pt had CTA chest significant for Aortic dissection which is noted from the level of the root of the aorta extending into the ascending, aortic arch, descending thoracic aorta as well as extending into the abdominal aorta and is noted to end in the distal left common iliac artery. The dissection flap is in close proximity to the right coronary artery. The left renal artery is arising from the false lumen. Transferred to [facility] direct to OR for biobental with reconstruction of the RCA. Post op with hypertension and seizure (treated with IV ativan, propofol, keppra loaded). CT head/neck no acute findings. Neurology following. s/p cEEG without acute events. Overall, recovering well. Remains with left sided weakness. IV antihypertensives adjusted to PO regimen with appropriate BP limits. Dispo pending for acute rehab. 
38 y/o male with no pertinent past medical history presents to  with c/o chest tightness and pain, as per  chart patient described pain as a "squeezing" sensation across chest radiating down to leg originally was prescribed muscle relaxant in UC presented to  ED. Pt had CTA chest significant for Aortic dissection which is noted from the level of the root of the aorta extending into the ascending, aortic arch, descending thoracic aorta as well as extending into the abdominal aorta and is noted to end in the distal left common iliac artery. The dissection flap is in close proximity to the right coronary artery. The left renal artery is arising from the false lumen. Transferred to [facility] direct to OR for biobental with reconstruction of the RCA. Post op with hypertension and seizure (treated with IV ativan, propofol, keppra loaded). CT head/neck no acute findings. Neurology following. s/p cEEG without acute events. IV antihypertensives adjusted to PO regimen with appropriate BP limits. Recurrent fevers with stable leukocytosis, BCx NGTD, lactate WNL, UA neg. No further fevers. Overall, recovering well. Repeat CT Head no acute findings (will need f/u MRI 6 weeks postop). Residual left-sided weakness/neglect improving. Patient ambulating and showering. Dispo pending for acute rehab. 
38 y/o male with no pertinent past medical history presents to  with c/o chest tightness and pain, as per  chart patient described pain as a "squeezing" sensation across chest radiating down to leg originally was prescribed muscle relaxant in UC presented to  ED patient Pt had CTA chest significant for Aortic dissection which is noted from the level of the root of the aorta extending into the ascending, aortic arch, descending thoracic aorta as well as extending into the abdominal aorta and is noted to end in the distal left common iliac artery. The dissection flap is in close proximity to the right coronary artery. The left renal artery is arising from the false lumen. Transferred to [facility] direct to OR for biobental with reconstruction of the RCA. Post op with hypertension and seizure (treated with IV ativan, propofol, keppra loaded). CT head/neck no acute findings. Neurology following. s/p cEEG without acute events. Overall, recovering well. Remains with left sided weakness. IV antihypertensives adjusted to PO regimen with appropriate BP limits. Dispo pending for acute rehab. 
40 y/o male with no pertinent past medical history presents to  with c/o chest tightness and pain, as per  chart patient described pain as a "squeezing" sensation across chest radiating down to leg originally was prescribed muscle relaxant in UC presented to  ED. Pt had CTA chest significant for Aortic dissection which is noted from the level of the root of the aorta extending into the ascending, aortic arch, descending thoracic aorta as well as extending into the abdominal aorta and is noted to end in the distal left common iliac artery. The dissection flap is in close proximity to the right coronary artery. The left renal artery is arising from the false lumen. Transferred to [facility] direct to OR for biobental with reconstruction of the RCA. Post op with hypertension and seizure (treated with IV ativan, propofol, keppra loaded). CT head/neck no acute findings. Neurology following. s/p cEEG without acute events. IV antihypertensives adjusted to PO regimen with appropriate BP limits. Recurrent fevers with stable leukocytosis, BCx NGTD, lactate WNL, UA neg. No further fevers. Overall, recovering well. Repeat CT Head no acute findings (will need f/u MRI 6 weeks postop). Residual left-sided weakness/neglect improving. Patient ambulating and showering. Dispo pending for acute rehab later today.
40 y/o male with no pertinent past medical history presents to  with c/o chest tightness and pain, as per  chart patient described pain as a "squeezing" sensation across chest radiating down to leg originally was prescribed muscle relaxant in UC presented to  ED patient Pt had CTA chest significant for Aortic dissection which is noted from the level of the root of the aorta extending into the ascending, aortic arch, descending thoracic aorta as well as extending into the abdominal aorta and is noted to end in the distal left common iliac artery. The dissection flap is in close proximity to the right coronary artery. The left renal artery is arising from the false lumen. transferred to [facility] direct to OR for biobental with reconstruction of the RCA post op with hypertension and seizure started on keppra seen by neuro

## 2024-11-11 NOTE — H&P ADULT - REASON FOR ADMISSION
Aortic Dissection Dissection of ascending aorta and aortic arch s/p surgical repair Dissection of ascending aorta and aortic arch s/p surgical repair, clinical CVA

## 2024-11-11 NOTE — PROGRESS NOTE ADULT - ATTENDING COMMENTS
Patient seen and examined. Case discussed with Dr. Reagan and agree with recommendations outlined above. I have personally reviewed the imaging and labs listed above.    Rehab/Impaired mobility and function - Patient continues to require hospitalization for the above diagnoses and ongoing active management of comorbid complications that are substantially posing a threat to bodily function, functional ability and quality of life, necessitating transfer of hospitalization at an acute inpatient rehabilitation facility.     When medically optimized, based on the patient's diagnosis, current functional status and potential for progress, recommend ACUTE inpatient rehabilitation for the functional deficits consisting of 3 hours of therapy/day & 24 hour RN/daily PMR physician for active comorbid medical management. Patient will be able to tolerate 3 hours a day.    Goals for acute inpatient rehab are to achieve modified independence with bed mobility, modified independence with transfers and modified independence with ambulation of 100' over an LOS of 21 days.    Will continue to follow. Rehab recommendations are dependent on how functional progress changes as well as how patient continues to participate and tolerate therapeutic interventions, WHICH MAY CHANGE UPON FOLLOW UP EVALUATIONS. Recommend ongoing mobilization by staff to maintain cardiopulmonary function and prevention of secondary complications related to debility. Have discussed the specific rehabilitation management and recommendations above with rehab clinical care team/rehab liaison.      Total Time Spent on Encounter (reviewing clinical notes, labs, radiology, medications, patient history/exam, assessment and plan, , reviewing and signing admission screen for [facility] admission) - 50 minutes
Patient seen and examined. Case discussed with Dr. Reagan and agree with recommendations outlined above. I have personally reviewed the imaging and labs listed above.    Rehab/Impaired mobility and function -  Patient continues to require hospitalization for the above diagnoses and ongoing active management of comorbid complications  that are substantially posing a threat to bodily function, functional ability and quality of life.    RECOMMEND - OOB daily, hourly mobilization exercises provided. Concern for affect/behavior/initiation - recommend TRIAL of AMANTADINE.    RECOMMEND HTN MANAGEMENT    When medically optimized, based on the patient's diagnosis, current functional status and potential for progress, recommend ACUTE inpatient rehabilitation for the functional deficits consisting of 3 hours of therapy/day & 24 hour RN/daily PMR physician for comorbid medical management. Patient will be able to tolerate 3 hours a day.      Will continue to follow. Rehab recommendations are dependent on how functional progress changes as well as how patient continues to participate and tolerate therapeutic interventions, which may change. Recommend ongoing mobilization by staff to maintain cardiopulmonary function and prevention of secondary complications related to debility. Discussed the specific rehabilitation management and recommendations above with rehab clinical care team/rehab liaison.      Total Time Spent on Encounter (reviewing clinical notes, labs, radiology and medications, reviewing patient history, discussing with resident, pre-rounding, physical exam, assessment and discussing rehabilitation options - with consideration of prior level of function, expected level of recovery and return to community living, rounding with team) - 50 minutes .
Patient seen and examined. Case discussed with Dr. Reagan and agree with recommendations outlined above. I have personally reviewed the imaging and labs listed above.    Rehab/Impaired mobility and function -  Patient continues to require hospitalization for the above diagnoses and ongoing active management of comorbid complications  that are substantially posing a threat to bodily function, functional ability and quality of life.    RECOMMEND - OOB daily, hourly mobilization exercises provided. Concern for affect/behavior/initiation - recommend TRIAL of AMANTADINE.    When medically optimized, based on the patient's diagnosis, current functional status and potential for progress, recommend ACUTE inpatient rehabilitation for the functional deficits consisting of 3 hours of therapy/day & 24 hour RN/daily PMR physician for comorbid medical management. Patient will be able to tolerate 3 hours a day.      Will continue to follow. Rehab recommendations are dependent on how functional progress changes as well as how patient continues to participate and tolerate therapeutic interventions, which may change. Recommend ongoing mobilization by staff to maintain cardiopulmonary function and prevention of secondary complications related to debility. Discussed the specific rehabilitation management and recommendations above with rehab clinical care team/rehab liaison.      Total Time Spent on Encounter (reviewing clinical notes, labs, radiology and medications, reviewing patient history, discussing with resident, pre-rounding, physical exam, assessment and discussing rehabilitation options - with consideration of prior level of function, expected level of recovery and return to community living, rounding with team) - 50 minutes .
Patient seen and examined. Case discussed with Dr. Reagan and agree with recommendations outlined above. I have personally reviewed the imaging and labs listed above.    Rehab/Impaired mobility and function -  Patient continues to require hospitalization for the above diagnoses and ongoing active management of comorbid complications  that are substantially posing a threat to bodily function, functional ability and quality of life.    RECOMMEND - OOB daily, hourly mobilization exercises provided. Concern for affect/behavior/initiation - recommend TRIAL of AMANTADINE.    RECOMMEND HTN MANAGEMENT    UNCLEAR IF CURRENT FEVERS CAN CONSIDERED TO BE CENTRAL IF MRI OF BRAIN NOT PERFORMED. RECOMMEND MRI WITH DWI.    When medically optimized, based on the patient's diagnosis, current functional status and potential for progress, recommend ACUTE inpatient rehabilitation for the functional deficits consisting of 3 hours of therapy/day & 24 hour RN/daily PMR physician for comorbid medical management. Patient will be able to tolerate 3 hours a day.      Will continue to follow. Rehab recommendations are dependent on how functional progress changes as well as how patient continues to participate and tolerate therapeutic interventions, which may change. Recommend ongoing mobilization by staff to maintain cardiopulmonary function and prevention of secondary complications related to debility.      Total Time Spent on Encounter (reviewing clinical notes, labs, radiology and medications, reviewing patient history, discussing with resident, pre-rounding, physical exam, assessment and discussing rehabilitation options - with consideration of prior level of function, expected level of recovery and return to community living, rounding with team) - 50 minutes .

## 2024-11-11 NOTE — H&P ADULT - HISTORY OF PRESENT ILLNESS
38 y/o M with no pertinent PMHx originally presents to [facility] on 10/29/24 with c/o chest tightness and pain.  Pt had CTA chest significant for Aortic dissection which is noted from the level of the root of the aorta extending into the ascending, aortic arch, descending thoracic aorta as well as extending into the abdominal aorta and is noted to end in the distal left common iliac artery. The dissection flap is in close proximity to the right coronary artery. The left renal artery is arising from the false lumen. Transferred to [facility] direct to OR for biobental with reconstruction of the RCA with Dr. Grimm on 10/29. Post op course c/b  HTN and seizure (treated with IV ativan, propofol, keppra loaded). CT head/neck no acute findings. Neurology consulted for post op twitching of extremities and left sided facial/arm weakness, dysarthria and left visual neglect. S/p cEEG without acute events, suspicious for right hemisphere structural lesion. Repeat CT Head r/o suspected right frontoparietal stroke, no acute findings (will need f/u MRI 6 weeks postop). Residual left-sided weakness/neglect improving. Patient is optimized and was evaluated by PM&R and therapy for functional deficits, gait/ADL impairments and acute rehabilitation was recommended. Patient was cleared for discharge to [facility] IRU on 11/11/24. Mr. Brennan Schneider is a 39-year-old male patient with no previous past medical history who originally presented to [facility] on 10/29/24 with c/o chest tightness and pain.  Pt had CTA chest significant for Aortic dissection which was noted from the level of the root of the aorta extending into the ascending, aortic arch, descending thoracic aorta as well as extending into the abdominal aorta and was noted to end in the distal left common iliac artery. The dissection flap was in close proximity to the right coronary artery. The left renal artery was arising from the false lumen. He was subsequently transferred to [facility] direct to OR for bio Bentall procedure with reconstruction of the RCA with Dr. Grimm on 10/29/24. Post op course c/b HTN and seizure (treated with IV ativan, propofol, Keppra loaded). CT head/neck no acute findings. Neurology consulted for post op twitching of extremities and left sided facial/arm weakness, dysarthria and left visual neglect. S/p cEEG without acute events, suspicious for right hemisphere structural lesion. Repeat CT Head r/o suspected right frontoparietal stroke, no acute findings (will need f/u MRI 6 weeks postop). Residual left-sided weakness/neglect improving. Patient is optimized and was evaluated by PM&R and therapy for functional deficits, gait/ADL impairments and acute rehabilitation was recommended. Patient was cleared for discharge to [facility] IRF on 11/11/24.

## 2024-11-11 NOTE — DISCHARGE NOTE NURSING/CASE MANAGEMENT/SOCIAL WORK - PATIENT PORTAL LINK FT
You can access the FollowMyHealth Patient Portal offered by [facility] by registering at the following website: [email or web address]. By joining [facility]’s FollowMyHealth portal, you will also be able to view your health information using other applications (apps) compatible with our system.

## 2024-11-11 NOTE — DISCHARGE NOTE NURSING/CASE MANAGEMENT/SOCIAL WORK - NSDCPEFALRISK_GEN_ALL_CORE
For information on Fall & Injury Prevention, visit: [email or web address] OR  [email or web address] OR  https://www.cdc.gov/steadi/patient.html

## 2024-11-11 NOTE — PROGRESS NOTE ADULT - PROBLEM SELECTOR PLAN 2
on keppra   transitioned to PO from IV   cEEG structural abnormal, but no events  neuro following  Recommending MRI brain  Per Dr. Grimm, given pacing wires, will need to wait 6 weeks to get MRI. Neuro agreeable for it to be outpatient
on keppra   transitioned to PO from IV   cEEG structural abnormal, but no events  neuro following  will need eventful f/u head CT, if no findings, likely MRI
on keppra   transitioned to PO from IV   cEEG structural abnormal, but no events  neuro following  will need eventful f/u head CT, if no findings, likely MRI
Multiple fevers noted since surgery  BCx NGTD from 11/4 & 11/6  Most recent fever 11/7  No need for ID c/s per Dr. Grimm  Will continue to monitor temp and follow cultures
Multiple fevers noted since surgery  BCx NGTD from 11/4  Repeat sent 11/6 AM  Most recent fever 11/7  Will consider ID consult if fevers persist
Multiple fevers noted since surgery  BCx NGTD from 11/4 & 11/7  Most recent fever 11/7  No need for ID c/s per Dr. Grimm  Will continue to monitor temp and follow cultures
Multiple fevers noted since surgery  BCx NGTD from 11/4 & 11/7  UA neg  Most recent fever 11/7  No need for ID c/s per Dr. Grimm  Will continue to monitor temp and follow cultures
on keppra   transitioned to PO from IV   neuro following

## 2024-11-11 NOTE — PROGRESS NOTE ADULT - PROBLEM SELECTOR PROBLEM 1
Aortic dissection

## 2024-11-11 NOTE — DISCHARGE NOTE PROVIDER - CARE PROVIDER_API CALL
Lazaro Grimm  Thoracic and Cardiac Surgery  [address] [place] [address]  Phone: [phone]  Fax: [phone]  Follow Up Time:     Keven Leach  Neurology  [address], [address]  [place] [address]  Phone: [phone]  Fax: [phone]  Follow Up Time: 1 month

## 2024-11-11 NOTE — PROGRESS NOTE ADULT - PROBLEM SELECTOR PLAN 1
s/p BioBental, reconstruction RCA on 10/29 - taken emergently to OR  postop slow to wake, developed isolated seizure  Neuro following  no known risk factors- will need further work up outpatient   incentive use, monitor I&Os, daily standing weights  daily Lasix, ASA, multimodal pain control  /Tele  PT as tolerated - will need acute rehab   pain control prn  dispo for AR when medically stable  Case to be discussed with Dr. Grimm in CT surgery AM rounds
s/p BioBental, reconstruction RCA on 10/29 - taken emergently to OR  postop slow to wake, developed isolated seizure, no recovering  Neuro following  dispo for AR when medically stable  no known risk factors- will need further work up outpatient   incentive use, monitor I&Os, daily standing weights  daily Lasix, ASA, multimodal pain control  /Tele  PT as tolerated - will need acute rehab PMR pending   pain control prn
s/p BioBental, reconstruction RCA on 10/29 - taken emergently to OR  postop slow to wake, developed isolated seizure, no recovering  Neuro following  dispo for AR when medically stable  no known risk factors- will need further work up outpatient   incentive use, monitor I&Os, daily standing weights  daily Lasix, ASA, multimodal pain control  /Tele  PT as tolerated - will need acute rehab PMR pending   pain control prn
s/p BioBental, reconstruction RCA on 10/29 - taken emergently to OR  postop slow to wake, developed isolated seizure  Neuro following  no known risk factors- will need further work up outpatient   incentive use, monitor I&Os, daily standing weights  daily Lasix, ASA, multimodal pain control  /Tele  PT as tolerated - will need acute rehab   pain control prn  dispo for AR when medically stable  Case to be discussed with Dr. Grimm in CT surgery AM rounds
s/p BioBental, reconstruction RCA on 10/29 - taken emergently to OR  postop slow to wake, developed isolated seizure  Neuro following  no known risk factors - will need further work up outpatient  incentive use, monitor I&Os, daily standing weights  daily Lasix, ASA, beta blocker, multimodal pain control  /Tele  PT as tolerated - pending AR, medically cleared for discharge   pain control prn  Case to be discussed with Dr. Grimm in CT surgery AM rounds
s/p BioBental, reconstruction RCA on 10/29 - taken emergently to OR  postop slow to wake, developed isolated seizure  Neuro following  no known risk factors- will need further work up outpatient   incentive use, monitor I&Os, daily standing weights  daily Lasix, ASA, multimodal pain control  /Tele  PT as tolerated - pending AR, medically cleared for discharge   pain control prn  Case to be discussed with Dr. Grimm in CT surgery AM rounds
started on lopressor and clonodine   s.p repair   no known risk factors- will need further work up outpatient   PT as tolerated - will need acute rehab PMR pending   pain control prn
s/p BioBental, reconstruction RCA on 10/29 - taken emergently to OR  postop slow to wake, developed isolated seizure  Neuro following  no known risk factors - will need further work up outpatient  incentive use, monitor I&Os, daily standing weights  daily Lasix, ASA, beta blocker, multimodal pain control  /Tele  PT as tolerated - pending AR, medically cleared for discharge   pain control prn  Case to be discussed with Dr. Grimm in CT surgery AM rounds

## 2024-11-12 PROBLEM — Z78.9 OTHER SPECIFIED HEALTH STATUS: Chronic | Status: ACTIVE | Noted: 2024-10-29

## 2024-11-12 LAB
ALBUMIN SERPL ELPH-MCNC: 2.4 G/DL — LOW (ref 3.3–5)
ALP SERPL-CCNC: 218 U/L — HIGH (ref 40–120)
ALT FLD-CCNC: 257 U/L — HIGH (ref 10–45)
ANION GAP SERPL CALC-SCNC: 3 MMOL/L — LOW (ref 5–17)
AST SERPL-CCNC: 72 U/L — HIGH (ref 10–40)
BASOPHILS # BLD AUTO: 0.11 K/UL — SIGNIFICANT CHANGE UP (ref 0–0.2)
BASOPHILS NFR BLD AUTO: 0.7 % — SIGNIFICANT CHANGE UP (ref 0–2)
BILIRUB SERPL-MCNC: 0.6 MG/DL — SIGNIFICANT CHANGE UP (ref 0.2–1.2)
BUN SERPL-MCNC: 13 MG/DL — SIGNIFICANT CHANGE UP (ref 7–23)
CALCIUM SERPL-MCNC: 8.9 MG/DL — SIGNIFICANT CHANGE UP (ref 8.4–10.5)
CHLORIDE SERPL-SCNC: 101 MMOL/L — SIGNIFICANT CHANGE UP (ref 96–108)
CO2 SERPL-SCNC: 33 MMOL/L — HIGH (ref 22–31)
CREAT SERPL-MCNC: 1.08 MG/DL — SIGNIFICANT CHANGE UP (ref 0.5–1.3)
CULTURE RESULTS: SIGNIFICANT CHANGE UP
CULTURE RESULTS: SIGNIFICANT CHANGE UP
EGFR: 90 ML/MIN/1.73M2 — SIGNIFICANT CHANGE UP
EOSINOPHIL # BLD AUTO: 0.4 K/UL — SIGNIFICANT CHANGE UP (ref 0–0.5)
EOSINOPHIL NFR BLD AUTO: 2.4 % — SIGNIFICANT CHANGE UP (ref 0–6)
GLUCOSE SERPL-MCNC: 118 MG/DL — HIGH (ref 70–99)
HCT VFR BLD CALC: 29.9 % — LOW (ref 39–50)
HGB BLD-MCNC: 9.7 G/DL — LOW (ref 13–17)
IMM GRANULOCYTES NFR BLD AUTO: 4.4 % — HIGH (ref 0–0.9)
LYMPHOCYTES # BLD AUTO: 14.3 % — SIGNIFICANT CHANGE UP (ref 13–44)
LYMPHOCYTES # BLD AUTO: 2.4 K/UL — SIGNIFICANT CHANGE UP (ref 1–3.3)
MCHC RBC-ENTMCNC: 28.4 PG — SIGNIFICANT CHANGE UP (ref 27–34)
MCHC RBC-ENTMCNC: 32.4 G/DL — SIGNIFICANT CHANGE UP (ref 32–36)
MCV RBC AUTO: 87.4 FL — SIGNIFICANT CHANGE UP (ref 80–100)
MONOCYTES # BLD AUTO: 1.76 K/UL — HIGH (ref 0–0.9)
MONOCYTES NFR BLD AUTO: 10.5 % — SIGNIFICANT CHANGE UP (ref 2–14)
NEUTROPHILS # BLD AUTO: 11.36 K/UL — HIGH (ref 1.8–7.4)
NEUTROPHILS NFR BLD AUTO: 67.7 % — SIGNIFICANT CHANGE UP (ref 43–77)
NRBC # BLD: 0 /100 WBCS — SIGNIFICANT CHANGE UP (ref 0–0)
PLATELET # BLD AUTO: 537 K/UL — HIGH (ref 150–400)
POTASSIUM SERPL-MCNC: 4 MMOL/L — SIGNIFICANT CHANGE UP (ref 3.5–5.3)
POTASSIUM SERPL-SCNC: 4 MMOL/L — SIGNIFICANT CHANGE UP (ref 3.5–5.3)
PROT SERPL-MCNC: 6.8 G/DL — SIGNIFICANT CHANGE UP (ref 6–8.3)
RBC # BLD: 3.42 M/UL — LOW (ref 4.2–5.8)
RBC # FLD: 14.3 % — SIGNIFICANT CHANGE UP (ref 10.3–14.5)
SARS-COV-2 RNA SPEC QL NAA+PROBE: SIGNIFICANT CHANGE UP
SODIUM SERPL-SCNC: 137 MMOL/L — SIGNIFICANT CHANGE UP (ref 135–145)
SPECIMEN SOURCE: SIGNIFICANT CHANGE UP
SPECIMEN SOURCE: SIGNIFICANT CHANGE UP
WBC # BLD: 16.76 K/UL — HIGH (ref 3.8–10.5)
WBC # FLD AUTO: 16.76 K/UL — HIGH (ref 3.8–10.5)

## 2024-11-12 PROCEDURE — 99255 IP/OBS CONSLTJ NEW/EST HI 80: CPT

## 2024-11-12 PROCEDURE — 99223 1ST HOSP IP/OBS HIGH 75: CPT

## 2024-11-12 PROCEDURE — 99233 SBSQ HOSP IP/OBS HIGH 50: CPT

## 2024-11-12 PROCEDURE — 76705 ECHO EXAM OF ABDOMEN: CPT | Mod: 26

## 2024-11-12 RX ADMIN — PANTOPRAZOLE SODIUM 40 MILLIGRAM(S): 40 TABLET, DELAYED RELEASE ORAL at 06:33

## 2024-11-12 RX ADMIN — CLONIDINE HYDROCHLORIDE 0.1 MILLIGRAM(S): 0.3 TABLET ORAL at 13:53

## 2024-11-12 RX ADMIN — METOPROLOL TARTRATE 50 MILLIGRAM(S): 100 TABLET, FILM COATED ORAL at 06:33

## 2024-11-12 RX ADMIN — CLONIDINE HYDROCHLORIDE 0.1 MILLIGRAM(S): 0.3 TABLET ORAL at 21:16

## 2024-11-12 RX ADMIN — POTASSIUM CHLORIDE 20 MILLIEQUIVALENT(S): 600 TABLET, EXTENDED RELEASE ORAL at 11:06

## 2024-11-12 RX ADMIN — LEVETIRACETAM 1000 MILLIGRAM(S): 1000 TABLET ORAL at 17:09

## 2024-11-12 RX ADMIN — ENOXAPARIN SODIUM 40 MILLIGRAM(S): 30 INJECTION SUBCUTANEOUS at 21:18

## 2024-11-12 RX ADMIN — CLONIDINE HYDROCHLORIDE 0.1 MILLIGRAM(S): 0.3 TABLET ORAL at 06:34

## 2024-11-12 RX ADMIN — METHOCARBAMOL 750 MILLIGRAM(S): 500 TABLET, FILM COATED ORAL at 06:35

## 2024-11-12 RX ADMIN — POLYETHYLENE GLYCOL 3350 17 GRAM(S): 17 POWDER, FOR SOLUTION ORAL at 11:06

## 2024-11-12 RX ADMIN — LEVETIRACETAM 1000 MILLIGRAM(S): 1000 TABLET ORAL at 06:34

## 2024-11-12 RX ADMIN — FUROSEMIDE 40 MILLIGRAM(S): 40 TABLET ORAL at 06:34

## 2024-11-12 RX ADMIN — METOPROLOL TARTRATE 50 MILLIGRAM(S): 100 TABLET, FILM COATED ORAL at 17:09

## 2024-11-12 RX ADMIN — METHOCARBAMOL 750 MILLIGRAM(S): 500 TABLET, FILM COATED ORAL at 13:54

## 2024-11-12 RX ADMIN — METHOCARBAMOL 750 MILLIGRAM(S): 500 TABLET, FILM COATED ORAL at 21:17

## 2024-11-12 RX ADMIN — Medication 81 MILLIGRAM(S): at 11:06

## 2024-11-12 NOTE — DIETITIAN INITIAL EVALUATION ADULT - FACTORS AFF FOOD INTAKE
States Fair-Good Intake over Last Week  No Significant Decrease in Meal Consumption (Per Patient)/none

## 2024-11-12 NOTE — DIETITIAN INITIAL EVALUATION ADULT - ORAL INTAKE PTA/DIET HISTORY
Patient Does Not Follow Diet @Home But Tries to Limit Fat & Increased Protien  Consumes 3 Meals a Day   Usually Orders Takeout/Delivery   Doesn't Take Vitamin/Supplements @Home

## 2024-11-12 NOTE — CONSULT NOTE ADULT - SUBJECTIVE AND OBJECTIVE BOX
HPI  39 year old male with no pertinent PMHx who originally presented to [facility] on 10/29/24 with c/o chest tightness and pain.  Pt had CTA chest significant for Aortic dissection which is noted from the level of the root of the aorta extending into the ascending, aortic arch, descending thoracic aorta as well as extending into the abdominal aorta and is noted to end in the distal left common iliac artery. The dissection flap is in close proximity to the right coronary artery. The left renal artery is arising from the false lumen. Transferred to [facility] direct to OR s/p repair of type A aortic dissection w/ BioBentall procedure and reconstruction of the RCA with Dr. Grimm on 10/29. Post op course c/b  HTN and seizure (treated with IV ativan, propofol, keppra loaded). CT head/neck no acute findings. Neurology consulted for post op twitching of extremities and left sided facial/arm weakness, dysarthria and left visual neglect. S/p cEEG without acute events, suspicious for right hemisphere structural lesion. Repeat CT Head r/o suspected right frontoparietal stroke, no acute findings (will need f/u MRI 6 weeks postop). Residual left-sided weakness/neglect improving. Patient is optimized and was evaluated by PM&R and therapy for functional deficits, gait/ADL impairments and acute rehabilitation was recommended. Patient was cleared for discharge to [facility] IRU on 11/11/24.    Patient seen and examined at bedside. No acute events noted.   Patient denies any acute complaints, pain is controlled. Denies chest pain/SOB/palpitation, no N/V/abdominal pain. Rest of ROS are negative.     ALLERGIES:  Allergy Status Unknown  No Known Allergies    PAST MEDICAL HISTORY:  No pertinent past medical history     PAST SURGICAL HISTORY:  No significant past surgical history     SOCIAL HISTORY  Smoking - Denies  EtOH - 1-2 beers/week  Drugs - Denies    MEDICATIONS  (STANDING):  aspirin  chewable 81 milliGRAM(s) Oral daily  cloNIDine 0.1 milliGRAM(s) Oral three times a day  enoxaparin Injectable 40 milliGRAM(s) SubCutaneous every 24 hours  furosemide    Tablet 40 milliGRAM(s) Oral daily  influenza   Vaccine 0.5 milliLiter(s) IntraMuscular once  levETIRAcetam 1000 milliGRAM(s) Oral two times a day  methocarbamol 750 milliGRAM(s) Oral three times a day  metoprolol tartrate 50 milliGRAM(s) Oral two times a day  pantoprazole    Tablet 40 milliGRAM(s) Oral before breakfast  polyethylene glycol 3350 17 Gram(s) Oral daily  potassium chloride    Tablet ER 20 milliEquivalent(s) Oral daily  senna 2 Tablet(s) Oral at bedtime    MEDICATIONS  (PRN):  acetaminophen  Tablet .. 975 milliGRAM(s) Oral every 8 hours PRN Temp greater or equal to 38C (100.4F), Mild Pain (1 - 3), Moderate Pain (4 - 6), Severe Pain (7 - 10)  melatonin 6 milliGRAM(s) Oral at bedtime PRN Insomnia    Vital Signs Last 24 Hrs  T(F): 98.9 (11 Nov 2024 20:30), Max: 99 (11 Nov 2024 12:54)  HR: 92 (12 Nov 2024 06:30) (67 - 92)  BP: 137/66 (12 Nov 2024 06:30) (118/54 - 150/81)  RR: 16 (11 Nov 2024 20:30) (16 - 18)  SpO2: 94% (11 Nov 2024 20:30) (91% - 96%)  I&O's Summary    PHYSICAL EXAM:  GENERAL: NAD  HEENT: NCAT  CHEST/LUNG: Clear to percussion bilaterally; No rales, rhonchi, wheezing  HEART: Regular rate and rhythm; No murmurs, median sternotomy incision healing well   ABDOMEN: Soft, Nontender, Nondistended; Bowel sounds present  MUSCULOSKELETAL/EXTREMITIES:  2+ Peripheral Pulses, No LE edema  PSYCH: Appropriate affect  NEURO: Alert & Oriented x 3, no facial asymmetry, 5/5 UE/LE     I personally reviewed the below data/images/labs:    LABS:                        9.7    16.76 )-----------( 537      ( 12 Nov 2024 05:50 )             29.9       11-12    137  |  101  |  13  ----------------------------<  118  4.0   |  33  |  1.08    Ca    8.9      12 Nov 2024 05:50  Mg     2.3     11-11    TPro  6.8  /  Alb  2.4  /  TBili  0.6  /  DBili  x   /  AST  72  /  ALT  257  /  AlkPhos  218  11-12    Urinalysis Basic - ( 12 Nov 2024 05:50 )    Color: x / Appearance: x / SG: x / pH: x  Gluc: 118 mg/dL / Ketone: x  / Bili: x / Urobili: x   Blood: x / Protein: x / Nitrite: x   Leuk Esterase: x / RBC: x / WBC x   Sq Epi: x / Non Sq Epi: x / Bacteria: x    Culture - Blood (collected 07 Nov 2024 07:07)  Source: .Blood BLOOD  Preliminary Report (11 Nov 2024 14:01):    No growth at 4 days    Culture - Blood (collected 07 Nov 2024 07:00)  Source: .Blood BLOOD  Preliminary Report (11 Nov 2024 14:01):    No growth at 4 days      COVID-19 PCR: NotDetec (11-11-24 @ 22:41)      Consultant(s) Notes Reviewed:   Care Discused with Consultants/Other Providers:  Imaging Personally Reviewed:

## 2024-11-12 NOTE — CONSULT NOTE ADULT - ASSESSMENT
39 year old male with no pertinent PMHx originally presents to [facility] on 10/29/24 with c/o chest tightness and pain. Found to have aortic dissection beginning at the root of the aorta extending into the ascending, aortic arch, descending thoracic aorta as well as the abdominal aorta and ends in the distal left common iliac artery. The dissection flap is in close proximity to the right coronary artery. The left renal artery is arising from the false lumen., transferred to [facility] for Bio-bentall  with reconstruction of the RCA with Dr. Grimm on 10/29. Post op course c/b seizure and suspected CVA.  Now admitted to [facility] with left sided decificts  for initiation of a multidisciplinary rehab program consisting focused on functional mobility, transfers and ADLs.    #Aortic Dissection s/p BioBentall Procedure and Reconstruction of RCA 10/29  -WBAT, Sternal Precautions   -Continue aspirin  -Continue Lasix   -Continue metoprolol   -Pain control and bowel regimen per rehab team  -Comprehensive rehab program with PT/OT   -Outpatient follow up with CTS for further workup     #Suspected CVA  #Left Hemiparesis (Improved)  -Post-op left sided facial/arm weakness, dysarthria and left visual neglect  -CTH 10/31 showed no acute intracranial pathology. CTA Head and neck showed no flow limiting stenosis or vascular aneurysm or AVM. No venous venous thrombosis.   -Repeat CTH 11/6 showed no acute intracranial pathology  -Seen by neurology during his hospitalization, right frontoparietal stroke suspected, recommended MRI once safe from CTS perspective (to be done in 6 weeks given pacing wires).   -Fall precaution  -Comprehensive rehab program with PT/OT/SLP  -Outpatient follow up with neurology     #Post-Op Seizure  -s/p cEEG structural abnormal, but no events  -Outpatient MRI brain as above  -Continue keppra  -Seizure precaution  -Outpatient neurology for MRI as above     #Post Fever w/ Leukocytosis  -Infectious workup: UA negative (11/5), CXR negative (11/11), COVID negative, blood cultures 11/4 and 11/7 negative  -Has been afebrile here  -Leukocytosis has downtrended and stable, possibly reactive  -Continue to monitor CBC and for recurrent fever    #HTN  -Continue clonidine and metoprolol   -Monitor BP     #Post-Op Anemia  -H/H stable  -Monitor CBC    #GI PPx  -PPI    #DVT PPx  -Lovenox SC     Discussed with rehab team

## 2024-11-12 NOTE — CONSULT NOTE ADULT - TIME BILLING
Time spent includes direct patient care (interview and examination of patient), discussion with other providers, support staff and/or patient's family members, review of medical records, ordering diagnostic tests and analyzing results, and documentation
16.7

## 2024-11-12 NOTE — DIETITIAN INITIAL EVALUATION ADULT - NS FNS DIET ORDER
on DASH-TLC Diet w/ Thin Liquids (IDDSI Level 0)  Recommend Change Diet to Low Sodium Diet   Nutrition Education Provided on Low Sodium Diet & Proper Nutrition

## 2024-11-12 NOTE — DIETITIAN INITIAL EVALUATION ADULT - EDUCATION DIETARY MODIFICATIONS
Nutrition Education Provided on Low Sodium Diet & Proper Nutrition/(2) meets goals/outcomes/verbalization

## 2024-11-12 NOTE — DIETITIAN INITIAL EVALUATION ADULT - PERTINENT MEDS FT
MEDICATIONS  (STANDING):  aspirin  chewable 81 milliGRAM(s) Oral daily  cloNIDine 0.1 milliGRAM(s) Oral three times a day  enoxaparin Injectable 40 milliGRAM(s) SubCutaneous every 24 hours  furosemide    Tablet 40 milliGRAM(s) Oral daily  influenza   Vaccine 0.5 milliLiter(s) IntraMuscular once  levETIRAcetam 1000 milliGRAM(s) Oral two times a day  methocarbamol 750 milliGRAM(s) Oral three times a day  metoprolol tartrate 50 milliGRAM(s) Oral two times a day  pantoprazole    Tablet 40 milliGRAM(s) Oral before breakfast  polyethylene glycol 3350 17 Gram(s) Oral daily  potassium chloride    Tablet ER 20 milliEquivalent(s) Oral daily  senna 2 Tablet(s) Oral at bedtime    MEDICATIONS  (PRN):  acetaminophen     Tablet .. 975 milliGRAM(s) Oral every 8 hours PRN Temp greater or equal to 38C (100.4F), Mild Pain (1 - 3), Moderate Pain (4 - 6), Severe Pain (7 - 10)  melatonin 6 milliGRAM(s) Oral at bedtime PRN Insomnia

## 2024-11-12 NOTE — DIETITIAN INITIAL EVALUATION ADULT - ENTER TO (G/KG)
Chief complaint:   Chief Complaint   Patient presents with   • Follow-up     3 weeks       Vitals:  Visit Vitals  /78 (BP Location: LUE - Left upper extremity, Patient Position: Sitting, Cuff Size: Regular)   Pulse 84   Ht 5' 3\" (1.6 m)   Wt 63 kg   LMP 08/02/2013   BMI 24.59 kg/m²       HISTORY OF PRESENT ILLNESS     HPI     Patient is 67 year old female coming in for a follow up of her diabetes.    Diabetes type 2:  Patient's Diabetes Type 2 is uncontrolled  Hemoglobin A1C (%)   Date Value   06/17/2020 8.6 (H)   09/17/2019 5.6   Reports no hypoglycemic episodes.  Patient denies any polyuria, polydipsia, visual changes, tingling or numbness in the extremities.  She did not bring a log of her FSG readings but states that her morning glucose readings are in the 140-150's and       Other significant problems:  Patient Active Problem List    Diagnosis Date Noted   • Chronic systolic congestive heart failure (CMS/[facility]) 03/13/2020     Priority: High     On metoprolol 25 mg po bid.  Cardiology following.  Echo March 2020  Severe LV and RV systolic dysfunction  LVEF = 30%  Elevated PAEDP and PASP  Moderate ME and AR       • Dementia without behavioral disturbance (CMS/[facility]) 02/24/2020     Priority: High     On donepezil 5 mg      • Recurrent major depressive disorder (CMS/[facility]) 03/10/2020     Priority: Medium     On wellbutrin  mg po daily.  May try to taper in future.     • Generalized anxiety disorder 03/10/2020     Priority: Medium     Was on buspirone 10 mg po bid.     • Alcohol use disorder, mild, abuse 10/22/2019     Priority: Medium     No alcohol since mid feb 2020     • Essential hypertension 12/06/2017     Priority: Medium     On metoprolol 25 mg po bid.     • OAB (overactive bladder) 03/15/2017     Priority: Medium     myrbetriq ER 25 mg by mouth daily.     • Mixed hyperlipidemia 03/15/2017     Priority: Medium     On atorvastatin 40 mg po daily.     • DM (diabetes mellitus) type II controlled with  renal manifestation (CMS/[facility]) 11/04/2015     Priority: Medium     On metformin 1000 mg po bid. On lantus +SSI humalog.     • Hypothyroidism (acquired) 08/05/2013     Priority: Medium     On levothyroxine 112 mcg po daily.     • UTI (urinary tract infection) due to Enterococcus 03/16/2020     Priority: Low   • Cardiac arrest (CMS/[facility]) 03/14/2020     Priority: Low   • Cardiogenic shock (CMS/[facility]) 03/14/2020     Priority: Low   • Hypokalemia 03/14/2020     Priority: Low   • Troponin level elevated 03/13/2020     Priority: Low   • Hypotension 03/13/2020     Priority: Low   • Sepsis (CMS/HCC) 03/13/2020     Priority: Low   • Generalized abdominal pain 03/13/2020     Priority: Low   • Pleural effusion, bilateral 03/13/2020     Priority: Low   • Other ascites 03/13/2020     Priority: Low   • Transaminitis 03/13/2020     Priority: Low   • Nonrheumatic tricuspid valve regurgitation 03/13/2020     Priority: Low   • Fatty liver 03/13/2020     Priority: Low   • Macrocytic anemia 03/13/2020     Priority: Low   • Noncompliance with medication regimen 10/22/2019     Priority: Low   • Severe protein-calorie malnutrition (CMS/[facility]) 10/22/2019     Priority: Low   • Normocytic anemia 09/17/2019     Priority: Low   • Benzodiazepine overdose of undetermined intent 05/30/2019     Priority: Low   • Psychogenic polydipsia 06/20/2018     Priority: Low   • Chronic back pain greater than 3 months duration 06/20/2018     Priority: Low   • Atherosclerosis of native coronary artery of native heart without angina pectoris 03/23/2018     Priority: Low     Stress 04/10/2018: 0.4 mg IV Regadenoson/Lexiscan protocol is used.  Rhythm at baseline is sinus, with PAC. Patient tolerated the infusion well, with minor nonspecific adverse effects.  No ischemic symptoms or ischemic ECG changes noted. Myocardial perfusion scan reveals no definite areas of ischemia, anteroapical fixed defect most suggestive of breast-induced attenuation artifact, EF is >70  %.        • Diastolic dysfunction 12/14/2017     Priority: Low   • Moderate persistent asthma without complication 03/15/2017     Priority: Low   • Knee joint replacement by other means 02/06/2015     Priority: Low   • COAG (chronic open-angle glaucoma) 01/13/2015     Priority: Low   • Lumbago 12/23/2014     Priority: Low   • Degeneration of intervertebral disc, site unspecified 12/23/2014     Priority: Low   • Bilateral incipient cataracts 09/10/2014     Priority: Low   • Tobacco dependence 09/10/2014     Priority: Low   • Alcohol abuse, unspecified 09/30/2013     Priority: Low   • Nonspecific reaction to 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          PAST MEDICAL, FAMILY AND SOCIAL HISTORY     Medications:  Current Outpatient Medications   Medication   • metformin (GLUCOPHAGE) 1000 MG tablet   • acetaminophen (TYLENOL) 500 MG tablet   • Menthol, Topical Analgesic, (BIOFREEZE) 4 % Gel   • insulin glargine (LANTUS) 100 UNIT/ML vial solution   • insulin regular, human, (HUMULIN R, NOVOLIN R) 100 UNIT/ML sliding scale injection   • DISPENSE   • folic acid (FOLATE) 1 MG tablet   • donepezil (ARICEPT) 5 MG tablet   • furosemide (LASIX) 40 MG tablet   • metoPROLOL succinate (TOPROL-XL) 25 MG 24 hr tablet   • atorvastatin (LIPITOR) 40 MG tablet   • buPROPion (WELLBUTRIN XL) 300 MG 24 hr tablet   • levothyroxine 112 MCG tablet   • fluticasone propionate (FLOVENT HFA) 220 MCG/ACT inhaler   • docusate sodium-sennosides (SENOKOT S) 50-8.6 MG per tablet   • latanoprost (XALATAN) 0.005 % ophthalmic solution   • magnesium lactate (MAG-TAB SR) 84 MG (7MEQ) Tab CR     No current facility-administered medications for this visit.        Allergies:  ALLERGIES:   Allergen Reactions   • Aspirin Nausea & Vomiting   • Codeine NAUSEA and Other (See Comments)     \"makes chest tight\"   • Lactose Intolerance (No Food Restrictions)    (Food) GI UPSET     Patient states can  tolerate many things with lactose; is appropriate for lactose intolerance no food restrictions.   • Lisinopril SWELLING     facial       Past Medical  History/Surgeries:  Past Medical History:   Diagnosis Date   • Acute encephalopathy 5/30/2019   • Acute hypoxemic respiratory failure (CMS/[facility]) 3/14/2020   • Acute kidney injury (CMS/[facility]) 4/13/2015   • Acute psychosis (CMS/[facility]) 12/17/2018   • Alcohol use disorder, mild, abuse 10/22/2019   • Anxiety    • Asthma    • Depression    • Diabetes mellitus (CMS/[facility])    • Essential (primary) hypertension    • Gastroesophageal reflux disease    • Hyperlipidemia    • Hypothyroidism    • Insomnia    • Noncompliance with medication regimen 10/22/2019   • Pneumonia of right lung due to infectious organism 8/5/2019   • Septic shock (CMS/[facility]) 7/6/2018   • Thyroid condition        Past Surgical History:   Procedure Laterality Date   • Biopsy of thyroid,percut  06/12/2003   • Cholecystectomy     • Colonoscopy diagnostic  08/30/2005   • Hysterectomy     • Joint replacement      Bilateral knee replacement    • Occult blood test tube  08/21/2014   • Removal gallbladder         Family History:  Family History   Problem Relation Age of Onset   • Diabetes Mother    • Hypertension Mother    • Diabetes Brother    • Heart disease Brother         CAD   • Hypertension Brother    • Hypertension Brother    • Heart disease Brother         CAD   • Myocardial Infarction Sister    • Aneurysm Brother        Social History:  Social History     Tobacco Use   • Smoking status: Former Smoker     Packs/day: 0.50     Years: 42.00     Pack years: 21.00     Types: Cigarettes     Start date: 1/1/1977   • Smokeless tobacco: Never Used   • Tobacco comment: trying to cut down and \"fix that\"   Substance Use Topics   • Alcohol use: Yes     Alcohol/week: 3.0 standard drinks     Types: 3 Cans of beer per week     Frequency: 2-3 times a week     Drinks per session: 3 or 4     Binge frequency: Monthly       REVIEW OF  SYSTEMS     Review of Systems   Constitutional: Negative for activity change, appetite change, diaphoresis, fatigue and fever.   Eyes: Negative for photophobia and visual disturbance.   Respiratory: Negative for cough, shortness of breath and wheezing.    Cardiovascular: Negative for chest pain and palpitations.   Gastrointestinal: Negative for abdominal pain, constipation, diarrhea, nausea and vomiting.   Endocrine: Negative for polydipsia, polyphagia and polyuria.       PHYSICAL EXAM     Physical Exam  Vitals signs reviewed.         ASSESSMENT/PLAN         DM (diabetes mellitus) type II controlled with renal manifestation (CMS/HCC)  Increase lantus to 12 units. Continue on SSI humalog and metformin 1000 mg po bid.    Follow up in 1-2 weeks with FSG readings.   1.1

## 2024-11-12 NOTE — DIETITIAN INITIAL EVALUATION ADULT - PERTINENT LABORATORY DATA
11-12    137  |  101  |  13  ----------------------------<  118[H]  4.0   |  33[H]  |  1.08    Ca    8.9      12 Nov 2024 05:50  Mg     2.3     11-11    TPro  6.8  /  Alb  2.4[L]  /  TBili  0.6  /  DBili  x   /  AST  72[H]  /  ALT  257[H]  /  AlkPhos  218[H]  11-12  A1C with Estimated Average Glucose Result: 5.3 % (11-01-24 @ 02:05)

## 2024-11-12 NOTE — DIETITIAN INITIAL EVALUATION ADULT - OTHER INFO
Initial Nutrition Assessment   39yr Old Male   Denies Food Allergy/Intolerance  Tolerates Diet Well - No Chewing/Swallowing Complications (Per Patient)  Surgical Incision on Right Groin, Right Clavicle & Sternum and No Pressure Ulcers (as Per Nursing Flow Sheets)  No Edema Noted (as Per Nursing Flow Sheets)  No Recent Nausea/Vomiting/Diarrhea/Constipation (as Per Patient)

## 2024-11-12 NOTE — DIETITIAN INITIAL EVALUATION ADULT - ADD RECOMMEND
1) Monitor Weights, Intake, Tolerance, Skin & Labwork  2) Recommend Change Diet to Low Sodium Diet  3) Nutrition Education Provided on Low Sodium Diet & Proper Nutrition   4) Continue Nutrition Plan of Care

## 2024-11-13 LAB
ALBUMIN SERPL ELPH-MCNC: 2.5 G/DL — LOW (ref 3.3–5)
ALP SERPL-CCNC: 237 U/L — HIGH (ref 40–120)
ALT FLD-CCNC: 251 U/L — HIGH (ref 10–45)
ANION GAP SERPL CALC-SCNC: 8 MMOL/L — SIGNIFICANT CHANGE UP (ref 5–17)
AST SERPL-CCNC: 70 U/L — HIGH (ref 10–40)
BASOPHILS # BLD AUTO: 0.13 K/UL — SIGNIFICANT CHANGE UP (ref 0–0.2)
BASOPHILS NFR BLD AUTO: 0.8 % — SIGNIFICANT CHANGE UP (ref 0–2)
BILIRUB SERPL-MCNC: 0.6 MG/DL — SIGNIFICANT CHANGE UP (ref 0.2–1.2)
BUN SERPL-MCNC: 15 MG/DL — SIGNIFICANT CHANGE UP (ref 7–23)
CALCIUM SERPL-MCNC: 8.9 MG/DL — SIGNIFICANT CHANGE UP (ref 8.4–10.5)
CHLORIDE SERPL-SCNC: 101 MMOL/L — SIGNIFICANT CHANGE UP (ref 96–108)
CO2 SERPL-SCNC: 29 MMOL/L — SIGNIFICANT CHANGE UP (ref 22–31)
CREAT SERPL-MCNC: 1.19 MG/DL — SIGNIFICANT CHANGE UP (ref 0.5–1.3)
EGFR: 81 ML/MIN/1.73M2 — SIGNIFICANT CHANGE UP
EOSINOPHIL # BLD AUTO: 0.39 K/UL — SIGNIFICANT CHANGE UP (ref 0–0.5)
EOSINOPHIL NFR BLD AUTO: 2.5 % — SIGNIFICANT CHANGE UP (ref 0–6)
GLUCOSE SERPL-MCNC: 111 MG/DL — HIGH (ref 70–99)
HAV IGM SER-ACNC: SIGNIFICANT CHANGE UP
HBV CORE IGM SER-ACNC: SIGNIFICANT CHANGE UP
HBV SURFACE AG SER-ACNC: SIGNIFICANT CHANGE UP
HCT VFR BLD CALC: 30.7 % — LOW (ref 39–50)
HCV AB S/CO SERPL IA: 0.21 S/CO — SIGNIFICANT CHANGE UP (ref 0–0.99)
HCV AB SERPL-IMP: SIGNIFICANT CHANGE UP
HGB BLD-MCNC: 10.1 G/DL — LOW (ref 13–17)
IMM GRANULOCYTES NFR BLD AUTO: 4 % — HIGH (ref 0–0.9)
LYMPHOCYTES # BLD AUTO: 13.8 % — SIGNIFICANT CHANGE UP (ref 13–44)
LYMPHOCYTES # BLD AUTO: 2.13 K/UL — SIGNIFICANT CHANGE UP (ref 1–3.3)
MCHC RBC-ENTMCNC: 28.6 PG — SIGNIFICANT CHANGE UP (ref 27–34)
MCHC RBC-ENTMCNC: 32.9 G/DL — SIGNIFICANT CHANGE UP (ref 32–36)
MCV RBC AUTO: 87 FL — SIGNIFICANT CHANGE UP (ref 80–100)
MONOCYTES # BLD AUTO: 1.77 K/UL — HIGH (ref 0–0.9)
MONOCYTES NFR BLD AUTO: 11.5 % — SIGNIFICANT CHANGE UP (ref 2–14)
NEUTROPHILS # BLD AUTO: 10.34 K/UL — HIGH (ref 1.8–7.4)
NEUTROPHILS NFR BLD AUTO: 67.4 % — SIGNIFICANT CHANGE UP (ref 43–77)
NRBC # BLD: 0 /100 WBCS — SIGNIFICANT CHANGE UP (ref 0–0)
PLATELET # BLD AUTO: 529 K/UL — HIGH (ref 150–400)
POTASSIUM SERPL-MCNC: 4.3 MMOL/L — SIGNIFICANT CHANGE UP (ref 3.5–5.3)
POTASSIUM SERPL-SCNC: 4.3 MMOL/L — SIGNIFICANT CHANGE UP (ref 3.5–5.3)
PROT SERPL-MCNC: 7 G/DL — SIGNIFICANT CHANGE UP (ref 6–8.3)
RBC # BLD: 3.53 M/UL — LOW (ref 4.2–5.8)
RBC # FLD: 14.3 % — SIGNIFICANT CHANGE UP (ref 10.3–14.5)
SODIUM SERPL-SCNC: 138 MMOL/L — SIGNIFICANT CHANGE UP (ref 135–145)
WBC # BLD: 15.38 K/UL — HIGH (ref 3.8–10.5)
WBC # FLD AUTO: 15.38 K/UL — HIGH (ref 3.8–10.5)

## 2024-11-13 PROCEDURE — 99232 SBSQ HOSP IP/OBS MODERATE 35: CPT | Mod: GC

## 2024-11-13 PROCEDURE — 99232 SBSQ HOSP IP/OBS MODERATE 35: CPT

## 2024-11-13 RX ORDER — METHOCARBAMOL 500 MG/1
500 TABLET, FILM COATED ORAL EVERY 8 HOURS
Refills: 0 | Status: DISCONTINUED | OUTPATIENT
Start: 2024-11-13 | End: 2024-11-20

## 2024-11-13 RX ADMIN — Medication 2 TABLET(S): at 21:05

## 2024-11-13 RX ADMIN — METOPROLOL TARTRATE 50 MILLIGRAM(S): 100 TABLET, FILM COATED ORAL at 05:28

## 2024-11-13 RX ADMIN — LEVETIRACETAM 1000 MILLIGRAM(S): 1000 TABLET ORAL at 05:28

## 2024-11-13 RX ADMIN — CLONIDINE HYDROCHLORIDE 0.1 MILLIGRAM(S): 0.3 TABLET ORAL at 05:28

## 2024-11-13 RX ADMIN — Medication 81 MILLIGRAM(S): at 12:03

## 2024-11-13 RX ADMIN — ENOXAPARIN SODIUM 40 MILLIGRAM(S): 30 INJECTION SUBCUTANEOUS at 21:05

## 2024-11-13 RX ADMIN — LEVETIRACETAM 1000 MILLIGRAM(S): 1000 TABLET ORAL at 17:44

## 2024-11-13 RX ADMIN — FUROSEMIDE 40 MILLIGRAM(S): 40 TABLET ORAL at 05:28

## 2024-11-13 RX ADMIN — METHOCARBAMOL 500 MILLIGRAM(S): 500 TABLET, FILM COATED ORAL at 21:08

## 2024-11-13 RX ADMIN — POTASSIUM CHLORIDE 20 MILLIEQUIVALENT(S): 600 TABLET, EXTENDED RELEASE ORAL at 12:03

## 2024-11-13 RX ADMIN — METHOCARBAMOL 750 MILLIGRAM(S): 500 TABLET, FILM COATED ORAL at 05:29

## 2024-11-13 RX ADMIN — METOPROLOL TARTRATE 50 MILLIGRAM(S): 100 TABLET, FILM COATED ORAL at 17:44

## 2024-11-13 RX ADMIN — PANTOPRAZOLE SODIUM 40 MILLIGRAM(S): 40 TABLET, DELAYED RELEASE ORAL at 05:28

## 2024-11-13 RX ADMIN — CLONIDINE HYDROCHLORIDE 0.1 MILLIGRAM(S): 0.3 TABLET ORAL at 21:03

## 2024-11-13 RX ADMIN — CLONIDINE HYDROCHLORIDE 0.1 MILLIGRAM(S): 0.3 TABLET ORAL at 14:54

## 2024-11-13 NOTE — PROGRESS NOTE ADULT - NS ATTEND AMEND GEN_ALL_CORE FT
Patient is doing well. participating in therapy and making gains-   Discussed with medical consultants  Discussed in IDR rounds, team conference  depressed affect- will consider lexapro  cognitive impairment - increase SLP   MRI 6 weeks after surgery

## 2024-11-13 NOTE — PROGRESS NOTE ADULT - SUBJECTIVE AND OBJECTIVE BOX
HPI:  Mr. Brennan Schneider is a 39-year-old male patient with no previous past medical history who originally presented to [facility] on 10/29/24 with c/o chest tightness and pain.  Pt had CTA chest significant for Aortic dissection which was noted from the level of the root of the aorta extending into the ascending, aortic arch, descending thoracic aorta as well as extending into the abdominal aorta and was noted to end in the distal left common iliac artery. The dissection flap was in close proximity to the right coronary artery. The left renal artery was arising from the false lumen. He was subsequently transferred to [facility] direct to OR for bio Bentall procedure with reconstruction of the RCA with Dr. Grimm on 10/29/24. Post op course c/b HTN and seizure (treated with IV ativan, propofol, Keppra loaded). CT head/neck no acute findings. Neurology consulted for post op twitching of extremities and left sided facial/arm weakness, dysarthria and left visual neglect. S/p cEEG without acute events, suspicious for right hemisphere structural lesion. Repeat CT Head r/o suspected right frontoparietal stroke, no acute findings (will need f/u MRI 6 weeks postop). Residual left-sided weakness/neglect improving. Patient is optimized and was evaluated by PM&R and therapy for functional deficits, gait/ADL impairments and acute rehabilitation was recommended. Patient was cleared for discharge to [facility] IRF on 11/11/24.     NAD overnight, afebrile, some dry cough, using IS. Incision healing well  VSS  No Sz reported, denies SOB, NV, or HA. Alert and awake.   US abd for elevated LFTs -no acute findings. Hold Tylenol.   To therapy.      MEDICATIONS  (STANDING):  aspirin  chewable 81 milliGRAM(s) Oral daily  cloNIDine 0.1 milliGRAM(s) Oral three times a day  enoxaparin Injectable 40 milliGRAM(s) SubCutaneous every 24 hours  furosemide    Tablet 40 milliGRAM(s) Oral daily  influenza   Vaccine 0.5 milliLiter(s) IntraMuscular once  levETIRAcetam 1000 milliGRAM(s) Oral two times a day  metoprolol tartrate 50 milliGRAM(s) Oral two times a day  pantoprazole    Tablet 40 milliGRAM(s) Oral before breakfast  polyethylene glycol 3350 17 Gram(s) Oral daily  potassium chloride    Tablet ER 20 milliEquivalent(s) Oral daily  senna 2 Tablet(s) Oral at bedtime    MEDICATIONS  (PRN):  melatonin 6 milliGRAM(s) Oral at bedtime PRN Insomnia  methocarbamol 500 milliGRAM(s) Oral every 8 hours PRN Muscle Spasm                            10.1   15.38 )-----------( 529      ( 13 Nov 2024 06:06 )             30.7     11-13    138  |  101  |  15  ----------------------------<  111[H]  4.3   |  29  |  1.19    Ca    8.9      13 Nov 2024 06:06    TPro  7.0  /  Alb  2.5[L]  /  TBili  0.6  /  DBili  x   /  AST  70[H]  /  ALT  251[H]  /  AlkPhos  237[H]  11-13    Urinalysis Basic - ( 13 Nov 2024 06:06 )    Color: x / Appearance: x / SG: x / pH: x  Gluc: 111 mg/dL / Ketone: x  / Bili: x / Urobili: x   Blood: x / Protein: x / Nitrite: x   Leuk Esterase: x / RBC: x / WBC x   Sq Epi: x / Non Sq Epi: x / Bacteria: x      Vital Signs Last 24 Hrs  T(C): 36.8 (13 Nov 2024 08:06), Max: 37.2 (12 Nov 2024 19:18)  T(F): 98.3 (13 Nov 2024 08:06), Max: 99 (12 Nov 2024 19:18)  HR: 71 (13 Nov 2024 08:06) (66 - 73)  BP: 122/72 (13 Nov 2024 08:06) (116/63 - 136/70)  BP(mean): --  RR: 16 (13 Nov 2024 08:06) (16 - 16)  SpO2: 95% (13 Nov 2024 08:06) (95% - 95%)      Gen - NAD, Comfortable  HEENT - NCAT, EOMI, MMM, PERRLA, Normal Conjunctivae  Neck - Supple, No limited ROM  Pulm - CTAB, No wheeze, No rhonchi, No crackles  Cardiovascular - RRR, S1S2, No murmurs  Chest - good chest expansion, good respiratory effort  Abdomen - Soft, NT, +BS, Abd rounded   Extremities - No C/C, no calf tenderness, LLE edema >RLE  Neuro-     Cognitive - awake, alert, oriented to person, place, date, year, and situation.  Able  to follow command     Communication - Fluent, Comprehensible, No dysarthria, No aphasia      Attention: Intact, able to state days of week chronologically and backwards. Able to perform simple additions and subtractions     Memory: Recall 3 objects immediate and 3 min later,      Cranial Nerves -No facial asymmetry, Tongue midline, EOMI, Shoulder shrug intact     Motor - impulsive                     LEFT    UE - ShAB 5/5, EF 5/5, EE 5/5,  5/5                    RIGHT UE - ShAB 5/5, EF 5/5, EE 5/5,   5/5                    LEFT    LE - HF 5/5, KE 5/5, DF 5/5, PF 5/5                    RIGHT LE - HF 5/5, KE 5/5, DF 5/5, PF 5/5        Sensory - Intact  to LT      Coordination - FTN/HTS intact     Tone - Normal  Psychiatric - Mood stable, Affect WNL  Skin:  R anterior cervical neck wound 1x1cm, 25cm mid sternal incision , well approximated, with steri strips, 2 lap sitex under 1.5 cm each, R anterior shoulder incision 7cm , moisture associated dermatitis groin      Continue comprehensive acute rehab program consisting of 3hrs/day of OT/PT and SLP.

## 2024-11-13 NOTE — PROGRESS NOTE ADULT - SUBJECTIVE AND OBJECTIVE BOX
PROGRESS NOTE:     Patient is a 39y old  Male who presents with a chief complaint of Aortic Dissection (12 Nov 2024 10:07)          SUBJECTIVE & OBJECTIVE:   Pt seen and examined at bedside in AM    no overnight events.       REVIEW OF SYSTEMS: remaining ROS negative     PHYSICAL EXAM:  VITALS:  Vital Signs Last 24 Hrs  T(C): 36.8 (13 Nov 2024 08:06), Max: 37.2 (12 Nov 2024 19:18)  T(F): 98.3 (13 Nov 2024 08:06), Max: 99 (12 Nov 2024 19:18)  HR: 71 (13 Nov 2024 08:06) (66 - 73)  BP: 122/72 (13 Nov 2024 08:06) (116/63 - 136/70)  BP(mean): --  RR: 16 (13 Nov 2024 08:06) (16 - 17)  SpO2: 95% (13 Nov 2024 08:06) (93% - 95%)    Parameters below as of 13 Nov 2024 08:06  Patient On (Oxygen Delivery Method): room air          GENERAL: NAD,  no increased WOB  HEAD:  Atraumatic, Normocephalic  EYES: EOMI,  conjunctiva and sclera clear  ENMT: Moist mucous membranes  NECK: Supple, No JVD  NERVOUS SYSTEM:  Alert  CHEST/LUNG: Clear to auscultation bilaterally; No rales, rhonchi, wheezing  HEART: Regular rate and rhythm  ABDOMEN: Soft, Nontender, Nondistended; Bowel sounds present  EXTREMITIES: No clubbing, cyanosis, calf tenderness or edema b/l      MEDICATIONS  (STANDING):  aspirin  chewable 81 milliGRAM(s) Oral daily  cloNIDine 0.1 milliGRAM(s) Oral three times a day  enoxaparin Injectable 40 milliGRAM(s) SubCutaneous every 24 hours  furosemide    Tablet 40 milliGRAM(s) Oral daily  influenza   Vaccine 0.5 milliLiter(s) IntraMuscular once  levETIRAcetam 1000 milliGRAM(s) Oral two times a day  metoprolol tartrate 50 milliGRAM(s) Oral two times a day  pantoprazole    Tablet 40 milliGRAM(s) Oral before breakfast  polyethylene glycol 3350 17 Gram(s) Oral daily  potassium chloride    Tablet ER 20 milliEquivalent(s) Oral daily  senna 2 Tablet(s) Oral at bedtime    MEDICATIONS  (PRN):  melatonin 6 milliGRAM(s) Oral at bedtime PRN Insomnia  methocarbamol 500 milliGRAM(s) Oral every 8 hours PRN Muscle Spasm      Allergies    Allergy Status Unknown  No Known Allergies    Intolerances              LABS:                           10.1   15.38 )-----------( 529      ( 13 Nov 2024 06:06 )             30.7     11-13    138  |  101  |  15  ----------------------------<  111[H]  4.3   |  29  |  1.19    Ca    8.9      13 Nov 2024 06:06    TPro  7.0  /  Alb  2.5[L]  /  TBili  0.6  /  DBili  x   /  AST  70[H]  /  ALT  251[H]  /  AlkPhos  237[H]  11-13      Urinalysis Basic - ( 13 Nov 2024 06:06 )    Color: x / Appearance: x / SG: x / pH: x  Gluc: 111 mg/dL / Ketone: x  / Bili: x / Urobili: x   Blood: x / Protein: x / Nitrite: x   Leuk Esterase: x / RBC: x / WBC x   Sq Epi: x / Non Sq Epi: x / Bacteria: x      CAPILLARY BLOOD GLUCOSE                    RECENT CULTURES:          RADIOLOGY & ADDITIONAL TESTS:    < from: US Abdomen Upper Quadrant Right (11.12.24 @ 11:58) >  IMPRESSION:  Minimal/equivocally increased hepatic echotexture without focal   abnormality, raises the possibility of steatosis    < end of copied text >

## 2024-11-14 LAB
ALBUMIN SERPL ELPH-MCNC: 2.6 G/DL — LOW (ref 3.3–5)
ALP SERPL-CCNC: 258 U/L — HIGH (ref 40–120)
ALT FLD-CCNC: 284 U/L — HIGH (ref 10–45)
ANION GAP SERPL CALC-SCNC: 11 MMOL/L — SIGNIFICANT CHANGE UP (ref 5–17)
AST SERPL-CCNC: 80 U/L — HIGH (ref 10–40)
BASOPHILS # BLD AUTO: 0.12 K/UL — SIGNIFICANT CHANGE UP (ref 0–0.2)
BASOPHILS NFR BLD AUTO: 0.8 % — SIGNIFICANT CHANGE UP (ref 0–2)
BILIRUB SERPL-MCNC: 0.6 MG/DL — SIGNIFICANT CHANGE UP (ref 0.2–1.2)
BUN SERPL-MCNC: 14 MG/DL — SIGNIFICANT CHANGE UP (ref 7–23)
CALCIUM SERPL-MCNC: 9.2 MG/DL — SIGNIFICANT CHANGE UP (ref 8.4–10.5)
CHLORIDE SERPL-SCNC: 101 MMOL/L — SIGNIFICANT CHANGE UP (ref 96–108)
CO2 SERPL-SCNC: 27 MMOL/L — SIGNIFICANT CHANGE UP (ref 22–31)
CREAT SERPL-MCNC: 1.15 MG/DL — SIGNIFICANT CHANGE UP (ref 0.5–1.3)
EGFR: 84 ML/MIN/1.73M2 — SIGNIFICANT CHANGE UP
EOSINOPHIL # BLD AUTO: 0.38 K/UL — SIGNIFICANT CHANGE UP (ref 0–0.5)
EOSINOPHIL NFR BLD AUTO: 2.5 % — SIGNIFICANT CHANGE UP (ref 0–6)
GLUCOSE SERPL-MCNC: 116 MG/DL — HIGH (ref 70–99)
HCT VFR BLD CALC: 31.2 % — LOW (ref 39–50)
HGB BLD-MCNC: 10.2 G/DL — LOW (ref 13–17)
IMM GRANULOCYTES NFR BLD AUTO: 4.2 % — HIGH (ref 0–0.9)
LYMPHOCYTES # BLD AUTO: 1.97 K/UL — SIGNIFICANT CHANGE UP (ref 1–3.3)
LYMPHOCYTES # BLD AUTO: 13.1 % — SIGNIFICANT CHANGE UP (ref 13–44)
MCHC RBC-ENTMCNC: 28.5 PG — SIGNIFICANT CHANGE UP (ref 27–34)
MCHC RBC-ENTMCNC: 32.7 G/DL — SIGNIFICANT CHANGE UP (ref 32–36)
MCV RBC AUTO: 87.2 FL — SIGNIFICANT CHANGE UP (ref 80–100)
MONOCYTES # BLD AUTO: 1.66 K/UL — HIGH (ref 0–0.9)
MONOCYTES NFR BLD AUTO: 11.1 % — SIGNIFICANT CHANGE UP (ref 2–14)
NEUTROPHILS # BLD AUTO: 10.25 K/UL — HIGH (ref 1.8–7.4)
NEUTROPHILS NFR BLD AUTO: 68.3 % — SIGNIFICANT CHANGE UP (ref 43–77)
NRBC # BLD: 0 /100 WBCS — SIGNIFICANT CHANGE UP (ref 0–0)
PLATELET # BLD AUTO: 569 K/UL — HIGH (ref 150–400)
POTASSIUM SERPL-MCNC: 4.2 MMOL/L — SIGNIFICANT CHANGE UP (ref 3.5–5.3)
POTASSIUM SERPL-SCNC: 4.2 MMOL/L — SIGNIFICANT CHANGE UP (ref 3.5–5.3)
PROT SERPL-MCNC: 7.5 G/DL — SIGNIFICANT CHANGE UP (ref 6–8.3)
RBC # BLD: 3.58 M/UL — LOW (ref 4.2–5.8)
RBC # FLD: 14.3 % — SIGNIFICANT CHANGE UP (ref 10.3–14.5)
SODIUM SERPL-SCNC: 139 MMOL/L — SIGNIFICANT CHANGE UP (ref 135–145)
WBC # BLD: 15.01 K/UL — HIGH (ref 3.8–10.5)
WBC # FLD AUTO: 15.01 K/UL — HIGH (ref 3.8–10.5)

## 2024-11-14 PROCEDURE — 99232 SBSQ HOSP IP/OBS MODERATE 35: CPT

## 2024-11-14 PROCEDURE — 99232 SBSQ HOSP IP/OBS MODERATE 35: CPT | Mod: GC

## 2024-11-14 RX ORDER — GUAIFENESIN 400 MG
200 TABLET ORAL EVERY 6 HOURS
Refills: 0 | Status: COMPLETED | OUTPATIENT
Start: 2024-11-14 | End: 2024-11-14

## 2024-11-14 RX ADMIN — CLONIDINE HYDROCHLORIDE 0.1 MILLIGRAM(S): 0.3 TABLET ORAL at 13:05

## 2024-11-14 RX ADMIN — LEVETIRACETAM 1000 MILLIGRAM(S): 1000 TABLET ORAL at 05:44

## 2024-11-14 RX ADMIN — FUROSEMIDE 40 MILLIGRAM(S): 40 TABLET ORAL at 05:44

## 2024-11-14 RX ADMIN — CLONIDINE HYDROCHLORIDE 0.1 MILLIGRAM(S): 0.3 TABLET ORAL at 21:16

## 2024-11-14 RX ADMIN — METOPROLOL TARTRATE 50 MILLIGRAM(S): 100 TABLET, FILM COATED ORAL at 05:44

## 2024-11-14 RX ADMIN — POTASSIUM CHLORIDE 20 MILLIEQUIVALENT(S): 600 TABLET, EXTENDED RELEASE ORAL at 13:06

## 2024-11-14 RX ADMIN — METOPROLOL TARTRATE 50 MILLIGRAM(S): 100 TABLET, FILM COATED ORAL at 18:04

## 2024-11-14 RX ADMIN — PANTOPRAZOLE SODIUM 40 MILLIGRAM(S): 40 TABLET, DELAYED RELEASE ORAL at 05:44

## 2024-11-14 RX ADMIN — Medication 200 MILLIGRAM(S): at 22:47

## 2024-11-14 RX ADMIN — ENOXAPARIN SODIUM 40 MILLIGRAM(S): 30 INJECTION SUBCUTANEOUS at 21:18

## 2024-11-14 RX ADMIN — Medication 81 MILLIGRAM(S): at 13:05

## 2024-11-14 RX ADMIN — CLONIDINE HYDROCHLORIDE 0.1 MILLIGRAM(S): 0.3 TABLET ORAL at 05:44

## 2024-11-14 RX ADMIN — LEVETIRACETAM 1000 MILLIGRAM(S): 1000 TABLET ORAL at 18:03

## 2024-11-14 NOTE — PROGRESS NOTE ADULT - SUBJECTIVE AND OBJECTIVE BOX
HPI:  Mr. Brennan Schneider is a 39-year-old male patient with no previous past medical history who originally presented to [facility] on 10/29/24 with c/o chest tightness and pain.  Pt had CTA chest significant for Aortic dissection which was noted from the level of the root of the aorta extending into the ascending, aortic arch, descending thoracic aorta as well as extending into the abdominal aorta and was noted to end in the distal left common iliac artery. The dissection flap was in close proximity to the right coronary artery. The left renal artery was arising from the false lumen. He was subsequently transferred to [facility] direct to OR for bio Bentall procedure with reconstruction of the RCA with Dr. Grimm on 10/29/24. Post op course c/b HTN and seizure (treated with IV ativan, propofol, Keppra loaded). CT head/neck no acute findings. Neurology consulted for post op twitching of extremities and left sided facial/arm weakness, dysarthria and left visual neglect. S/p cEEG without acute events, suspicious for right hemisphere structural lesion. Repeat CT Head r/o suspected right frontoparietal stroke, no acute findings (will need f/u MRI 6 weeks postop). Residual left-sided weakness/neglect improving. Patient is optimized and was evaluated by PM&R and therapy for functional deficits, gait/ADL impairments and acute rehabilitation was recommended. Patient was cleared for discharge to [facility] IRF on 11/11/24. (11 Nov 2024 13:13)      NAD overnight, afebrile, some dry cough, using IS. Incisions healing well-GAURAV.   VSS  No Sz reported, denies SOB, NV, or HA. Alert and awake.   US abd for elevated LFTs -no acute findings. Hold Tylenol.   A&Ox4, but aware of slow processing.   To therapy.      MEDICATIONS  (STANDING):  aspirin  chewable 81 milliGRAM(s) Oral daily  cloNIDine 0.1 milliGRAM(s) Oral three times a day  enoxaparin Injectable 40 milliGRAM(s) SubCutaneous every 24 hours  furosemide    Tablet 40 milliGRAM(s) Oral daily  influenza   Vaccine 0.5 milliLiter(s) IntraMuscular once  levETIRAcetam 1000 milliGRAM(s) Oral two times a day  metoprolol tartrate 50 milliGRAM(s) Oral two times a day  pantoprazole    Tablet 40 milliGRAM(s) Oral before breakfast  polyethylene glycol 3350 17 Gram(s) Oral daily  potassium chloride    Tablet ER 20 milliEquivalent(s) Oral daily  senna 2 Tablet(s) Oral at bedtime    MEDICATIONS  (PRN):  melatonin 6 milliGRAM(s) Oral at bedtime PRN Insomnia  methocarbamol 500 milliGRAM(s) Oral every 8 hours PRN Muscle Spasm                            10.2   15.01 )-----------( 569      ( 14 Nov 2024 06:54 )             31.2     11-13    138  |  101  |  15  ----------------------------<  111[H]  4.3   |  29  |  1.19    Ca    8.9      13 Nov 2024 06:06    TPro  7.0  /  Alb  2.5[L]  /  TBili  0.6  /  DBili  x   /  AST  70[H]  /  ALT  251[H]  /  AlkPhos  237[H]  11-13    Urinalysis Basic - ( 13 Nov 2024 06:06 )    Color: x / Appearance: x / SG: x / pH: x  Gluc: 111 mg/dL / Ketone: x  / Bili: x / Urobili: x   Blood: x / Protein: x / Nitrite: x   Leuk Esterase: x / RBC: x / WBC x   Sq Epi: x / Non Sq Epi: x / Bacteria: x      Vital Signs Last 24 Hrs  T(C): 36.8 (14 Nov 2024 08:50), Max: 37.3 (13 Nov 2024 21:00)  T(F): 98.2 (14 Nov 2024 08:50), Max: 99.1 (13 Nov 2024 21:00)  HR: 71 (14 Nov 2024 08:50) (71 - 83)  BP: 112/70 (14 Nov 2024 08:50) (109/67 - 154/71)  BP(mean): --  RR: 16 (14 Nov 2024 08:50) (16 - 16)  SpO2: 94% (14 Nov 2024 08:50) (94% - 96%)    Parameters below as of 14 Nov 2024 08:50  Patient On (Oxygen Delivery Method): room air      Gen - NAD, Comfortable  HEENT - NCAT, EOMI, MMM, PERRLA, Normal Conjunctivae  Neck - Supple, No limited ROM  Pulm - CTAB, No wheeze, No rhonchi, No crackles  Cardiovascular - RRR, S1S2, No murmurs  Chest - good chest expansion, good respiratory effort  Abdomen - Soft, NT, +BS, Abd rounded   Extremities - No C/C, no calf tenderness  Neuro-     Cognitive - awake, alert, oriented to person, place, date, year, and situation.  Able  to follow commands     Communication - Fluent, Comprehensible, No dysarthria, No aphasia      Attention: Impaired w/delayed procressing     Cranial Nerves -No facial asymmetry, Tongue midline, EOMI, Shoulder shrug intact     Motor -                    LEFT    UE - ShAB 5/5, EF 5/5, EE 5/5,  5/5                    RIGHT UE - ShAB 5/5, EF 5/5, EE 5/5,   5/5                    LEFT    LE - HF 5/5, KE 5/5, DF 5/5, PF 5/5                    RIGHT LE - HF 5/5, KE 5/5, DF 5/5, PF 5/5        Sensory - Intact  to LT      Tone - Normal  Psychiatric - Mood stable, Affect flat  Skin:  R anterior cervical neck wound 1x1cm, 25cm mid sternal incision , well approximated, with steri strips, 2 lap sitex under 1.5 cm each, R anterior shoulder incision 7cm , moisture associated dermatitis groin        Continue comprehensive acute rehab program consisting of 3hrs/day of OT/PT and SLP.

## 2024-11-14 NOTE — PROGRESS NOTE ADULT - SUBJECTIVE AND OBJECTIVE BOX
PROGRESS NOTE:     Patient is a 39y old  Male who presents with a chief complaint of Aortic Dissection (12 Nov 2024 10:07)          SUBJECTIVE & OBJECTIVE:   Pt seen and examined at bedside in AM    no overnight events.       REVIEW OF SYSTEMS: remaining ROS negative     PHYSICAL EXAM:  VITALS:  Vital Signs Last 24 Hrs  T(C): 36.8 (14 Nov 2024 08:50), Max: 37.3 (13 Nov 2024 21:00)  T(F): 98.2 (14 Nov 2024 08:50), Max: 99.1 (13 Nov 2024 21:00)  HR: 71 (14 Nov 2024 08:50) (71 - 83)  BP: 112/70 (14 Nov 2024 08:50) (109/67 - 154/71)  BP(mean): --  RR: 16 (14 Nov 2024 08:50) (16 - 16)  SpO2: 94% (14 Nov 2024 08:50) (94% - 96%)    Parameters below as of 14 Nov 2024 08:50  Patient On (Oxygen Delivery Method): room air          GENERAL: NAD,  no increased WOB  HEAD:  Atraumatic, Normocephalic  EYES: EOMI,  conjunctiva and sclera clear  ENMT: Moist mucous membranes  NECK: Supple, No JVD  NERVOUS SYSTEM:  Alert  CHEST/LUNG: Clear to auscultation bilaterally; No rales, rhonchi, wheezing  HEART: Regular rate and rhythm  ABDOMEN: Soft, Nontender, Nondistended; Bowel sounds present  EXTREMITIES: No clubbing, cyanosis, calf tenderness or edema b/l      MEDICATIONS  (STANDING):  aspirin  chewable 81 milliGRAM(s) Oral daily  cloNIDine 0.1 milliGRAM(s) Oral three times a day  enoxaparin Injectable 40 milliGRAM(s) SubCutaneous every 24 hours  furosemide    Tablet 40 milliGRAM(s) Oral daily  influenza   Vaccine 0.5 milliLiter(s) IntraMuscular once  levETIRAcetam 1000 milliGRAM(s) Oral two times a day  metoprolol tartrate 50 milliGRAM(s) Oral two times a day  pantoprazole    Tablet 40 milliGRAM(s) Oral before breakfast  polyethylene glycol 3350 17 Gram(s) Oral daily  potassium chloride    Tablet ER 20 milliEquivalent(s) Oral daily  senna 2 Tablet(s) Oral at bedtime    MEDICATIONS  (PRN):  melatonin 6 milliGRAM(s) Oral at bedtime PRN Insomnia  methocarbamol 500 milliGRAM(s) Oral every 8 hours PRN Muscle Spasm        Allergies    Allergy Status Unknown  No Known Allergies    Intolerances              LABS:                           10.2   15.01 )-----------( 569      ( 14 Nov 2024 06:54 )             31.2     11-14    139  |  101  |  14  ----------------------------<  116[H]  4.2   |  27  |  1.15    Ca    9.2      14 Nov 2024 06:54    TPro  7.5  /  Alb  2.6[L]  /  TBili  0.6  /  DBili  x   /  AST  80[H]  /  ALT  284[H]  /  AlkPhos  258[H]  11-14    LIVER FUNCTIONS - ( 14 Nov 2024 06:54 )  Alb: 2.6 g/dL / Pro: 7.5 g/dL / ALK PHOS: 258 U/L / ALT: 284 U/L / AST: 80 U/L / GGT: x             Urinalysis Basic - ( 14 Nov 2024 06:54 )    Color: x / Appearance: x / SG: x / pH: x  Gluc: 116 mg/dL / Ketone: x  / Bili: x / Urobili: x   Blood: x / Protein: x / Nitrite: x   Leuk Esterase: x / RBC: x / WBC x   Sq Epi: x / Non Sq Epi: x / Bacteria: x      CAPILLARY BLOOD GLUCOSE                    RECENT CULTURES:          RADIOLOGY & ADDITIONAL TESTS:    < from: US Abdomen Upper Quadrant Right (11.12.24 @ 11:58) >  IMPRESSION:  Minimal/equivocally increased hepatic echotexture without focal   abnormality, raises the possibility of steatosis    < end of copied text >

## 2024-11-14 NOTE — PROGRESS NOTE ADULT - NS ATTEND AMEND GEN_ALL_CORE FT
g Patient is doing well. participating in therapy and making gains-   Discussed with medical consultants  Discussed in IDR rounds, team conference  Increasing LFT's - sono ok, minimize tylenol and robaxin   WBC stable   processing is better

## 2024-11-15 ENCOUNTER — TRANSCRIPTION ENCOUNTER (OUTPATIENT)
Age: 39
End: 2024-11-15

## 2024-11-15 LAB
HAV IGM SER-ACNC: SIGNIFICANT CHANGE UP
HBV CORE IGM SER-ACNC: SIGNIFICANT CHANGE UP
HBV SURFACE AG SER-ACNC: SIGNIFICANT CHANGE UP
HCV AB S/CO SERPL IA: 0.26 S/CO — SIGNIFICANT CHANGE UP (ref 0–0.99)
HCV AB SERPL-IMP: SIGNIFICANT CHANGE UP

## 2024-11-15 PROCEDURE — 99232 SBSQ HOSP IP/OBS MODERATE 35: CPT

## 2024-11-15 PROCEDURE — 99232 SBSQ HOSP IP/OBS MODERATE 35: CPT | Mod: GC

## 2024-11-15 RX ADMIN — LEVETIRACETAM 1000 MILLIGRAM(S): 1000 TABLET ORAL at 05:12

## 2024-11-15 RX ADMIN — FUROSEMIDE 40 MILLIGRAM(S): 40 TABLET ORAL at 05:13

## 2024-11-15 RX ADMIN — METOPROLOL TARTRATE 50 MILLIGRAM(S): 100 TABLET, FILM COATED ORAL at 17:20

## 2024-11-15 RX ADMIN — LEVETIRACETAM 1000 MILLIGRAM(S): 1000 TABLET ORAL at 17:20

## 2024-11-15 RX ADMIN — ENOXAPARIN SODIUM 40 MILLIGRAM(S): 30 INJECTION SUBCUTANEOUS at 21:56

## 2024-11-15 RX ADMIN — CLONIDINE HYDROCHLORIDE 0.1 MILLIGRAM(S): 0.3 TABLET ORAL at 05:12

## 2024-11-15 RX ADMIN — CLONIDINE HYDROCHLORIDE 0.1 MILLIGRAM(S): 0.3 TABLET ORAL at 21:55

## 2024-11-15 RX ADMIN — POTASSIUM CHLORIDE 20 MILLIEQUIVALENT(S): 600 TABLET, EXTENDED RELEASE ORAL at 12:28

## 2024-11-15 RX ADMIN — METOPROLOL TARTRATE 50 MILLIGRAM(S): 100 TABLET, FILM COATED ORAL at 05:12

## 2024-11-15 RX ADMIN — PANTOPRAZOLE SODIUM 40 MILLIGRAM(S): 40 TABLET, DELAYED RELEASE ORAL at 05:12

## 2024-11-15 RX ADMIN — CLONIDINE HYDROCHLORIDE 0.1 MILLIGRAM(S): 0.3 TABLET ORAL at 15:31

## 2024-11-15 RX ADMIN — Medication 81 MILLIGRAM(S): at 12:28

## 2024-11-15 NOTE — DISCHARGE NOTE PROVIDER - NSDCFUADDAPPT_GEN_ALL_CORE_FT
IRMA set up new pt PCP/post-hospitial visit:  Dr. Reva Sandoval  [address] [place], [place] [address]. [place], [place]  [phone]  Thursday November 21, 2024  10:00 AM  Please bring discharge summary, ID and insurance card

## 2024-11-15 NOTE — DISCHARGE NOTE NURSING/CASE MANAGEMENT/SOCIAL WORK - NSDCFUADDAPPT_GEN_ALL_CORE_FT
IRMA set up new pt PCP/post-hospitial visit:  Dr. Reva Sandoval  [address] [place], [place] [address]. [place], [place]  [phone]  Thursday November 21, 2024  10:00 AM  Please bring discharge summary, ID and insurance card IRMA set up new pt PCP/post-hospitial visit:  Dr. Reva Sandoval  [address] [place] [address]. [place], [place]  [phone]  Thursday November 21, 2024  10:00 AM  Please bring discharge summary, ID and insurance card    IRMA spoke with Celestina at [facility] in [place] to coordinate outpatient evaluations:  [facility]  [address] [facility]  [phone] Phone  [phone] Fax    Physical Therapy Evaluation Friday 11/22 at 9AM  Speech Therapy Evaluation Tuesday 11/26 at 9AM  Occupational Therapy Evaluation Wednesday 11/27 at 11AM

## 2024-11-15 NOTE — PROGRESS NOTE ADULT - SUBJECTIVE AND OBJECTIVE BOX
PROGRESS NOTE:     Patient is a 39y old  Male who presents with a chief complaint of Aortic Dissection (12 Nov 2024 10:07)          SUBJECTIVE & OBJECTIVE:   Pt seen and examined at bedside in AM    no overnight events.       REVIEW OF SYSTEMS: remaining ROS negative     PHYSICAL EXAM:  VITALS:  Vital Signs Last 24 Hrs  T(C): 37.1 (15 Nov 2024 07:24), Max: 37.3 (14 Nov 2024 19:22)  T(F): 98.8 (15 Nov 2024 07:24), Max: 99.2 (14 Nov 2024 19:22)  HR: 64 (15 Nov 2024 07:24) (64 - 82)  BP: 120/62 (15 Nov 2024 07:24) (114/74 - 146/75)  BP(mean): --  RR: 16 (15 Nov 2024 07:24) (16 - 16)  SpO2: 96% (15 Nov 2024 07:24) (94% - 96%)    Parameters below as of 15 Nov 2024 07:24  Patient On (Oxygen Delivery Method): room air        GENERAL: NAD,  no increased WOB  HEAD:  Atraumatic, Normocephalic  EYES: EOMI,  conjunctiva and sclera clear  ENMT: Moist mucous membranes  NECK: Supple, No JVD  NERVOUS SYSTEM:  Alert  CHEST/LUNG: Clear to auscultation bilaterally; No rales, rhonchi, wheezing  HEART: Regular rate and rhythm  ABDOMEN: Soft, Nontender, Nondistended; Bowel sounds present  EXTREMITIES: No clubbing, cyanosis, calf tenderness or edema b/l      MEDICATIONS  (STANDING):  aspirin  chewable 81 milliGRAM(s) Oral daily  cloNIDine 0.1 milliGRAM(s) Oral three times a day  enoxaparin Injectable 40 milliGRAM(s) SubCutaneous every 24 hours  furosemide    Tablet 40 milliGRAM(s) Oral daily  influenza   Vaccine 0.5 milliLiter(s) IntraMuscular once  levETIRAcetam 1000 milliGRAM(s) Oral two times a day  metoprolol tartrate 50 milliGRAM(s) Oral two times a day  pantoprazole    Tablet 40 milliGRAM(s) Oral before breakfast  polyethylene glycol 3350 17 Gram(s) Oral daily  potassium chloride    Tablet ER 20 milliEquivalent(s) Oral daily  senna 2 Tablet(s) Oral at bedtime    MEDICATIONS  (PRN):  melatonin 6 milliGRAM(s) Oral at bedtime PRN Insomnia  methocarbamol 500 milliGRAM(s) Oral every 8 hours PRN Muscle Spasm        Allergies    Allergy Status Unknown  No Known Allergies    Intolerances              LABS:                           10.2   15.01 )-----------( 569      ( 14 Nov 2024 06:54 )             31.2     11-14    139  |  101  |  14  ----------------------------<  116[H]  4.2   |  27  |  1.15    Ca    9.2      14 Nov 2024 06:54    TPro  7.5  /  Alb  2.6[L]  /  TBili  0.6  /  DBili  x   /  AST  80[H]  /  ALT  284[H]  /  AlkPhos  258[H]  11-14    LIVER FUNCTIONS - ( 14 Nov 2024 06:54 )  Alb: 2.6 g/dL / Pro: 7.5 g/dL / ALK PHOS: 258 U/L / ALT: 284 U/L / AST: 80 U/L / GGT: x             Urinalysis Basic - ( 14 Nov 2024 06:54 )    Color: x / Appearance: x / SG: x / pH: x  Gluc: 116 mg/dL / Ketone: x  / Bili: x / Urobili: x   Blood: x / Protein: x / Nitrite: x   Leuk Esterase: x / RBC: x / WBC x   Sq Epi: x / Non Sq Epi: x / Bacteria: x      CAPILLARY BLOOD GLUCOSE                    RECENT CULTURES:          RADIOLOGY & ADDITIONAL TESTS:    < from: US Abdomen Upper Quadrant Right (11.12.24 @ 11:58) >  IMPRESSION:  Minimal/equivocally increased hepatic echotexture without focal   abnormality, raises the possibility of steatosis    < end of copied text >

## 2024-11-15 NOTE — DISCHARGE NOTE PROVIDER - CARE PROVIDERS DIRECT ADDRESSES
,[email or web address]

## 2024-11-15 NOTE — DISCHARGE NOTE PROVIDER - CARE PROVIDER_API CALL
Lazaro Grimm  Thoracic and Cardiac Surgery  [address] [place] [address]  Phone: [phone]  Fax: [phone]  Follow Up Time:    Lazaro Grimm  Thoracic and Cardiac Surgery  [address] [place] [address]  Phone: [phone]  Fax: [phone]  Follow Up Time:     Keven Leach  Neurology  [address] [facility], [address]  [place] [address]  Phone: [phone]  Fax: [phone]  Follow Up Time:     Reva Sandoval  Internal Medicine  [address] [place] [address]  Phone: [phone]  Fax: [phone]  Follow Up Time:

## 2024-11-15 NOTE — DISCHARGE NOTE PROVIDER - PROVIDER TOKENS
PROVIDER:[TOKEN:[[id number]]] PROVIDER:[TOKEN:[[id number]]],PROVIDER:[TOKEN:[[id number]]],PROVIDER:[TOKEN:[[id number]]]

## 2024-11-15 NOTE — DISCHARGE NOTE NURSING/CASE MANAGEMENT/SOCIAL WORK - PATIENT PORTAL LINK FT
You can access the FollowMyHealth Patient Portal offered by [facility] by registering at the following website: [email or web address]. By joining [employer]’s FollowMyHealth portal, you will also be able to view your health information using other applications (apps) compatible with our system.

## 2024-11-15 NOTE — DISCHARGE NOTE PROVIDER - HOSPITAL COURSE
Mr. Brennan Schneider is a 39-year-old male patient with no previous past medical history who originally presented to [facility] on 10/29/24 with c/o chest tightness and pain.  Pt had CTA chest significant for Aortic dissection which was noted from the level of the root of the aorta extending into the ascending, aortic arch, descending thoracic aorta as well as extending into the abdominal aorta and was noted to end in the distal left common iliac artery. The dissection flap was in close proximity to the right coronary artery. The left renal artery was arising from the false lumen. He was subsequently transferred to [facility] direct to OR for bio Bentall procedure with reconstruction of the RCA with Dr. Grimm on 10/29/24. Post op course c/b HTN and seizure (treated with IV ativan, propofol, Keppra loaded). CT head/neck no acute findings. Neurology consulted for post op twitching of extremities and left sided facial/arm weakness, dysarthria and left visual neglect. S/p cEEG without acute events, suspicious for right hemisphere structural lesion. Repeat CT Head r/o suspected right frontoparietal stroke, no acute findings (will need f/u MRI 6 weeks postop). Residual left-sided weakness/neglect improving. Patient is optimized and was evaluated by PM&R and therapy for functional deficits, gait/ADL impairments and acute rehabilitation was recommended. Patient was cleared for discharge to [facility] IRF on 11/11/24. (11 Nov 2024 13:13)   Mr. Brennan Schneider is a 39-year-old male patient with no PMH presented to [facility] on 10/29/24 with c/o chest tightness and pain. CTA chest significant for Aortic dissection from the level of the root of the aorta extending into the ascending, aortic arch, descending thoracic aorta as well as extending into the abdominal aorta and end in the distal left common iliac artery. The dissection flap was in close proximity to the right coronary artery. Pt transferred to [facility] direct to OR for bio Bentall procedure with reconstruction of the RCA with Dr. Grimm on 10/29/24. Post op course c/b HTN and seizure (treated with IV ativan, propofol, Keppra loaded). CT head/neck no acute findings. Neurology consulted for post op twitching of extremities and left sided facial/arm weakness, dysarthria and left visual neglect. S/p cEEG without Sz. Repeat CT Head r/o suspected right frontoparietal stroke, no acute findings (plan f/u MRI 6 weeks postop due to sternal wiring per CTS). Residual left-sided weakness/neglect improving. Patient evaluated by PM&R and therapy for functional deficits, gait/ADL impairments and acute rehabilitation was recommended. Patient was cleared for discharge to [facility] on 11/11/24.   At [facility] rehab patient completed comprehensive PT OT SLP program and reached his rehab goals on 11/20. While at rehab evaluated by hospitalist. Medications continued per plan. Tolerating therapy, VSS. Wounds healing well. LFTs elevated-US abd neg acute findings. Tylenol dc. Outpt follow up. Leukocytosis improved, OOB, IS, afebrile. Cleared for dc on 11/20.

## 2024-11-15 NOTE — PROGRESS NOTE ADULT - SUBJECTIVE AND OBJECTIVE BOX
HPI:  Mr. Brennan Schneider is a 39-year-old male patient with no previous past medical history who originally presented to [facility] on 10/29/24 with c/o chest tightness and pain.  Pt had CTA chest significant for Aortic dissection which was noted from the level of the root of the aorta extending into the ascending, aortic arch, descending thoracic aorta as well as extending into the abdominal aorta and was noted to end in the distal left common iliac artery. The dissection flap was in close proximity to the right coronary artery. The left renal artery was arising from the false lumen. He was subsequently transferred to [facility] direct to OR for bio Bentall procedure with reconstruction of the RCA with Dr. Grimm on 10/29/24. Post op course c/b HTN and seizure (treated with IV ativan, propofol, Keppra loaded). CT head/neck no acute findings. Neurology consulted for post op twitching of extremities and left sided facial/arm weakness, dysarthria and left visual neglect. S/p cEEG without acute events, suspicious for right hemisphere structural lesion. Repeat CT Head r/o suspected right frontoparietal stroke, no acute findings (will need f/u MRI 6 weeks postop). Residual left-sided weakness/neglect improving. Patient is optimized and was evaluated by PM&R and therapy for functional deficits, gait/ADL impairments and acute rehabilitation was recommended. Patient was cleared for discharge to [facility] IRF on 11/11/24. (11 Nov 2024 13:13)      NAD overnight, afebrile, some dry cough, using IS. Incisions healing well-GAURAV.   VSS  No Sz reported, denies SOB, NV, or HA. Alert and awake.   US abd for elevated LFTs -no acute findings. Hold Tylenol. Labs following, asymptomatic. No NV.   A&Ox4, but aware of slow processing.   To therapy. Update on progress to family 11/14.         MEDICATIONS  (STANDING):  aspirin  chewable 81 milliGRAM(s) Oral daily  cloNIDine 0.1 milliGRAM(s) Oral three times a day  enoxaparin Injectable 40 milliGRAM(s) SubCutaneous every 24 hours  furosemide    Tablet 40 milliGRAM(s) Oral daily  influenza   Vaccine 0.5 milliLiter(s) IntraMuscular once  levETIRAcetam 1000 milliGRAM(s) Oral two times a day  metoprolol tartrate 50 milliGRAM(s) Oral two times a day  pantoprazole    Tablet 40 milliGRAM(s) Oral before breakfast  polyethylene glycol 3350 17 Gram(s) Oral daily  potassium chloride    Tablet ER 20 milliEquivalent(s) Oral daily  senna 2 Tablet(s) Oral at bedtime    MEDICATIONS  (PRN):  melatonin 6 milliGRAM(s) Oral at bedtime PRN Insomnia  methocarbamol 500 milliGRAM(s) Oral every 8 hours PRN Muscle Spasm                            10.2   15.01 )-----------( 569      ( 14 Nov 2024 06:54 )             31.2     11-14    139  |  101  |  14  ----------------------------<  116[H]  4.2   |  27  |  1.15    Ca    9.2      14 Nov 2024 06:54    TPro  7.5  /  Alb  2.6[L]  /  TBili  0.6  /  DBili  x   /  AST  80[H]  /  ALT  284[H]  /  AlkPhos  258[H]  11-14    Urinalysis Basic - ( 14 Nov 2024 06:54 )    Color: x / Appearance: x / SG: x / pH: x  Gluc: 116 mg/dL / Ketone: x  / Bili: x / Urobili: x   Blood: x / Protein: x / Nitrite: x   Leuk Esterase: x / RBC: x / WBC x   Sq Epi: x / Non Sq Epi: x / Bacteria: x    Vital Signs Last 24 Hrs  T(C): 37.1 (15 Nov 2024 07:24), Max: 37.3 (14 Nov 2024 19:22)  T(F): 98.8 (15 Nov 2024 07:24), Max: 99.2 (14 Nov 2024 19:22)  HR: 64 (15 Nov 2024 07:24) (64 - 82)  BP: 120/62 (15 Nov 2024 07:24) (114/74 - 146/75)  BP(mean): --  RR: 16 (15 Nov 2024 07:24) (16 - 16)  SpO2: 96% (15 Nov 2024 07:24) (94% - 96%)    Parameters below as of 15 Nov 2024 07:24  Patient On (Oxygen Delivery Method): room air      Gen - NAD, Comfortable  HEENT - NCAT, EOMI, MMM, PERRLA, Normal Conjunctivae  Neck - Supple, No limited ROM  Pulm - CTAB, No wheeze, No rhonchi, No crackles  Cardiovascular - RRR, S1S2, No murmurs  Chest - good chest expansion, good respiratory effort  Abdomen - Soft, NT, +BS, Abd rounded   Extremities - No C/C, no calf tenderness  Neuro-     Cognitive - awake, alert, oriented to person, place, date, year, and situation.  Able  to follow commands     Communication - Fluent, Comprehensible, No dysarthria, No aphasia      Attention: Impaired w/delayed procressing     Cranial Nerves -No facial asymmetry, Tongue midline, EOMI, Shoulder shrug intact     Motor -                    LEFT    UE - ShAB 5/5, EF 5/5, EE 5/5,  5/5                    RIGHT UE - ShAB 5/5, EF 5/5, EE 5/5,   5/5                    LEFT    LE - HF 5/5, KE 5/5, DF 5/5, PF 5/5                    RIGHT LE - HF 5/5, KE 5/5, DF 5/5, PF 5/5        Sensory - Intact  to LT      Tone - Normal  Psychiatric - Mood stable, Affect flat  Skin:  R anterior cervical neck wound 1x1cm, 25cm mid sternal incision , well approximated, with steri strips, 2 lap sitex under 1.5 cm each, R anterior shoulder incision 7cm , moisture associated dermatitis groin        Continue comprehensive acute rehab program consisting of 3hrs/day of OT/PT and SLP.

## 2024-11-15 NOTE — DISCHARGE NOTE PROVIDER - NSDCFUSCHEDAPPT_GEN_ALL_CORE_FT
Reva Sandoval  [facility]  [facility] [address]  Scheduled Appointment: 11/21/2024     Reva Sandoval  [facility]  [facility] [address]  Scheduled Appointment: 11/21/2024    Alexsander Mondragon  [facility]  CTSURG [address]  Scheduled Appointment: 11/27/2024

## 2024-11-15 NOTE — DISCHARGE NOTE PROVIDER - NSDCCPCAREPLAN_GEN_ALL_CORE_FT
PRINCIPAL DISCHARGE DIAGNOSIS  Diagnosis: S/P aorta repair  Assessment and Plan of Treatment:      PRINCIPAL DISCHARGE DIAGNOSIS  Diagnosis: S/P aorta repair  Assessment and Plan of Treatment: continue aspirin, continue BP regimen. Check BP daily. Follow up your surgeon in 1 week.      SECONDARY DISCHARGE DIAGNOSES  Diagnosis: Elevated LFTs  Assessment and Plan of Treatment: Follow up PCP. Avoid Tylenol.    Diagnosis: Seizure  Assessment and Plan of Treatment: continue Keppra, follow up Neurology in 1 week. Follow up MRI needed.

## 2024-11-15 NOTE — PROGRESS NOTE ADULT - NS ATTEND AMEND GEN_ALL_CORE FT
: Patient is doing well. participating in therapy and making gains-   Discussed with medical consultants  Discussed in IDR rounds  elevated WBC- no clear cause- stable   elevated LFT- US negative as is hepatic panel  making functional gains , processing improving   dc planning

## 2024-11-15 NOTE — DISCHARGE NOTE NURSING/CASE MANAGEMENT/SOCIAL WORK - FINANCIAL ASSISTANCE
[facility] provides services at a reduced cost to those who are determined to be eligible through [facility]’s financial assistance program. Information regarding [facility]’s financial assistance program can be found by going to [email or web address] or by calling [phone].

## 2024-11-15 NOTE — DISCHARGE NOTE PROVIDER - NSDCMRMEDTOKEN_GEN_ALL_CORE_FT
acetaminophen 325 mg oral tablet: 3 tab(s) orally every 6 hours As needed Temp greater or equal to 38C (100.4F), Mild Pain (1 - 3), Moderate Pain (4 - 6), Severe Pain (7 - 10)  aspirin 81 mg oral tablet, chewable: 1 tab(s) orally once a day  cloNIDine 0.1 mg oral tablet: 1 tab(s) orally 3 times a day  enoxaparin: 40 milligram(s) subcutaneous every 24 hours  furosemide 40 mg oral tablet: 1 tab(s) orally once a day Take for 7 days  levETIRAcetam 1000 mg oral tablet: 1 tab(s) orally 2 times a day  methocarbamol 750 mg oral tablet: 1 tab(s) orally 3 times a day as needed for  muscle spasm  metoprolol tartrate 50 mg oral tablet: 1 tab(s) orally 2 times a day  pantoprazole 40 mg oral delayed release tablet: 1 tab(s) orally once a day (before a meal)  potassium chloride 20 mEq oral tablet, extended release: 2 tab(s) orally once a day Take for 7 days while taking Lasix. Stop if Lasix is stopped.   aspirin 81 mg oral tablet, chewable: 1 tab(s) orally once a day  cloNIDine 0.1 mg oral tablet: 1 tab(s) orally 3 times a day  furosemide 40 mg oral tablet: 1 tab(s) orally once a day  levETIRAcetam 1000 mg oral tablet: 1 tab(s) orally 2 times a day  metoprolol tartrate 50 mg oral tablet: 1 tab(s) orally 2 times a day  pantoprazole 40 mg oral delayed release tablet: 1 tab(s) orally once a day (before a meal)  potassium chloride 20 mEq oral tablet, extended release: 1 tab(s) orally once a day  senna leaf extract oral tablet: 2 tab(s) orally once a day (at bedtime)

## 2024-11-16 PROCEDURE — 99232 SBSQ HOSP IP/OBS MODERATE 35: CPT

## 2024-11-16 RX ADMIN — METOPROLOL TARTRATE 50 MILLIGRAM(S): 100 TABLET, FILM COATED ORAL at 17:45

## 2024-11-16 RX ADMIN — METOPROLOL TARTRATE 50 MILLIGRAM(S): 100 TABLET, FILM COATED ORAL at 06:38

## 2024-11-16 RX ADMIN — LEVETIRACETAM 1000 MILLIGRAM(S): 1000 TABLET ORAL at 06:38

## 2024-11-16 RX ADMIN — Medication 81 MILLIGRAM(S): at 11:18

## 2024-11-16 RX ADMIN — LEVETIRACETAM 1000 MILLIGRAM(S): 1000 TABLET ORAL at 17:44

## 2024-11-16 RX ADMIN — CLONIDINE HYDROCHLORIDE 0.1 MILLIGRAM(S): 0.3 TABLET ORAL at 06:38

## 2024-11-16 RX ADMIN — CLONIDINE HYDROCHLORIDE 0.1 MILLIGRAM(S): 0.3 TABLET ORAL at 22:05

## 2024-11-16 RX ADMIN — PANTOPRAZOLE SODIUM 40 MILLIGRAM(S): 40 TABLET, DELAYED RELEASE ORAL at 06:38

## 2024-11-16 RX ADMIN — Medication 2 TABLET(S): at 22:04

## 2024-11-16 RX ADMIN — POTASSIUM CHLORIDE 20 MILLIEQUIVALENT(S): 600 TABLET, EXTENDED RELEASE ORAL at 11:17

## 2024-11-16 RX ADMIN — FUROSEMIDE 40 MILLIGRAM(S): 40 TABLET ORAL at 06:39

## 2024-11-16 RX ADMIN — ENOXAPARIN SODIUM 40 MILLIGRAM(S): 30 INJECTION SUBCUTANEOUS at 22:04

## 2024-11-16 NOTE — PROGRESS NOTE ADULT - SUBJECTIVE AND OBJECTIVE BOX
HPI:  Patient seen and examined, no acute overnight events. Slept well.  Mild incisional site discomfort. Pain controlled, no chest pain, no N/V, no Fevers/Chills. No other new ROS  Has been tolerating rehabilitation program.      MEDICATIONS:  aspirin  chewable 81 milliGRAM(s) Oral daily  cloNIDine 0.1 milliGRAM(s) Oral three times a day  enoxaparin Injectable 40 milliGRAM(s) SubCutaneous every 24 hours  furosemide    Tablet 40 milliGRAM(s) Oral daily  influenza   Vaccine 0.5 milliLiter(s) IntraMuscular once  levETIRAcetam 1000 milliGRAM(s) Oral two times a day  melatonin 6 milliGRAM(s) Oral at bedtime PRN  methocarbamol 500 milliGRAM(s) Oral every 8 hours PRN  metoprolol tartrate 50 milliGRAM(s) Oral two times a day  pantoprazole    Tablet 40 milliGRAM(s) Oral before breakfast  polyethylene glycol 3350 17 Gram(s) Oral daily  potassium chloride    Tablet ER 20 milliEquivalent(s) Oral daily  senna 2 Tablet(s) Oral at bedtime      VITALS:  T(C): 37.1 (11-16-24 @ 08:21), Max: 37.5 (11-15-24 @ 21:50)  HR: 68 (11-16-24 @ 08:21) (68 - 80)  BP: 113/62 (11-16-24 @ 08:21) (113/62 - 153/71)  RR: 16 (11-16-24 @ 08:21) (16 - 16)  SpO2: 96% (11-16-24 @ 08:21) (93% - 96%)    PHYSICAL EXAM:  In NAD  HEENT- NCAT  Heart- RRR, S1S2  Lungs- CTA bl.  Abd- + BS, NT  Ext- No calf pain  Neuro- Exam unchanged    LABS:                         10.2   15.01 )-----------( 569      ( 14 Nov 2024 06:54 )             31.2     11-14    139  |  101  |  14  ----------------------------<  116[H]  4.2   |  27  |  1.15    Ca    9.2      14 Nov 2024 06:54    TPro  7.5  /  Alb  2.6[L]  /  TBili  0.6  /  DBili  x   /  AST  80[H]  /  ALT  284[H]  /  AlkPhos  258[H]  11-14        Imp: Patient with diagnosis of     Dissection of ascending aorta and aortic arch s/p surgical repair     admitted for comprehensive acute rehabilitation.    Plan:    #Dissection of ascending aorta and aortic arch s/p surgical repair  - Continue PT/OT/SLP  - CTA chest significant for Aortic dissection ([facility])      * Emergent transfer to [facility] OR for BioBental reconstruction RCA on 10/29 with Dr. Grimm   - S/P repair of type A aortic dissection by Bentall procedure with reconstruction of right coronary artery, dissection of aortic root, and aortic valve conduit by Dr. Grimm (10/29/24)      * Sternal precautions; WBAT       * Daily weights  - Prior EKG- NSR with PACs w/ LBB (11/5)       * Lasix 40mg daily       * Aspirin 81mg daily       * Metoprolol 50mg BID       * Pain management: Robaxin to prn, Tylenol hold-elevated LFTs  - Clinical suspicion from Neurology of a right frontoparietal stroke      * Deficits: Left hemiparesis, moderate cog deficits, delayed processing,  dysarthric , left inattention     #Recurrent Fever with leukocytosis  - Negative BCx 11/4, 11/7  - UA neg  - Afebrile overnight  - Continue to monitor temp and trend CBC    #Seizure  -Post op isolated seizure  - S/p cEEG structural abnormal, but no events  - CT Head no acute findings, repeat CTH negative   - Recommend f/u outpatient MRI brain- 6w post op  - Keppra 1000mg BID  - Seizure precautions     #Post HTN  - Clonidine 0.1mg TID  - Metoprolol 50mg BID  - Monitor BP     #GI/Bowel:  Senna QHS, Miralax PRN Daily  GI ppx: Pantoprazole 40mg daily     #Skin/Pressure Injury:   - Skin assessment on admission: R anterior cervical neck wound 1x1cm, 25cm mid sternal incision , well approximated, with steri strips, 2 lap site under 1.5 cm each, R anterior shoulder incision 7cm, moisture associated dermatitis groin   - Nystatin BID    #DVT prophylaxis  - lovenox 40mg qhs    - Continue current medications; patient medically stable.   - Patient is stable to continue current rehabilitation program.

## 2024-11-16 NOTE — PROGRESS NOTE ADULT - SUBJECTIVE AND OBJECTIVE BOX
PROGRESS NOTE:     Patient is a 39y old  Male who presents with a chief complaint of Aortic Dissection (12 Nov 2024 10:07)          SUBJECTIVE & OBJECTIVE:   Pt seen and examined at bedside in AM    no overnight events.       REVIEW OF SYSTEMS: remaining ROS negative     PHYSICAL EXAM:  VITALS:  Vital Signs Last 24 Hrs  T(C): 37.1 (16 Nov 2024 08:21), Max: 37.5 (15 Nov 2024 21:50)  T(F): 98.8 (16 Nov 2024 08:21), Max: 99.5 (15 Nov 2024 21:50)  HR: 68 (16 Nov 2024 08:21) (68 - 80)  BP: 113/62 (16 Nov 2024 08:21) (113/62 - 153/71)  BP(mean): --  RR: 16 (16 Nov 2024 08:21) (16 - 16)  SpO2: 96% (16 Nov 2024 08:21) (93% - 96%)    Parameters below as of 16 Nov 2024 08:21  Patient On (Oxygen Delivery Method): room air      GENERAL: NAD,  no increased WOB  HEAD:  Atraumatic, Normocephalic  EYES: EOMI,  conjunctiva and sclera clear  ENMT: Moist mucous membranes  NECK: Supple, No JVD  NERVOUS SYSTEM:  Alert  CHEST/LUNG: Clear to auscultation bilaterally; No rales, rhonchi, wheezing  HEART: Regular rate and rhythm  ABDOMEN: Soft, Nontender, Nondistended; Bowel sounds present  EXTREMITIES: No clubbing, cyanosis, calf tenderness or edema b/l      MEDICATIONS  (STANDING):  aspirin  chewable 81 milliGRAM(s) Oral daily  cloNIDine 0.1 milliGRAM(s) Oral three times a day  enoxaparin Injectable 40 milliGRAM(s) SubCutaneous every 24 hours  furosemide    Tablet 40 milliGRAM(s) Oral daily  influenza   Vaccine 0.5 milliLiter(s) IntraMuscular once  levETIRAcetam 1000 milliGRAM(s) Oral two times a day  metoprolol tartrate 50 milliGRAM(s) Oral two times a day  pantoprazole    Tablet 40 milliGRAM(s) Oral before breakfast  polyethylene glycol 3350 17 Gram(s) Oral daily  potassium chloride    Tablet ER 20 milliEquivalent(s) Oral daily  senna 2 Tablet(s) Oral at bedtime    MEDICATIONS  (PRN):  melatonin 6 milliGRAM(s) Oral at bedtime PRN Insomnia  methocarbamol 500 milliGRAM(s) Oral every 8 hours PRN Muscle Spasm        Allergies    Allergy Status Unknown  No Known Allergies    Intolerances              LABS:                           10.2   15.01 )-----------( 569      ( 14 Nov 2024 06:54 )             31.2     11-14    139  |  101  |  14  ----------------------------<  116[H]  4.2   |  27  |  1.15    Ca    9.2      14 Nov 2024 06:54    TPro  7.5  /  Alb  2.6[L]  /  TBili  0.6  /  DBili  x   /  AST  80[H]  /  ALT  284[H]  /  AlkPhos  258[H]  11-14    LIVER FUNCTIONS - ( 14 Nov 2024 06:54 )  Alb: 2.6 g/dL / Pro: 7.5 g/dL / ALK PHOS: 258 U/L / ALT: 284 U/L / AST: 80 U/L / GGT: x             Urinalysis Basic - ( 14 Nov 2024 06:54 )    Color: x / Appearance: x / SG: x / pH: x  Gluc: 116 mg/dL / Ketone: x  / Bili: x / Urobili: x   Blood: x / Protein: x / Nitrite: x   Leuk Esterase: x / RBC: x / WBC x   Sq Epi: x / Non Sq Epi: x / Bacteria: x      CAPILLARY BLOOD GLUCOSE                    RECENT CULTURES:          RADIOLOGY & ADDITIONAL TESTS:    < from: US Abdomen Upper Quadrant Right (11.12.24 @ 11:58) >  IMPRESSION:  Minimal/equivocally increased hepatic echotexture without focal   abnormality, raises the possibility of steatosis    < end of copied text >

## 2024-11-17 PROCEDURE — 99232 SBSQ HOSP IP/OBS MODERATE 35: CPT

## 2024-11-17 RX ADMIN — Medication 81 MILLIGRAM(S): at 11:24

## 2024-11-17 RX ADMIN — FUROSEMIDE 40 MILLIGRAM(S): 40 TABLET ORAL at 05:58

## 2024-11-17 RX ADMIN — LEVETIRACETAM 1000 MILLIGRAM(S): 1000 TABLET ORAL at 05:58

## 2024-11-17 RX ADMIN — CLONIDINE HYDROCHLORIDE 0.1 MILLIGRAM(S): 0.3 TABLET ORAL at 05:58

## 2024-11-17 RX ADMIN — ENOXAPARIN SODIUM 40 MILLIGRAM(S): 30 INJECTION SUBCUTANEOUS at 21:43

## 2024-11-17 RX ADMIN — CLONIDINE HYDROCHLORIDE 0.1 MILLIGRAM(S): 0.3 TABLET ORAL at 21:43

## 2024-11-17 RX ADMIN — LEVETIRACETAM 1000 MILLIGRAM(S): 1000 TABLET ORAL at 17:19

## 2024-11-17 RX ADMIN — POTASSIUM CHLORIDE 20 MILLIEQUIVALENT(S): 600 TABLET, EXTENDED RELEASE ORAL at 11:24

## 2024-11-17 RX ADMIN — CLONIDINE HYDROCHLORIDE 0.1 MILLIGRAM(S): 0.3 TABLET ORAL at 13:25

## 2024-11-17 RX ADMIN — METOPROLOL TARTRATE 50 MILLIGRAM(S): 100 TABLET, FILM COATED ORAL at 05:58

## 2024-11-17 RX ADMIN — PANTOPRAZOLE SODIUM 40 MILLIGRAM(S): 40 TABLET, DELAYED RELEASE ORAL at 05:58

## 2024-11-17 NOTE — PROGRESS NOTE ADULT - SUBJECTIVE AND OBJECTIVE BOX
PROGRESS NOTE:     Patient is a 39y old  Male who presents with a chief complaint of Aortic Dissection (12 Nov 2024 10:07)          SUBJECTIVE & OBJECTIVE:   Pt seen and examined at bedside in AM    no overnight events.       REVIEW OF SYSTEMS: remaining ROS negative     PHYSICAL EXAM:  VITALS:  Vital Signs Last 24 Hrs  T(C): 37.1 (17 Nov 2024 09:06), Max: 37.6 (16 Nov 2024 19:57)  T(F): 98.8 (17 Nov 2024 09:06), Max: 99.6 (16 Nov 2024 19:57)  HR: 71 (17 Nov 2024 09:06) (71 - 85)  BP: 125/74 (17 Nov 2024 09:06) (105/64 - 127/71)  BP(mean): --  RR: 16 (17 Nov 2024 09:06) (16 - 16)  SpO2: 96% (17 Nov 2024 09:06) (94% - 96%)    Parameters below as of 17 Nov 2024 09:06  Patient On (Oxygen Delivery Method): room air      GENERAL: NAD,  no increased WOB  HEAD:  Atraumatic, Normocephalic  EYES: EOMI,  conjunctiva and sclera clear  ENMT: Moist mucous membranes  NECK: Supple, No JVD  NERVOUS SYSTEM:  Alert  CHEST/LUNG: Clear to auscultation bilaterally; No rales, rhonchi, wheezing  HEART: Regular rate and rhythm  ABDOMEN: Soft, Nontender, Nondistended; Bowel sounds present  EXTREMITIES: No clubbing, cyanosis, calf tenderness or edema b/l      MEDICATIONS  (STANDING):  aspirin  chewable 81 milliGRAM(s) Oral daily  cloNIDine 0.1 milliGRAM(s) Oral three times a day  enoxaparin Injectable 40 milliGRAM(s) SubCutaneous every 24 hours  furosemide    Tablet 40 milliGRAM(s) Oral daily  influenza   Vaccine 0.5 milliLiter(s) IntraMuscular once  levETIRAcetam 1000 milliGRAM(s) Oral two times a day  metoprolol tartrate 50 milliGRAM(s) Oral two times a day  pantoprazole    Tablet 40 milliGRAM(s) Oral before breakfast  polyethylene glycol 3350 17 Gram(s) Oral daily  potassium chloride    Tablet ER 20 milliEquivalent(s) Oral daily  senna 2 Tablet(s) Oral at bedtime    MEDICATIONS  (PRN):  melatonin 6 milliGRAM(s) Oral at bedtime PRN Insomnia  methocarbamol 500 milliGRAM(s) Oral every 8 hours PRN Muscle Spasm          Allergies    Allergy Status Unknown  No Known Allergies    Intolerances              LABS:                           10.2   15.01 )-----------( 569      ( 14 Nov 2024 06:54 )             31.2     11-14    139  |  101  |  14  ----------------------------<  116[H]  4.2   |  27  |  1.15    Ca    9.2      14 Nov 2024 06:54    TPro  7.5  /  Alb  2.6[L]  /  TBili  0.6  /  DBili  x   /  AST  80[H]  /  ALT  284[H]  /  AlkPhos  258[H]  11-14    LIVER FUNCTIONS - ( 14 Nov 2024 06:54 )  Alb: 2.6 g/dL / Pro: 7.5 g/dL / ALK PHOS: 258 U/L / ALT: 284 U/L / AST: 80 U/L / GGT: x             Urinalysis Basic - ( 14 Nov 2024 06:54 )    Color: x / Appearance: x / SG: x / pH: x  Gluc: 116 mg/dL / Ketone: x  / Bili: x / Urobili: x   Blood: x / Protein: x / Nitrite: x   Leuk Esterase: x / RBC: x / WBC x   Sq Epi: x / Non Sq Epi: x / Bacteria: x      CAPILLARY BLOOD GLUCOSE                    RECENT CULTURES:          RADIOLOGY & ADDITIONAL TESTS:    < from: US Abdomen Upper Quadrant Right (11.12.24 @ 11:58) >  IMPRESSION:  Minimal/equivocally increased hepatic echotexture without focal   abnormality, raises the possibility of steatosis    < end of copied text >

## 2024-11-17 NOTE — PROGRESS NOTE ADULT - SUBJECTIVE AND OBJECTIVE BOX
HPI:  Patient seen and examined, no acute overnight events. Slept well.  Mild incisional site discomfort. Pain controlled, no chest pain, no N/V, no Fevers/Chills. No other new ROS  Has been tolerating rehabilitation program.      MEDICATIONS:  aspirin  chewable 81 milliGRAM(s) Oral daily  cloNIDine 0.1 milliGRAM(s) Oral three times a day  enoxaparin Injectable 40 milliGRAM(s) SubCutaneous every 24 hours  furosemide    Tablet 40 milliGRAM(s) Oral daily  influenza   Vaccine 0.5 milliLiter(s) IntraMuscular once  levETIRAcetam 1000 milliGRAM(s) Oral two times a day  melatonin 6 milliGRAM(s) Oral at bedtime PRN  methocarbamol 500 milliGRAM(s) Oral every 8 hours PRN  metoprolol tartrate 50 milliGRAM(s) Oral two times a day  pantoprazole    Tablet 40 milliGRAM(s) Oral before breakfast  polyethylene glycol 3350 17 Gram(s) Oral daily  potassium chloride    Tablet ER 20 milliEquivalent(s) Oral daily  senna 2 Tablet(s) Oral at bedtime          T(C): 37.1 (11-17-24 @ 09:06), Max: 37.6 (11-16-24 @ 19:57)  T(F): 98.8 (11-17-24 @ 09:06), Max: 99.6 (11-16-24 @ 19:57)  HR: 71 (11-17-24 @ 09:06) (71 - 85)  BP: 125/74 (11-17-24 @ 09:06) (105/64 - 127/71)  RR: 16 (11-17-24 @ 09:06) (16 - 16)  SpO2: 96% (11-17-24 @ 09:06) (94% - 96%)              PHYSICAL EXAM:  In NAD  HEENT- NCAT  Heart- RRR, S1S2  Lungs- CTA bl.  Abd- + BS, NT  Ext- No calf pain  Neuro- Exam unchanged    LABS:                         10.2   15.01 )-----------( 569      ( 14 Nov 2024 06:54 )             31.2     11-14    139  |  101  |  14  ----------------------------<  116[H]  4.2   |  27  |  1.15    Ca    9.2      14 Nov 2024 06:54    TPro  7.5  /  Alb  2.6[L]  /  TBili  0.6  /  DBili  x   /  AST  80[H]  /  ALT  284[H]  /  AlkPhos  258[H]  11-14        Imp: Patient with diagnosis of     Dissection of ascending aorta and aortic arch s/p surgical repair     admitted for comprehensive acute rehabilitation.    Plan:    #Dissection of ascending aorta and aortic arch s/p surgical repair  - Continue PT/OT/SLP  - CTA chest significant for Aortic dissection (HNT)      * Emergent transfer to [facility] OR for BioBental reconstruction RCA on 10/29 with Dr. Grimm   - S/P repair of type A aortic dissection by Bentall procedure with reconstruction of right coronary artery, dissection of aortic root, and aortic valve conduit by Dr. Grimm (10/29/24)      * Sternal precautions; WBAT       * Daily weights  - Prior EKG- NSR with PACs w/ LBB (11/5)       * Lasix 40mg daily       * Aspirin 81mg daily       * Metoprolol 50mg BID       * Pain management: Robaxin to prn, Tylenol hold-elevated LFTs  - Clinical suspicion from Neurology of a right frontoparietal stroke      * Deficits: Left hemiparesis, moderate cog deficits, delayed processing,  dysarthric , left inattention     #Recurrent Fever with leukocytosis  - Negative BCx 11/4, 11/7  - UA neg  - Afebrile overnight  - Continue to monitor temp and trend CBC    #Seizure  -Post op isolated seizure  - S/p cEEG structural abnormal, but no events  - CT Head no acute findings, repeat CTH negative   - Recommend f/u outpatient MRI brain- 6w post op  - Keppra 1000mg BID  - Seizure precautions     #Post HTN  - Clonidine 0.1mg TID  - Metoprolol 50mg BID  - Monitor BP     #GI/Bowel:  Senna QHS, Miralax PRN Daily  GI ppx: Pantoprazole 40mg daily     #Skin/Pressure Injury:   - Skin assessment on admission: R anterior cervical neck wound 1x1cm, 25cm mid sternal incision , well approximated, with steri strips, 2 lap site under 1.5 cm each, R anterior shoulder incision 7cm, moisture associated dermatitis groin   - Nystatin BID    #DVT prophylaxis  - lovenox 40mg qhs    - Continue current medications; patient medically stable.   - Patient is stable to continue current rehabilitation program.

## 2024-11-18 LAB
ALBUMIN SERPL ELPH-MCNC: 2.6 G/DL — LOW (ref 3.3–5)
ALP SERPL-CCNC: 239 U/L — HIGH (ref 40–120)
ALT FLD-CCNC: 210 U/L — HIGH (ref 10–45)
ANION GAP SERPL CALC-SCNC: 8 MMOL/L — SIGNIFICANT CHANGE UP (ref 5–17)
ANISOCYTOSIS BLD QL: SLIGHT — SIGNIFICANT CHANGE UP
AST SERPL-CCNC: 57 U/L — HIGH (ref 10–40)
BASOPHILS # BLD AUTO: 0.12 K/UL — SIGNIFICANT CHANGE UP (ref 0–0.2)
BASOPHILS NFR BLD AUTO: 1 % — SIGNIFICANT CHANGE UP (ref 0–2)
BILIRUB SERPL-MCNC: 0.5 MG/DL — SIGNIFICANT CHANGE UP (ref 0.2–1.2)
BUN SERPL-MCNC: 14 MG/DL — SIGNIFICANT CHANGE UP (ref 7–23)
BURR CELLS BLD QL SMEAR: PRESENT — SIGNIFICANT CHANGE UP
CALCIUM SERPL-MCNC: 9.1 MG/DL — SIGNIFICANT CHANGE UP (ref 8.4–10.5)
CHLORIDE SERPL-SCNC: 104 MMOL/L — SIGNIFICANT CHANGE UP (ref 96–108)
CO2 SERPL-SCNC: 29 MMOL/L — SIGNIFICANT CHANGE UP (ref 22–31)
CREAT SERPL-MCNC: 1.2 MG/DL — SIGNIFICANT CHANGE UP (ref 0.5–1.3)
EGFR: 80 ML/MIN/1.73M2 — SIGNIFICANT CHANGE UP
EOSINOPHIL # BLD AUTO: 0.12 K/UL — SIGNIFICANT CHANGE UP (ref 0–0.5)
EOSINOPHIL NFR BLD AUTO: 1 % — SIGNIFICANT CHANGE UP (ref 0–6)
GLUCOSE SERPL-MCNC: 115 MG/DL — HIGH (ref 70–99)
HCT VFR BLD CALC: 34 % — LOW (ref 39–50)
HGB BLD-MCNC: 11 G/DL — LOW (ref 13–17)
HYPOCHROMIA BLD QL: SLIGHT — SIGNIFICANT CHANGE UP
LYMPHOCYTES # BLD AUTO: 1.81 K/UL — SIGNIFICANT CHANGE UP (ref 1–3.3)
LYMPHOCYTES # BLD AUTO: 15 % — SIGNIFICANT CHANGE UP (ref 13–44)
MANUAL SMEAR VERIFICATION: SIGNIFICANT CHANGE UP
MCHC RBC-ENTMCNC: 27.9 PG — SIGNIFICANT CHANGE UP (ref 27–34)
MCHC RBC-ENTMCNC: 32.4 G/DL — SIGNIFICANT CHANGE UP (ref 32–36)
MCV RBC AUTO: 86.3 FL — SIGNIFICANT CHANGE UP (ref 80–100)
METAMYELOCYTES # FLD: 1 % — HIGH (ref 0–0)
MICROCYTES BLD QL: SLIGHT — SIGNIFICANT CHANGE UP
MONOCYTES # BLD AUTO: 0.97 K/UL — HIGH (ref 0–0.9)
MONOCYTES NFR BLD AUTO: 8 % — SIGNIFICANT CHANGE UP (ref 2–14)
NEUTROPHILS # BLD AUTO: 8.95 K/UL — HIGH (ref 1.8–7.4)
NEUTROPHILS NFR BLD AUTO: 74 % — SIGNIFICANT CHANGE UP (ref 43–77)
NRBC # BLD: 0 /100 WBCS — SIGNIFICANT CHANGE UP (ref 0–0)
OVALOCYTES BLD QL SMEAR: SLIGHT — SIGNIFICANT CHANGE UP
PLAT MORPH BLD: NORMAL — SIGNIFICANT CHANGE UP
PLATELET # BLD AUTO: 576 K/UL — HIGH (ref 150–400)
POIKILOCYTOSIS BLD QL AUTO: SLIGHT — SIGNIFICANT CHANGE UP
POTASSIUM SERPL-MCNC: 4.5 MMOL/L — SIGNIFICANT CHANGE UP (ref 3.5–5.3)
POTASSIUM SERPL-SCNC: 4.5 MMOL/L — SIGNIFICANT CHANGE UP (ref 3.5–5.3)
PROT SERPL-MCNC: 7.6 G/DL — SIGNIFICANT CHANGE UP (ref 6–8.3)
RBC # BLD: 3.94 M/UL — LOW (ref 4.2–5.8)
RBC # FLD: 13.9 % — SIGNIFICANT CHANGE UP (ref 10.3–14.5)
RBC BLD AUTO: ABNORMAL
SCHISTOCYTES BLD QL AUTO: SLIGHT — SIGNIFICANT CHANGE UP
SODIUM SERPL-SCNC: 141 MMOL/L — SIGNIFICANT CHANGE UP (ref 135–145)
STOMATOCYTES BLD QL SMEAR: SLIGHT — SIGNIFICANT CHANGE UP
WBC # BLD: 12.09 K/UL — HIGH (ref 3.8–10.5)
WBC # FLD AUTO: 12.09 K/UL — HIGH (ref 3.8–10.5)

## 2024-11-18 PROCEDURE — 99232 SBSQ HOSP IP/OBS MODERATE 35: CPT

## 2024-11-18 PROCEDURE — 99232 SBSQ HOSP IP/OBS MODERATE 35: CPT | Mod: GC

## 2024-11-18 RX ORDER — LEVETIRACETAM 1000 MG/1
1 TABLET ORAL
Qty: 60 | Refills: 0
Start: 2024-11-18 | End: 2024-12-17

## 2024-11-18 RX ORDER — CLONIDINE HYDROCHLORIDE 0.3 MG/1
1 TABLET ORAL
Qty: 90 | Refills: 0
Start: 2024-11-18 | End: 2024-12-17

## 2024-11-18 RX ORDER — PANTOPRAZOLE SODIUM 40 MG/1
1 TABLET, DELAYED RELEASE ORAL
Qty: 30 | Refills: 0
Start: 2024-11-18 | End: 2024-12-17

## 2024-11-18 RX ORDER — SENNOSIDES 8.6 MG
2 TABLET ORAL
Qty: 0 | Refills: 0 | DISCHARGE
Start: 2024-11-18

## 2024-11-18 RX ORDER — POTASSIUM CHLORIDE 10 MEQ
2 TABLET, EXTENDED RELEASE ORAL
Qty: 0 | Refills: 0 | DISCHARGE
End: 2024-11-18

## 2024-11-18 RX ORDER — POTASSIUM CHLORIDE 600 MG/1
1 TABLET, EXTENDED RELEASE ORAL
Qty: 30 | Refills: 0
Start: 2024-11-18 | End: 2024-12-17

## 2024-11-18 RX ORDER — FUROSEMIDE 40 MG/1
1 TABLET ORAL
Qty: 30 | Refills: 0
Start: 2024-11-18 | End: 2024-12-17

## 2024-11-18 RX ORDER — METOPROLOL TARTRATE 100 MG/1
1 TABLET, FILM COATED ORAL
Qty: 60 | Refills: 0
Start: 2024-11-18 | End: 2024-12-17

## 2024-11-18 RX ADMIN — Medication 81 MILLIGRAM(S): at 11:32

## 2024-11-18 RX ADMIN — POTASSIUM CHLORIDE 20 MILLIEQUIVALENT(S): 600 TABLET, EXTENDED RELEASE ORAL at 11:33

## 2024-11-18 RX ADMIN — ENOXAPARIN SODIUM 40 MILLIGRAM(S): 30 INJECTION SUBCUTANEOUS at 22:10

## 2024-11-18 RX ADMIN — LEVETIRACETAM 1000 MILLIGRAM(S): 1000 TABLET ORAL at 05:20

## 2024-11-18 RX ADMIN — Medication 2 TABLET(S): at 22:10

## 2024-11-18 RX ADMIN — LEVETIRACETAM 1000 MILLIGRAM(S): 1000 TABLET ORAL at 17:34

## 2024-11-18 RX ADMIN — CLONIDINE HYDROCHLORIDE 0.1 MILLIGRAM(S): 0.3 TABLET ORAL at 22:10

## 2024-11-18 RX ADMIN — METOPROLOL TARTRATE 50 MILLIGRAM(S): 100 TABLET, FILM COATED ORAL at 05:20

## 2024-11-18 RX ADMIN — METOPROLOL TARTRATE 50 MILLIGRAM(S): 100 TABLET, FILM COATED ORAL at 17:34

## 2024-11-18 RX ADMIN — CLONIDINE HYDROCHLORIDE 0.1 MILLIGRAM(S): 0.3 TABLET ORAL at 14:19

## 2024-11-18 RX ADMIN — CLONIDINE HYDROCHLORIDE 0.1 MILLIGRAM(S): 0.3 TABLET ORAL at 05:20

## 2024-11-18 RX ADMIN — FUROSEMIDE 40 MILLIGRAM(S): 40 TABLET ORAL at 05:19

## 2024-11-18 RX ADMIN — PANTOPRAZOLE SODIUM 40 MILLIGRAM(S): 40 TABLET, DELAYED RELEASE ORAL at 05:20

## 2024-11-18 NOTE — PROGRESS NOTE ADULT - SUBJECTIVE AND OBJECTIVE BOX
HPI:  Mr. Brennan Schneider is a 39-year-old male patient with no previous past medical history who originally presented to [facility] on 10/29/24 with c/o chest tightness and pain.  Pt had CTA chest significant for Aortic dissection which was noted from the level of the root of the aorta extending into the ascending, aortic arch, descending thoracic aorta as well as extending into the abdominal aorta and was noted to end in the distal left common iliac artery. The dissection flap was in close proximity to the right coronary artery. The left renal artery was arising from the false lumen. He was subsequently transferred to [facility] direct to OR for bio Bentall procedure with reconstruction of the RCA with Dr. Grimm on 10/29/24. Post op course c/b HTN and seizure (treated with IV ativan, propofol, Keppra loaded). CT head/neck no acute findings. Neurology consulted for post op twitching of extremities and left sided facial/arm weakness, dysarthria and left visual neglect. S/p cEEG without acute events, suspicious for right hemisphere structural lesion. Repeat CT Head r/o suspected right frontoparietal stroke, no acute findings (will need f/u MRI 6 weeks postop). Residual left-sided weakness/neglect improving. Patient is optimized and was evaluated by PM&R and therapy for functional deficits, gait/ADL impairments and acute rehabilitation was recommended. Patient was cleared for discharge to [facility] IRF on 11/11/24. (11 Nov 2024 13:13)      NAD overnight, afebrile, some dry cough, using IS. Incisions healing well-GAURAV.   VSS  No Sz reported, denies SOB, NV, or HA. Alert and awake.   US abd for elevated LFTs -no acute findings. Hold Tylenol. Labs following, asymptomatic. No NV.   A&Ox4, but aware of slow processing.   To therapy. DC planning.      MEDICATIONS  (STANDING):  aspirin  chewable 81 milliGRAM(s) Oral daily  cloNIDine 0.1 milliGRAM(s) Oral three times a day  enoxaparin Injectable 40 milliGRAM(s) SubCutaneous every 24 hours  furosemide    Tablet 40 milliGRAM(s) Oral daily  influenza   Vaccine 0.5 milliLiter(s) IntraMuscular once  levETIRAcetam 1000 milliGRAM(s) Oral two times a day  metoprolol tartrate 50 milliGRAM(s) Oral two times a day  pantoprazole    Tablet 40 milliGRAM(s) Oral before breakfast  polyethylene glycol 3350 17 Gram(s) Oral daily  potassium chloride    Tablet ER 20 milliEquivalent(s) Oral daily  senna 2 Tablet(s) Oral at bedtime    MEDICATIONS  (PRN):  melatonin 6 milliGRAM(s) Oral at bedtime PRN Insomnia  methocarbamol 500 milliGRAM(s) Oral every 8 hours PRN Muscle Spasm                            11.0   12.09 )-----------( 576      ( 18 Nov 2024 06:23 )             34.0     11-18    141  |  104  |  14  ----------------------------<  115[H]  4.5   |  29  |  1.20    Ca    9.1      18 Nov 2024 06:23    TPro  7.6  /  Alb  2.6[L]  /  TBili  0.5  /  DBili  x   /  AST  57[H]  /  ALT  210[H]  /  AlkPhos  239[H]  11-18    Urinalysis Basic - ( 18 Nov 2024 06:23 )    Color: x / Appearance: x / SG: x / pH: x  Gluc: 115 mg/dL / Ketone: x  / Bili: x / Urobili: x   Blood: x / Protein: x / Nitrite: x   Leuk Esterase: x / RBC: x / WBC x   Sq Epi: x / Non Sq Epi: x / Bacteria: x        Vital Signs Last 24 Hrs  T(C): 36.7 (18 Nov 2024 07:33), Max: 37 (17 Nov 2024 21:41)  T(F): 98.1 (18 Nov 2024 07:33), Max: 98.6 (17 Nov 2024 21:41)  HR: 66 (18 Nov 2024 07:33) (66 - 78)  BP: 116/60 (18 Nov 2024 07:33) (108/55 - 142/72)  BP(mean): --  RR: 16 (18 Nov 2024 07:33) (16 - 16)  SpO2: 93% (18 Nov 2024 07:33) (93% - 94%)    Parameters below as of 18 Nov 2024 07:33  Patient On (Oxygen Delivery Method): room air      Gen - NAD, Comfortable  HEENT - NCAT, EOMI, MMM, PERRLA, Normal Conjunctivae  Neck - Supple, No limited ROM  Pulm - CTAB, No wheeze, No rhonchi, No crackles  Cardiovascular - RRR, S1S2, No murmurs  Chest - good chest expansion, good respiratory effort  Abdomen - Soft, NT, +BS, Abd rounded   Extremities - No C/C, no calf tenderness  Neuro-     Cognitive - awake, alert, oriented to person, place, date, year, and situation.  Able  to follow commands     Communication - Fluent, Comprehensible, No dysarthria, No aphasia      Attention: Impaired w/delayed procressing     Cranial Nerves -No facial asymmetry, Tongue midline, EOMI, Shoulder shrug intact     Motor -                    LEFT    UE - ShAB 5/5, EF 5/5, EE 5/5,  5/5                    RIGHT UE - ShAB 5/5, EF 5/5, EE 5/5,   5/5                    LEFT    LE - HF 5/5, KE 5/5, DF 5/5, PF 5/5                    RIGHT LE - HF 5/5, KE 5/5, DF 5/5, PF 5/5        Sensory - Intact  to LT      Tone - Normal  Psychiatric - Mood stable, Affect flat  Skin:  R anterior cervical neck wound 1x1cm, 25cm mid sternal incision , well approximated, with steri strips, 2 lap sitex under 1.5 cm each, R anterior shoulder incision 7cm , moisture associated dermatitis groin      Continue comprehensive acute rehab program consisting of 3hrs/day of OT/PT and SLP.

## 2024-11-18 NOTE — PROGRESS NOTE ADULT - SUBJECTIVE AND OBJECTIVE BOX
Interval History  Patient seen and examined at bedside. No acute events noted.  Patient reports feeling well, denies any acute complaints.     ALLERGIES:  Allergy Status Unknown  No Known Allergies    MEDICATIONS  (STANDING):  aspirin  chewable 81 milliGRAM(s) Oral daily  cloNIDine 0.1 milliGRAM(s) Oral three times a day  enoxaparin Injectable 40 milliGRAM(s) SubCutaneous every 24 hours  furosemide    Tablet 40 milliGRAM(s) Oral daily  influenza   Vaccine 0.5 milliLiter(s) IntraMuscular once  levETIRAcetam 1000 milliGRAM(s) Oral two times a day  metoprolol tartrate 50 milliGRAM(s) Oral two times a day  pantoprazole    Tablet 40 milliGRAM(s) Oral before breakfast  polyethylene glycol 3350 17 Gram(s) Oral daily  potassium chloride    Tablet ER 20 milliEquivalent(s) Oral daily  senna 2 Tablet(s) Oral at bedtime    MEDICATIONS  (PRN):  melatonin 6 milliGRAM(s) Oral at bedtime PRN Insomnia  methocarbamol 500 milliGRAM(s) Oral every 8 hours PRN Muscle Spasm    Vital Signs Last 24 Hrs  T(F): 98.1 (18 Nov 2024 07:33), Max: 98.6 (17 Nov 2024 21:41)  HR: 66 (18 Nov 2024 07:33) (66 - 78)  BP: 127/71 (18 Nov 2024 14:20) (108/55 - 142/72)  RR: 16 (18 Nov 2024 14:20) (16 - 16)  SpO2: 94% (18 Nov 2024 14:20) (93% - 94%)  I&O's Summary    PHYSICAL EXAM:  GENERAL: NAD  HEENT: NCAT  CHEST/LUNG: Clear to percussion bilaterally; No rales, rhonchi, wheezing  HEART: Regular rate and rhythm; No murmurs, median sternotomy incision healing well   ABDOMEN: Soft, Nontender, Nondistended; Bowel sounds present  MUSCULOSKELETAL/EXTREMITIES:  2+ Peripheral Pulses, No LE edema  PSYCH: Appropriate affect  NEURO: Alert & Oriented x 3, no facial asymmetry, 5/5 UE/LE     I personally reviewed the below data/images/labs:    LABS:                        11.0   12.09 )-----------( 576      ( 18 Nov 2024 06:23 )             34.0       11-18    141  |  104  |  14  ----------------------------<  115  4.5   |  29  |  1.20    Ca    9.1      18 Nov 2024 06:23    TPro  7.6  /  Alb  2.6  /  TBili  0.5  /  DBili  x   /  AST  57  /  ALT  210  /  AlkPhos  239  11-18                                  Urinalysis Basic - ( 18 Nov 2024 06:23 )    Color: x / Appearance: x / SG: x / pH: x  Gluc: 115 mg/dL / Ketone: x  / Bili: x / Urobili: x   Blood: x / Protein: x / Nitrite: x   Leuk Esterase: x / RBC: x / WBC x   Sq Epi: x / Non Sq Epi: x / Bacteria: x        COVID-19 PCR: Laura (11-11-24 @ 22:41)      Consultant(s) Notes Reviewed:   Care Discused with Consultants/Other Providers:  Imaging Personally Reviewed:        Interval History  Patient seen and examined at bedside. No acute events noted.  Patient reports feeling well, denies any acute complaints.     ALLERGIES:  Allergy Status Unknown  No Known Allergies    MEDICATIONS  (STANDING):  aspirin  chewable 81 milliGRAM(s) Oral daily  cloNIDine 0.1 milliGRAM(s) Oral three times a day  enoxaparin Injectable 40 milliGRAM(s) SubCutaneous every 24 hours  furosemide    Tablet 40 milliGRAM(s) Oral daily  influenza   Vaccine 0.5 milliLiter(s) IntraMuscular once  levETIRAcetam 1000 milliGRAM(s) Oral two times a day  metoprolol tartrate 50 milliGRAM(s) Oral two times a day  pantoprazole    Tablet 40 milliGRAM(s) Oral before breakfast  polyethylene glycol 3350 17 Gram(s) Oral daily  potassium chloride    Tablet ER 20 milliEquivalent(s) Oral daily  senna 2 Tablet(s) Oral at bedtime    MEDICATIONS  (PRN):  melatonin 6 milliGRAM(s) Oral at bedtime PRN Insomnia  methocarbamol 500 milliGRAM(s) Oral every 8 hours PRN Muscle Spasm    Vital Signs Last 24 Hrs  T(F): 98.1 (18 Nov 2024 07:33), Max: 98.6 (17 Nov 2024 21:41)  HR: 66 (18 Nov 2024 07:33) (66 - 78)  BP: 127/71 (18 Nov 2024 14:20) (108/55 - 142/72)  RR: 16 (18 Nov 2024 14:20) (16 - 16)  SpO2: 94% (18 Nov 2024 14:20) (93% - 94%)  I&O's Summary    PHYSICAL EXAM:  GENERAL: NAD  HEENT: NCAT  CHEST/LUNG: Clear to percussion bilaterally; No rales, rhonchi, wheezing  HEART: Regular rate and rhythm; No murmurs, median sternotomy incision healing well   ABDOMEN: Soft, Nontender, Nondistended; Bowel sounds present  MUSCULOSKELETAL/EXTREMITIES:  2+ Peripheral Pulses, No LE edema  PSYCH: Appropriate affect  NEURO: Alert & Oriented x 3, no facial asymmetry, 5/5 UE/LE     I personally reviewed the below data/images/labs:    LABS:                        11.0   12.09 )-----------( 576      ( 18 Nov 2024 06:23 )             34.0       11-18    141  |  104  |  14  ----------------------------<  115  4.5   |  29  |  1.20    Ca    9.1      18 Nov 2024 06:23    TPro  7.6  /  Alb  2.6  /  TBili  0.5  /  DBili  x   /  AST  57  /  ALT  210  /  AlkPhos  239  11-18     Urinalysis Basic - ( 18 Nov 2024 06:23 )    Color: x / Appearance: x / SG: x / pH: x  Gluc: 115 mg/dL / Ketone: x  / Bili: x / Urobili: x   Blood: x / Protein: x / Nitrite: x   Leuk Esterase: x / RBC: x / WBC x   Sq Epi: x / Non Sq Epi: x / Bacteria: x    COVID-19 PCR: Laura (11-11-24 @ 22:41)    Consultant(s) Notes Reviewed:   Care Discused with Consultants/Other Providers:  Imaging Personally Reviewed:

## 2024-11-18 NOTE — PROGRESS NOTE ADULT - NS ATTEND AMEND GEN_ALL_CORE FT
Patient is doing well. participating in therapy and making gains-   Discussed with medical consultants  Discussed in IDR rounds  processing is improving

## 2024-11-19 PROCEDURE — 99232 SBSQ HOSP IP/OBS MODERATE 35: CPT | Mod: GC

## 2024-11-19 RX ADMIN — METOPROLOL TARTRATE 50 MILLIGRAM(S): 100 TABLET, FILM COATED ORAL at 17:30

## 2024-11-19 RX ADMIN — POTASSIUM CHLORIDE 20 MILLIEQUIVALENT(S): 600 TABLET, EXTENDED RELEASE ORAL at 12:43

## 2024-11-19 RX ADMIN — PANTOPRAZOLE SODIUM 40 MILLIGRAM(S): 40 TABLET, DELAYED RELEASE ORAL at 06:08

## 2024-11-19 RX ADMIN — ENOXAPARIN SODIUM 40 MILLIGRAM(S): 30 INJECTION SUBCUTANEOUS at 21:29

## 2024-11-19 RX ADMIN — LEVETIRACETAM 1000 MILLIGRAM(S): 1000 TABLET ORAL at 17:30

## 2024-11-19 RX ADMIN — METOPROLOL TARTRATE 50 MILLIGRAM(S): 100 TABLET, FILM COATED ORAL at 06:08

## 2024-11-19 RX ADMIN — LEVETIRACETAM 1000 MILLIGRAM(S): 1000 TABLET ORAL at 06:08

## 2024-11-19 RX ADMIN — Medication 81 MILLIGRAM(S): at 12:43

## 2024-11-19 RX ADMIN — CLONIDINE HYDROCHLORIDE 0.1 MILLIGRAM(S): 0.3 TABLET ORAL at 21:28

## 2024-11-19 RX ADMIN — CLONIDINE HYDROCHLORIDE 0.1 MILLIGRAM(S): 0.3 TABLET ORAL at 06:08

## 2024-11-19 RX ADMIN — FUROSEMIDE 40 MILLIGRAM(S): 40 TABLET ORAL at 06:08

## 2024-11-19 NOTE — CHART NOTE - NSCHARTNOTEFT_GEN_A_CORE
Nutrition Follow Up Note  Hospital Course   (Per Electronic Medical Record)    Source:  Patient [X]  Medical Record [X]      Diet:   Low Sodium Diet w/ Thin Liquids (IDDSI Level 0)  Tolerates Diet Consistency Well  No Chewing/Swallowing Difficulties  No Recent Nausea, Vomiting, Diarrhea or Constipation (as Per Patient)  Consumes % of Meals (as Per Documentation) - States Good PO Intake/Appetite (Per Patient)  Nutrition Education Provided on Low Sodium Diet     Enteral/Parenteral Nutrition: Not Applicable    Current Weight: 211.4lb on 11/15  Obtain New Weight  Obtain Weights Weekly     Pertinent Medications: MEDICATIONS  (STANDING):  aspirin  chewable 81 milliGRAM(s) Oral daily  cloNIDine 0.1 milliGRAM(s) Oral three times a day  enoxaparin Injectable 40 milliGRAM(s) SubCutaneous every 24 hours  furosemide    Tablet 40 milliGRAM(s) Oral daily  influenza   Vaccine 0.5 milliLiter(s) IntraMuscular once  levETIRAcetam 1000 milliGRAM(s) Oral two times a day  metoprolol tartrate 50 milliGRAM(s) Oral two times a day  pantoprazole    Tablet 40 milliGRAM(s) Oral before breakfast  polyethylene glycol 3350 17 Gram(s) Oral daily  potassium chloride    Tablet ER 20 milliEquivalent(s) Oral daily  senna 2 Tablet(s) Oral at bedtime    MEDICATIONS  (PRN):  melatonin 6 milliGRAM(s) Oral at bedtime PRN Insomnia  methocarbamol 500 milliGRAM(s) Oral every 8 hours PRN Muscle Spasm    Pertinent Labs:  11-18 Na141 mmol/L Glu 115 mg/dL[H] K+ 4.5 mmol/L Cr  1.20 mg/dL BUN 14 mg/dL 11-18 Alb 2.6 g/dL[L]    Skin: No Pressure Ulcers  Multiple Surgical Incisions  (as Per Nursing Flow Sheet)     Edema: None Noted (as Per Documentation)     Last Bowel Movement: on 11/19    Estimated Needs:   [X] No Change Since Previous Assessment    Previous Nutrition Diagnosis:   Obese    Nutrition Diagnosis is [X] Ongoing - Nutrition Education Provided     New Nutrition Diagnosis: [X] Not Applicable    Interventions:   1. Nutrition Education Provided on Low Sodium Diet   2. Recommend Continue Nutrition Plan of Care     Monitoring & Evaluation:   [X] Weights   [X] PO Intake   [X] Skin Integrity   [X] Follow Up (Per Protocol)  [X] Tolerance to Diet Prescription   [X] Other: Labs     Registered Dietitian/Nutritionist Remains Available.  Reji José, MIKEN, CDN    Phone# [phone]

## 2024-11-19 NOTE — PROGRESS NOTE ADULT - SUBJECTIVE AND OBJECTIVE BOX
HPI:  Mr. Brennan Schneider is a 39-year-old male patient with no previous past medical history who originally presented to [facility] on 10/29/24 with c/o chest tightness and pain.  Pt had CTA chest significant for Aortic dissection which was noted from the level of the root of the aorta extending into the ascending, aortic arch, descending thoracic aorta as well as extending into the abdominal aorta and was noted to end in the distal left common iliac artery. The dissection flap was in close proximity to the right coronary artery. The left renal artery was arising from the false lumen. He was subsequently transferred to [facility] direct to OR for bio Bentall procedure with reconstruction of the RCA with Dr. Grimm on 10/29/24. Post op course c/b HTN and seizure (treated with IV ativan, propofol, Keppra loaded). CT head/neck no acute findings. Neurology consulted for post op twitching of extremities and left sided facial/arm weakness, dysarthria and left visual neglect. S/p cEEG without acute events, suspicious for right hemisphere structural lesion. Repeat CT Head r/o suspected right frontoparietal stroke, no acute findings (will need f/u MRI 6 weeks postop). Residual left-sided weakness/neglect improving. Patient is optimized and was evaluated by PM&R and therapy for functional deficits, gait/ADL impairments and acute rehabilitation was recommended. Patient was cleared for discharge to [facility] IRF on 11/11/24.       NAD overnight, afebrile, some dry cough, using IS. Incisions healing well-GAURAV.   VSS  No Sz reported, denies SOB, NV, or HA. Alert and awake.   US abd for elevated LFTs -no acute findings. Hold Tylenol. Labs following, asymptomatic. No NV.   A&Ox4, but aware of slow processing.   To therapy. DC planning.      MEDICATIONS  (STANDING):  aspirin  chewable 81 milliGRAM(s) Oral daily  cloNIDine 0.1 milliGRAM(s) Oral three times a day  enoxaparin Injectable 40 milliGRAM(s) SubCutaneous every 24 hours  furosemide    Tablet 40 milliGRAM(s) Oral daily  influenza   Vaccine 0.5 milliLiter(s) IntraMuscular once  levETIRAcetam 1000 milliGRAM(s) Oral two times a day  metoprolol tartrate 50 milliGRAM(s) Oral two times a day  pantoprazole    Tablet 40 milliGRAM(s) Oral before breakfast  polyethylene glycol 3350 17 Gram(s) Oral daily  potassium chloride    Tablet ER 20 milliEquivalent(s) Oral daily  senna 2 Tablet(s) Oral at bedtime    MEDICATIONS  (PRN):  melatonin 6 milliGRAM(s) Oral at bedtime PRN Insomnia  methocarbamol 500 milliGRAM(s) Oral every 8 hours PRN Muscle Spasm                            11.0   12.09 )-----------( 576      ( 18 Nov 2024 06:23 )             34.0     11-18    141  |  104  |  14  ----------------------------<  115[H]  4.5   |  29  |  1.20    Ca    9.1      18 Nov 2024 06:23    TPro  7.6  /  Alb  2.6[L]  /  TBili  0.5  /  DBili  x   /  AST  57[H]  /  ALT  210[H]  /  AlkPhos  239[H]  11-18    Urinalysis Basic - ( 18 Nov 2024 06:23 )    Color: x / Appearance: x / SG: x / pH: x  Gluc: 115 mg/dL / Ketone: x  / Bili: x / Urobili: x   Blood: x / Protein: x / Nitrite: x   Leuk Esterase: x / RBC: x / WBC x   Sq Epi: x / Non Sq Epi: x / Bacteria: x      Vital Signs Last 24 Hrs  T(C): 37.1 (18 Nov 2024 19:55), Max: 37.1 (18 Nov 2024 19:55)  T(F): 98.8 (18 Nov 2024 19:55), Max: 98.8 (18 Nov 2024 19:55)  HR: 67 (19 Nov 2024 06:04) (67 - 69)  BP: 122/79 (19 Nov 2024 06:04) (116/64 - 127/71)  BP(mean): --  RR: 16 (18 Nov 2024 19:55) (16 - 16)  SpO2: 95% (18 Nov 2024 19:55) (94% - 95%)    Parameters below as of 18 Nov 2024 19:55  Patient On (Oxygen Delivery Method): room air      Gen - NAD, Comfortable  HEENT - NCAT, EOMI, MMM, PERRLA, Normal Conjunctivae  Neck - Supple, No limited ROM  Pulm - CTAB, No wheeze, No rhonchi, No crackles  Cardiovascular - RRR, S1S2, No murmurs  Chest - good chest expansion, good respiratory effort  Abdomen - Soft, NT, +BS, Abd rounded   Extremities - No C/C, no calf tenderness  Neuro-     Cognitive - awake, alert, oriented to person, place, date, year, and situation.  Able  to follow commands     Communication - Fluent, Comprehensible, No dysarthria, No aphasia      Attention: Impaired w/delayed procressing     Cranial Nerves -No facial asymmetry, Tongue midline, EOMI, Shoulder shrug intact     Motor -                    LEFT    UE - ShAB 5/5, EF 5/5, EE 5/5,  5/5                    RIGHT UE - ShAB 5/5, EF 5/5, EE 5/5,   5/5                    LEFT    LE - HF 5/5, KE 5/5, DF 5/5, PF 5/5                    RIGHT LE - HF 5/5, KE 5/5, DF 5/5, PF 5/5        Sensory - Intact  to LT      Tone - Normal  Psychiatric - Mood stable, Affect flat  Skin:  R anterior cervical neck wound 1x1cm, 25cm mid sternal incision , well approximated, with steri strips, 2 lap sitex under 1.5 cm each, R anterior shoulder incision 7cm , moisture associated dermatitis groin      Continue comprehensive acute rehab program consisting of 3hrs/day of OT/PT and SLP.

## 2024-11-20 VITALS
HEART RATE: 68 BPM | DIASTOLIC BLOOD PRESSURE: 74 MMHG | OXYGEN SATURATION: 95 % | SYSTOLIC BLOOD PRESSURE: 133 MMHG | TEMPERATURE: 99 F | RESPIRATION RATE: 16 BRPM

## 2024-11-20 PROCEDURE — 80074 ACUTE HEPATITIS PANEL: CPT

## 2024-11-20 PROCEDURE — 97110 THERAPEUTIC EXERCISES: CPT | Mod: GP

## 2024-11-20 PROCEDURE — 76705 ECHO EXAM OF ABDOMEN: CPT

## 2024-11-20 PROCEDURE — 97165 OT EVAL LOW COMPLEX 30 MIN: CPT | Mod: GO

## 2024-11-20 PROCEDURE — 92523 SPEECH SOUND LANG COMPREHEN: CPT | Mod: GN

## 2024-11-20 PROCEDURE — 99232 SBSQ HOSP IP/OBS MODERATE 35: CPT

## 2024-11-20 PROCEDURE — 87635 SARS-COV-2 COVID-19 AMP PRB: CPT

## 2024-11-20 PROCEDURE — 85025 COMPLETE CBC W/AUTO DIFF WBC: CPT

## 2024-11-20 PROCEDURE — 97535 SELF CARE MNGMENT TRAINING: CPT | Mod: GO

## 2024-11-20 PROCEDURE — 97530 THERAPEUTIC ACTIVITIES: CPT | Mod: GP

## 2024-11-20 PROCEDURE — 97161 PT EVAL LOW COMPLEX 20 MIN: CPT | Mod: GP

## 2024-11-20 PROCEDURE — 97112 NEUROMUSCULAR REEDUCATION: CPT | Mod: GP

## 2024-11-20 PROCEDURE — 92507 TX SP LANG VOICE COMM INDIV: CPT | Mod: GN

## 2024-11-20 PROCEDURE — 97116 GAIT TRAINING THERAPY: CPT | Mod: GP

## 2024-11-20 PROCEDURE — 92610 EVALUATE SWALLOWING FUNCTION: CPT | Mod: GN

## 2024-11-20 PROCEDURE — 80053 COMPREHEN METABOLIC PANEL: CPT

## 2024-11-20 PROCEDURE — 99239 HOSP IP/OBS DSCHRG MGMT >30: CPT | Mod: GC

## 2024-11-20 PROCEDURE — 36415 COLL VENOUS BLD VENIPUNCTURE: CPT

## 2024-11-20 RX ADMIN — LEVETIRACETAM 1000 MILLIGRAM(S): 1000 TABLET ORAL at 06:04

## 2024-11-20 RX ADMIN — PANTOPRAZOLE SODIUM 40 MILLIGRAM(S): 40 TABLET, DELAYED RELEASE ORAL at 06:05

## 2024-11-20 RX ADMIN — Medication 81 MILLIGRAM(S): at 11:10

## 2024-11-20 RX ADMIN — METOPROLOL TARTRATE 50 MILLIGRAM(S): 100 TABLET, FILM COATED ORAL at 06:04

## 2024-11-20 RX ADMIN — POTASSIUM CHLORIDE 20 MILLIEQUIVALENT(S): 600 TABLET, EXTENDED RELEASE ORAL at 11:09

## 2024-11-20 RX ADMIN — FUROSEMIDE 40 MILLIGRAM(S): 40 TABLET ORAL at 06:04

## 2024-11-20 RX ADMIN — CLONIDINE HYDROCHLORIDE 0.1 MILLIGRAM(S): 0.3 TABLET ORAL at 06:04

## 2024-11-20 NOTE — PROGRESS NOTE ADULT - PROVIDER SPECIALTY LIST ADULT
Hospitalist
Hospitalist
Physiatry
Hospitalist
Physiatry
Hospitalist

## 2024-11-20 NOTE — PROGRESS NOTE ADULT - SUBJECTIVE AND OBJECTIVE BOX
HPI:  Mr. Brennan Schneider is a 39-year-old male patient with no previous past medical history who originally presented to [facility] on 10/29/24 with c/o chest tightness and pain.  Pt had CTA chest significant for Aortic dissection which was noted from the level of the root of the aorta extending into the ascending, aortic arch, descending thoracic aorta as well as extending into the abdominal aorta and was noted to end in the distal left common iliac artery. The dissection flap was in close proximity to the right coronary artery. The left renal artery was arising from the false lumen. He was subsequently transferred to [facility] direct to OR for bio Bentall procedure with reconstruction of the RCA with Dr. Grimm on 10/29/24. Post op course c/b HTN and seizure (treated with IV ativan, propofol, Keppra loaded). CT head/neck no acute findings. Neurology consulted for post op twitching of extremities and left sided facial/arm weakness, dysarthria and left visual neglect. S/p cEEG without acute events, suspicious for right hemisphere structural lesion. Repeat CT Head r/o suspected right frontoparietal stroke, no acute findings (will need f/u MRI 6 weeks postop). Residual left-sided weakness/neglect improving. Patient is optimized and was evaluated by PM&R and therapy for functional deficits, gait/ADL impairments and acute rehabilitation was recommended. Patient was cleared for discharge to [facility] on 11/11/24. (11 Nov 2024 13:13)      NAD overnight, afebrile, using IS. Incisions healing well-GAURAV.   VSS  No Sz reported, denies SOB, NV, or HA. Alert and awake.   US abd for elevated LFTs -no acute findings. Hold Tylenol. Labs following, asymptomatic. No NV.   A&Ox4, but aware of slow processing.   DC home today      MEDICATIONS  (STANDING):  aspirin  chewable 81 milliGRAM(s) Oral daily  cloNIDine 0.1 milliGRAM(s) Oral three times a day  enoxaparin Injectable 40 milliGRAM(s) SubCutaneous every 24 hours  furosemide    Tablet 40 milliGRAM(s) Oral daily  influenza   Vaccine 0.5 milliLiter(s) IntraMuscular once  levETIRAcetam 1000 milliGRAM(s) Oral two times a day  metoprolol tartrate 50 milliGRAM(s) Oral two times a day  pantoprazole    Tablet 40 milliGRAM(s) Oral before breakfast  polyethylene glycol 3350 17 Gram(s) Oral daily  potassium chloride    Tablet ER 20 milliEquivalent(s) Oral daily  senna 2 Tablet(s) Oral at bedtime    MEDICATIONS  (PRN):  melatonin 6 milliGRAM(s) Oral at bedtime PRN Insomnia  methocarbamol 500 milliGRAM(s) Oral every 8 hours PRN Muscle Spasm      Vital Signs Last 24 Hrs  T(C): 37.1 (20 Nov 2024 07:34), Max: 37.2 (19 Nov 2024 20:10)  T(F): 98.8 (20 Nov 2024 07:34), Max: 98.9 (19 Nov 2024 20:10)  HR: 68 (20 Nov 2024 07:34) (68 - 77)  BP: 133/74 (20 Nov 2024 07:34) (103/59 - 136/70)  BP(mean): --  RR: 16 (20 Nov 2024 07:34) (16 - 16)  SpO2: 95% (20 Nov 2024 07:34) (92% - 95%)    Parameters below as of 20 Nov 2024 07:34  Patient On (Oxygen Delivery Method): room air      Gen - NAD, Comfortable  HEENT - NCAT, EOMI, MMM, PERRLA, Normal Conjunctivae  Neck - Supple, No limited ROM  Pulm - CTAB, No wheeze, No rhonchi, No crackles  Cardiovascular - RRR, S1S2, No murmurs  Chest - good chest expansion, good respiratory effort  Abdomen - Soft, NT, +BS, Abd rounded   Extremities - No C/C, no calf tenderness  Neuro-     Cognitive - awake, alert, oriented to person, place, date, year, and situation.  Able  to follow commands     Communication - Fluent, Comprehensible, No dysarthria, No aphasia      Attention: Impaired w/delayed procressing     Cranial Nerves -No facial asymmetry, Tongue midline, EOMI, Shoulder shrug intact     Motor -                    LEFT    UE - ShAB 5/5, EF 5/5, EE 5/5,  5/5                    RIGHT UE - ShAB 5/5, EF 5/5, EE 5/5,   5/5                    LEFT    LE - HF 5/5, KE 5/5, DF 5/5, PF 5/5                    RIGHT LE - HF 5/5, KE 5/5, DF 5/5, PF 5/5        Sensory - Intact  to LT      Tone - Normal  Psychiatric - Mood stable, Affect flat  Skin:  all incisions healing well, GAURAV      Completed comprehensive acute rehab program consisting of 3hrs/day of OT/PT and SLP. DC home today.

## 2024-11-20 NOTE — PROGRESS NOTE ADULT - SUBJECTIVE AND OBJECTIVE BOX
Interval History  Patient seen and examined at bedside. No acute events noted.  Patient reports feeling well, denies any acute complaints.     ALLERGIES:  Allergy Status Unknown  No Known Allergies    MEDICATIONS  (STANDING):  aspirin  chewable 81 milliGRAM(s) Oral daily  cloNIDine 0.1 milliGRAM(s) Oral three times a day  enoxaparin Injectable 40 milliGRAM(s) SubCutaneous every 24 hours  furosemide    Tablet 40 milliGRAM(s) Oral daily  influenza   Vaccine 0.5 milliLiter(s) IntraMuscular once  levETIRAcetam 1000 milliGRAM(s) Oral two times a day  metoprolol tartrate 50 milliGRAM(s) Oral two times a day  pantoprazole    Tablet 40 milliGRAM(s) Oral before breakfast  polyethylene glycol 3350 17 Gram(s) Oral daily  potassium chloride    Tablet ER 20 milliEquivalent(s) Oral daily  senna 2 Tablet(s) Oral at bedtime    MEDICATIONS  (PRN):  melatonin 6 milliGRAM(s) Oral at bedtime PRN Insomnia  methocarbamol 500 milliGRAM(s) Oral every 8 hours PRN Muscle Spasm    Vital Signs Last 24 Hrs  T(F): 98.8 (20 Nov 2024 07:34), Max: 98.9 (19 Nov 2024 20:10)  HR: 68 (20 Nov 2024 07:34) (68 - 77)  BP: 133/74 (20 Nov 2024 07:34) (104/61 - 136/70)  RR: 16 (20 Nov 2024 07:34) (16 - 16)  SpO2: 95% (20 Nov 2024 07:34) (92% - 95%)  I&O's Summary    PHYSICAL EXAM:  GENERAL: NAD  HEENT: NCAT  CHEST/LUNG: Clear to percussion bilaterally; No rales, rhonchi, wheezing  HEART: Regular rate and rhythm; No murmurs, median sternotomy incision healing well   ABDOMEN: Soft, Nontender, Nondistended; Bowel sounds present  MUSCULOSKELETAL/EXTREMITIES:  2+ Peripheral Pulses, No LE edema  PSYCH: Appropriate affect  NEURO: Alert & Oriented x 3, no facial asymmetry, 5/5 UE/LE     I personally reviewed the below data/images/labs:    LABS:                        11.0   12.09 )-----------( 576      ( 18 Nov 2024 06:23 )             34.0       11-18    141  |  104  |  14  ----------------------------<  115  4.5   |  29  |  1.20    Ca    9.1      18 Nov 2024 06:23    TPro  7.6  /  Alb  2.6  /  TBili  0.5  /  DBili  x   /  AST  57  /  ALT  210  /  AlkPhos  239  11-18    Urinalysis Basic - ( 18 Nov 2024 06:23 )    Color: x / Appearance: x / SG: x / pH: x  Gluc: 115 mg/dL / Ketone: x  / Bili: x / Urobili: x   Blood: x / Protein: x / Nitrite: x   Leuk Esterase: x / RBC: x / WBC x   Sq Epi: x / Non Sq Epi: x / Bacteria: x    COVID-19 PCR: Laura (11-11-24 @ 22:41)    Consultant(s) Notes Reviewed:   Care Discused with Consultants/Other Providers:  Imaging Personally Reviewed:

## 2024-11-20 NOTE — PROGRESS NOTE ADULT - ASSESSMENT
39 year old male with no pertinent PMHx originally presents to [facility] on 10/29/24 with c/o chest tightness and pain. Found to have aortic dissection beginning at the root of the aorta extending into the ascending, aortic arch, descending thoracic aorta as well as the abdominal aorta and ends in the distal left common iliac artery. The dissection flap is in close proximity to the right coronary artery. The left renal artery is arising from the false lumen., transferred to [facility] for Bio-bentall  with reconstruction of the RCA with Dr. Grimm on 10/29. Post op course c/b seizure and suspected CVA.  Now admitted to [facility] with left sided decificts  for initiation of a multidisciplinary rehab program consisting focused on functional mobility, transfers and ADLs.    #Aortic Dissection s/p BioBentall Procedure and Reconstruction of RCA 10/29  -WBAT, Sternal Precautions   -Continue aspirin  -Continue Lasix   -Continue metoprolol   -Pain control and bowel regimen per rehab team  -Comprehensive rehab program with PT/OT   -Outpatient follow up with CTS for further workup     #Suspected CVA  #Left Hemiparesis (Improved)  -Post-op left sided facial/arm weakness, dysarthria and left visual neglect  -CTH 10/31 showed no acute intracranial pathology. CTA Head and neck showed no flow limiting stenosis or vascular aneurysm or AVM. No venous venous thrombosis.   -Repeat CTH 11/6 showed no acute intracranial pathology  -Seen by neurology during his hospitalization, right frontoparietal stroke suspected, recommended MRI once safe from CTS perspective (to be done in 6 weeks given pacing wires).   -Fall precaution  -Comprehensive rehab program with PT/OT/SLP  -Outpatient follow up with neurology     #Post-Op Seizure  -s/p cEEG structural abnormal, but no events  -Outpatient MRI brain as above  -Continue keppra  -Seizure precaution  -Outpatient neurology for MRI as above     #Post Fever w/ Leukocytosis  -Infectious workup: UA negative (11/5), CXR negative (11/11), COVID negative, blood cultures 11/4 and 11/7 negative  -Has been afebrile here  -Leukocytosis has downtrended and stable, possibly reactive  -Continue to monitor CBC and for recurrent fever    #Elevated lft  -reviewed US findings  -Limit tylenol and robaxin use  -avoid hepatoxic meds  -f/u hep panel in AM    #HTN  -Continue clonidine and metoprolol   -Monitor BP     #Post-Op Anemia  -H/H stable  -Monitor CBC    #GI PPx  -PPI    #DVT PPx  -Lovenox SC     
39 year old male with no pertinent PMHx originally presents to [facility] on 10/29/24 with c/o chest tightness and pain. Found to have aortic dissection beginning at the root of the aorta extending into the ascending, aortic arch, descending thoracic aorta as well as the abdominal aorta and ends in the distal left common iliac artery. The dissection flap is in close proximity to the right coronary artery. The left renal artery is arising from the false lumen., transferred to [facility] for Bio-bentall  with reconstruction of the RCA with Dr. Grimm on 10/29. Post op course c/b seizure and suspected CVA.  Now admitted to [facility] with left sided decificts  for initiation of a multidisciplinary rehab program consisting focused on functional mobility, transfers and ADLs.    #Aortic Dissection s/p BioBentall Procedure and Reconstruction of RCA 10/29  -WBAT, Sternal Precautions   -Continue aspirin  -Continue Lasix   -Continue metoprolol   -Pain control and bowel regimen per rehab team  -Comprehensive rehab program with PT/OT   -Outpatient follow up with CTS for further workup     #Suspected CVA  #Left Hemiparesis (Improved)  -Post-op left sided facial/arm weakness, dysarthria and left visual neglect  -CTH 10/31 showed no acute intracranial pathology. CTA Head and neck showed no flow limiting stenosis or vascular aneurysm or AVM. No venous venous thrombosis.   -Repeat CTH 11/6 showed no acute intracranial pathology  -Seen by neurology during his hospitalization, right frontoparietal stroke suspected, recommended MRI once safe from CTS perspective (to be done in 6 weeks given pacing wires).   -Fall precaution  -Comprehensive rehab program with PT/OT/SLP  -Outpatient follow up with neurology     #Post-Op Seizure  -s/p cEEG structural abnormal, but no events  -Outpatient MRI brain as above  -Continue keppra  -Seizure precaution  -Outpatient neurology for MRI as above     #Post Fever w/ Leukocytosis  -Infectious workup: UA negative (11/5), CXR negative (11/11), COVID negative, blood cultures 11/4 and 11/7 negative  -Has been afebrile here  -Leukocytosis has downtrended and stable, possibly reactive  -Continue to monitor CBC and for recurrent fever    #Elevated lft  -reviewed US findings  -hep panel negative  -Limit tylenol and robaxin use  -avoid hepatoxic meds    #HTN  -Continue clonidine and metoprolol   -Monitor BP     #Post-Op Anemia  -H/H stable  -Monitor CBC    #GI PPx  -PPI    #DVT PPx  -Lovenox SC     
39 year old male with no pertinent PMHx originally presents to [facility] on 10/29/24 with c/o chest tightness and pain. Found to have aortic dissection beginning at the root of the aorta extending into the ascending, aortic arch, descending thoracic aorta as well as the abdominal aorta and ends in the distal left common iliac artery. The dissection flap is in close proximity to the right coronary artery. The left renal artery is arising from the false lumen., transferred to [facility] for Bio-bentall  with reconstruction of the RCA with Dr. Grimm on 10/29. Post op course c/b seizure and suspected CVA.  Now admitted to [facility] with left sided decificts  for initiation of a multidisciplinary rehab program consisting focused on functional mobility, transfers and ADLs.    #Aortic Dissection s/p BioBentall Procedure and Reconstruction of RCA 10/29  -WBAT, Sternal Precautions   -Continue aspirin  -Continue Lasix   -Continue metoprolol   -Pain control and bowel regimen per rehab team  -Comprehensive rehab program with PT/OT   -Outpatient follow up with CTS for further workup     #Suspected CVA  #Left Hemiparesis (Improved)  -Post-op left sided facial/arm weakness, dysarthria and left visual neglect  -CTH 10/31 showed no acute intracranial pathology. CTA Head and neck showed no flow limiting stenosis or vascular aneurysm or AVM. No venous venous thrombosis.   -Repeat CTH 11/6 showed no acute intracranial pathology  -Seen by neurology during his hospitalization, right frontoparietal stroke suspected, recommended MRI once safe from CTS perspective (to be done in 6 weeks given pacing wires).   -Fall precaution  -Comprehensive rehab program with PT/OT/SLP  -Outpatient follow up with neurology     #Post-Op Seizure  -s/p cEEG structural abnormal, but no events  -Continue keppra  -Seizure precaution  -Outpatient neurology for MRI as above     #Post Fever w/ Leukocytosis  -Infectious workup: UA negative (11/5), CXR negative (11/11), COVID negative, blood cultures 11/4 and 11/7 negative  -Has been afebrile here  -Leukocytosis continues to downtrend, possibly reactive  -Continue to monitor CBC w/ PMD    #Transaminitis (Downtrending)  -RUQ u/s Minimal/equivocally increased hepatic echotexture without focal   abnormality, raises the possibility of steatosis  -Acute hepatitis panel negative  -Limit tylenol and robaxin use  -Avoid hepatoxic meds  -Monitor CMP with PMD, patient is aware, has follow up appt with PMD this Friday     #HTN  -Continue clonidine and metoprolol   -Continue lasix   -Monitor BP, overall controlled     #Post-Op Anemia  -H/H stable  -Monitor CBC w/ PMD    #GI PPx  -PPI    #DVT PPx  -Lovenox SC     Discussed with rehab team   
Assessment/Plan:  Mr. Brennan Schneider is a 39-year-old male patient with no previous past medical history who is admitted for Acute Inpatient Rehabilitation with a multidisciplinary rehab program at [facility] with functional impairments in ADLs and mobility secondary to aortic dissection from the level of the root of the aorta extending into the ascending, aortic arch, descending thoracic aorta as well as extending into the abdominal aorta and was noted to end in the distal left common iliac artery S/P repair of type A aortic dissection by Bentall procedure with reconstruction of right coronary artery, dissection of aortic root, and aortic valve conduit by Dr. Grimm (10/29/24) with post-operative course remarkable for clinical suggestion of stroke with no radiographic confirmation.    #Dissection of ascending aorta and aortic arch s/p surgical repair  - CTA chest significant for Aortic dissection (HNT)      * Emergent transfer to [facility] OR for BioBental reconstruction RCA on 10/29 with Dr. Grimm   - S/P repair of type A aortic dissection by Bentall procedure with reconstruction of right coronary artery, dissection of aortic root, and aortic valve conduit by Dr. Grimm (10/29/24)      * Sternal precautions; WBAT       * Daily weights  - Prior EKG- NSR with PACs w/ LBB (11/5)       * Lasix 40mg daily       * Aspirin 81mg daily       * Metoprolol 50mg BID       * Pain management: Robaxin to prn, Tylenol hold-elevated LFTs  - Clinical suspicion from Neurology of a right frontoparietal stroke      * Deficits: Left hemiparesis, moderate cog deficits, delayed processing,  dysarthric , left inattention   - Comprehensive Multidisciplinary Rehab Program:      * 3 hours a day, 5 days a week      * PT 1hr/day: Focused on improving strength, endurance, coordination, balance, functional mobility, and transfers      * OT 1hr/day: Focused on improving strength, fine motor skills, coordination, posture and ADLs.        * Speech Therapy 1hr/day: to diagnose and treat deficits in swallowing, cognition and communication.   - Activity Limitations: Decreased social, vocational and leisure activities, decreased self care and ADLs, decreased mobility, decreased ability to manage household and finances.     -----------------------------------------------------------------------------------  Concurrent Medical Problems    #Recurrent Fever with leukocytosis  - Negative BCx 11/4, 11/7  - UA neg  - Afebrile overnight  - Continue to monitor temp and trend CBC    #Seizure  -Post op isolated seizure  - S/p cEEG structural abnormal, but no events  - CT Head no acute findings, repeat CTH negative   - Recommend f/u outpatient MRI brain- 6w post op  - Keppra 1000mg BID  - Seizure precautions     #Post HTN  - Clonidine 0.1mg TID  - Metoprolol 50mg BID  - Monitor BP     #Mood/Cognition:  Neuropsychology consult   - SLP eval    #GI/Bowel:  Senna QHS, Miralax PRN Daily  GI ppx: Pantoprazole 40mg daily     #/Bladder:   - PVR x1 on admission  - SC if > 400    #Skin/Pressure Injury:   - Skin assessment on admission: R anterior cervical neck wound 1x1cm, 25cm mid sternal incision , well approximated, with steri strips, 2 lap site under 1.5 cm each, R anterior shoulder incision 7cm, moisture associated dermatitis groin   - Nystatin BID    #Diet  Current Diet: Regular; DASH  Nutrition consult     #Precautions / PROPHYLAXIS:   - Lungs: Aspiration, Incentive Spirometer, deep coughing/breathing exercise  - DVT ppx: Lovenox 40mg daily , SCDs  - Pressure injury/Skin: OOB to Chair, PT/OT      ---------------  Code Status: FULL  Emergency Contact:    Outpatient Follow-up (Specialty/Name of physician):    Lazaro Grimm  Thoracic and Cardiac Surgery  [address] [place] [address]  Phone: [phone]  Fax: [phone]  Follow Up Time:     Keven Leach  Neurology  [address], [address]  [place] [address]  Phone: [phone]  Fax: [phone]  Follow Up Time: 1 month  
Assessment/Plan:  Mr. Brennan Schneider is a 39-year-old male patient with no previous past medical history who is admitted for Acute Inpatient Rehabilitation with a multidisciplinary rehab program at [facility] with functional impairments in ADLs and mobility secondary to aortic dissection from the level of the root of the aorta extending into the ascending, aortic arch, descending thoracic aorta as well as extending into the abdominal aorta and was noted to end in the distal left common iliac artery S/P repair of type A aortic dissection by Bentall procedure with reconstruction of right coronary artery, dissection of aortic root, and aortic valve conduit by Dr. Grimm (10/29/24) with post-operative course remarkable for clinical suggestion of stroke with no radiographic confirmation.    #Dissection of ascending aorta and aortic arch s/p surgical repair  - CTA chest significant for Aortic dissection (HNT)      * Emergent transfer to [facility] OR for BioBental reconstruction RCA on 10/29 with Dr. Grimm   - S/P repair of type A aortic dissection by Bentall procedure with reconstruction of right coronary artery, dissection of aortic root, and aortic valve conduit by Dr. Grimm (10/29/24)      * Sternal precautions; WBAT       * Daily weights  - Prior EKG- NSR with PACs w/ LBB (11/5)       * Lasix 40mg daily       * Aspirin 81mg daily       * Metoprolol 50mg BID       * Pain management: Robaxin to prn, Tylenol hold-elevated LFTs  - Clinical suspicion from Neurology of a right frontoparietal stroke      * Deficits: Left hemiparesis, moderate cog deficits, delayed processing,  dysarthric , left inattention   - Comprehensive Multidisciplinary Rehab Program:      * 3 hours a day, 5 days a week      * PT 1hr/day: Focused on improving strength, endurance, coordination, balance, functional mobility, and transfers      * OT 1hr/day: Focused on improving strength, fine motor skills, coordination, posture and ADLs.        * Speech Therapy 1hr/day: to diagnose and treat deficits in swallowing, cognition and communication.   - Activity Limitations: Decreased social, vocational and leisure activities, decreased self care and ADLs, decreased mobility, decreased ability to manage household and finances.     -----------------------------------------------------------------------------------  Concurrent Medical Problems    #Recurrent Fever with leukocytosis  - Negative BCx 11/4, 11/7  - UA neg  - Afebrile overnight  - Continue to monitor temp and trend CBC    #Seizure  -Post op isolated seizure  - S/p cEEG structural abnormal, but no events  - CT Head no acute findings, repeat CTH negative   - Recommend f/u outpatient MRI brain- 6w post op  - Keppra 1000mg BID  - Seizure precautions     #Post HTN  - Clonidine 0.1mg TID  - Metoprolol 50mg BID  - Monitor BP     #Mood/Cognition:  Neuropsychology consult   - SLP eval    #GI/Bowel:  Senna QHS, Miralax PRN Daily  GI ppx: Pantoprazole 40mg daily     #/Bladder:   - PVR x1 on admission  - SC if > 400    #Skin/Pressure Injury:   - Skin assessment on admission: R anterior cervical neck wound 1x1cm, 25cm mid sternal incision , well approximated, with steri strips, 2 lap site under 1.5 cm each, R anterior shoulder incision 7cm, moisture associated dermatitis groin   - Nystatin BID    #Diet  Current Diet: Regular; DASH  Nutrition consult     #Precautions / PROPHYLAXIS:   - Lungs: Aspiration, Incentive Spirometer, deep coughing/breathing exercise  - DVT ppx: Lovenox 40mg daily , SCDs  - Pressure injury/Skin: OOB to Chair, PT/OT      ---------------  Code Status: FULL  Emergency Contact:    Outpatient Follow-up (Specialty/Name of physician):    Lazaro Grimm  Thoracic and Cardiac Surgery  [address] [place] [address]  Phone: [phone]  Fax: [phone]  Follow Up Time:     Keven Leach  Neurology  [address]  [place] [address]  Phone: [phone]  Fax: [phone]  Follow Up Time: 1 month  
Mr. Brennan Schneider is a 39-year-old male patient with no previous past medical history who is admitted for Acute Inpatient Rehabilitation with a multidisciplinary rehab program at [facility] with functional impairments in ADLs and mobility secondary to aortic dissection from the level of the root of the aorta extending into the ascending, aortic arch, descending thoracic aorta as well as extending into the abdominal aorta and was noted to end in the distal left common iliac artery S/P repair of type A aortic dissection by Bentall procedure with reconstruction of right coronary artery, dissection of aortic root, and aortic valve conduit by Dr. Grimm (10/29/24) with post-operative course remarkable for clinical suggestion of stroke with no radiographic confirmation.      #Dissection of ascending aorta and aortic arch s/p surgical repair  - CTA chest significant for Aortic dissection (HNT)      * Emergent transfer to [facility] OR for BioBental reconstruction RCA on 10/29 with Dr. Grimm   - S/P repair of type A aortic dissection by Bentall procedure with reconstruction of right coronary artery, dissection of aortic root, and aortic valve conduit by Dr. Grimm (10/29/24)      * Sternal precautions; WBAT       * Daily weights  - Prior EKG- NSR with PACs w/ LBB (11/5)       * Lasix 40mg daily       * Aspirin 81mg daily       * Metoprolol 50mg BID   - Tylenol hold-elevated LFTs  - Clinical suspicion from Neurology of a right frontoparietal stroke- outpt follow up.   - Comprehensive Multidisciplinary Rehab Program-completed    -----------------------------------------------------------------------------------  Concurrent Medical Problems    #Recurrent Fever with leukocytosis  - Negative BCx 11/4, 11/7  - UA neg  - Afebrile     #Seizure  -Post op isolated seizure  - S/p cEEG structural abnormal, but no events  - CT Head no acute findings, repeat CTH negative   - Recommend f/u outpatient MRI brain- 6w post op  - Keppra 1000mg BID  - Seizure precautions   - Neuro f/up outpt    #Post HTN  - Clonidine 0.1mg TID  - Metoprolol 50mg BID  - Monitor BP, CTS and PCP follow up in community     #Mood/Cognition:  Neuropsychology consult   - SLP appreciated    #GI/Bowel:  Senna QHS, Miralax PRN Daily  GI ppx: Pantoprazole 40mg daily     #/Bladder:   -voids freely    #Skin/Pressure Injury:   - all wounds healing well, PCP follow up    #Diet  Current Diet: Regular; DASH      ---------------  Code Status: FULL  Emergency Contact:    Outpatient Follow-up (Specialty/Name of physician):    Lazaro Grimm  Thoracic and Cardiac Surgery  [address] [place] [address]  Phone: [phone]  Fax: [phone]  Follow Up Time:     Keven Leach  Neurology  [address], [address]  [place] [address]  Phone: [phone]  Fax: [phone]  Follow Up Time: 1 month  
Assessment/Plan:  Mr. Brennan Schneider is a 39-year-old male patient with no previous past medical history who is admitted for Acute Inpatient Rehabilitation with a multidisciplinary rehab program at [facility] with functional impairments in ADLs and mobility secondary to aortic dissection from the level of the root of the aorta extending into the ascending, aortic arch, descending thoracic aorta as well as extending into the abdominal aorta and was noted to end in the distal left common iliac artery S/P repair of type A aortic dissection by Bentall procedure with reconstruction of right coronary artery, dissection of aortic root, and aortic valve conduit by Dr. Grimm (10/29/24) with post-operative course remarkable for clinical suggestion of stroke with no radiographic confirmation.    #Dissection of ascending aorta and aortic arch s/p surgical repair  - CTA chest significant for Aortic dissection (HNT)      * Emergent transfer to [facility] OR for BioBental reconstruction RCA on 10/29 with Dr. Grimm   - S/P repair of type A aortic dissection by Bentall procedure with reconstruction of right coronary artery, dissection of aortic root, and aortic valve conduit by Dr. Grimm (10/29/24)      * Sternal precautions; WBAT       * Daily weights  - Prior EKG- NSR with PACs w/ LBB (11/5)       * Lasix 40mg daily       * Aspirin 81mg daily       * Metoprolol 50mg BID       * Pain management: Robaxin to prn, Tylenol hold-elevated LFTs  - Clinical suspicion from Neurology of a right frontoparietal stroke      * Deficits: Left hemiparesis, moderate cog deficits, delayed processing,  dysarthric , left inattention   - Comprehensive Multidisciplinary Rehab Program:      * 3 hours a day, 5 days a week  - dc planning    -----------------------------------------------------------------------------------  Concurrent Medical Problems    #Recurrent Fever with leukocytosis  - Negative BCx 11/4, 11/7  - UA neg  - Afebrile   - Continue to monitor temp and trend CBC- leukocytosis improving-cont IS and OOB    #Seizure  -Post op isolated seizure  - S/p cEEG structural abnormal, but no events  - CT Head no acute findings, repeat CTH negative   - Recommend f/u outpatient MRI brain- 6w post op  - Keppra 1000mg BID  - Seizure precautions     #Post HTN  - Clonidine 0.1mg TID  - Metoprolol 50mg BID  - Monitor BP     #Mood/Cognition:  Neuropsychology consult   - SLP appreciated    #GI/Bowel:  Senna QHS, Miralax PRN Daily  GI ppx: Pantoprazole 40mg daily     #/Bladder:   - PVR x1 on admission  - SC if > 400    #Skin/Pressure Injury:   - Skin assessment on admission: R anterior cervical neck wound 1x1cm, 25cm mid sternal incision , well approximated, with steri strips, 2 lap site under 1.5 cm each, R anterior shoulder incision 7cm, moisture associated dermatitis groin   - Nystatin BID    #Diet  Current Diet: Regular; DASH  Nutrition consult     #Precautions / PROPHYLAXIS:   - Lungs: Aspiration, Incentive Spirometer, deep coughing/breathing exercise  - DVT ppx: Lovenox 40mg daily  - Pressure injury/Skin: OOB to Chair, PT/OT      ---------------  Code Status: FULL  Emergency Contact:    Outpatient Follow-up (Specialty/Name of physician):    Lazaro Grimm  Thoracic and Cardiac Surgery  [address] [place] [address]  Phone: [phone]  Fax: [phone]  Follow Up Time:     Keven Leach  Neurology  [address], [address]  [place] [address]  Phone: [phone]  Fax: [phone]  Follow Up Time: 1 month  
Mr. Brennan Schneider is a 39-year-old male patient with no previous past medical history who is admitted for Acute Inpatient Rehabilitation with a multidisciplinary rehab program at [facility] with functional impairments in ADLs and mobility secondary to aortic dissection from the level of the root of the aorta extending into the ascending, aortic arch, descending thoracic aorta as well as extending into the abdominal aorta and was noted to end in the distal left common iliac artery S/P repair of type A aortic dissection by Bentall procedure with reconstruction of right coronary artery, dissection of aortic root, and aortic valve conduit by Dr. Grimm (10/29/24) with post-operative course remarkable for clinical suggestion of stroke with no radiographic confirmation.    #Dissection of ascending aorta and aortic arch s/p surgical repair  - CTA chest significant for Aortic dissection (HNT)      * Emergent transfer to [facility] OR for BioBental reconstruction RCA on 10/29 with Dr. Grimm   - S/P repair of type A aortic dissection by Bentall procedure with reconstruction of right coronary artery, dissection of aortic root, and aortic valve conduit by Dr. Grimm (10/29/24)      * Sternal precautions; WBAT       * Daily weights  - Prior EKG- NSR with PACs w/ LBB (11/5)       * Lasix 40mg daily       * Aspirin 81mg daily       * Metoprolol 50mg BID       * Pain management: Robaxin to prn, Tylenol hold-elevated LFTs  - Clinical suspicion from Neurology of a right frontoparietal stroke      * Deficits: Left hemiparesis, moderate cog deficits, delayed processing,  dysarthric , left inattention   - Comprehensive Multidisciplinary Rehab Program:      * 3 hours a day, 5 days a week  - dc planning    -----------------------------------------------------------------------------------  Concurrent Medical Problems    #Recurrent Fever with leukocytosis  - Negative BCx 11/4, 11/7  - UA neg  - Afebrile   - Continue to monitor temp and trend CBC- leukocytosis improving-cont IS and OOB    #Seizure  -Post op isolated seizure  - S/p cEEG structural abnormal, but no events  - CT Head no acute findings, repeat CTH negative   - Recommend f/u outpatient MRI brain- 6w post op  - Keppra 1000mg BID  - Seizure precautions     #Post HTN  - Clonidine 0.1mg TID  - Metoprolol 50mg BID  - Monitor BP     #Mood/Cognition:  Neuropsychology consult   - SLP appreciated    #GI/Bowel:  Senna QHS, Miralax PRN Daily  GI ppx: Pantoprazole 40mg daily     #/Bladder:   - PVR x1 on admission  - SC if > 400    #Skin/Pressure Injury:   - Skin assessment on admission: R anterior cervical neck wound 1x1cm, 25cm mid sternal incision , well approximated, with steri strips, 2 lap site under 1.5 cm each, R anterior shoulder incision 7cm, moisture associated dermatitis groin   - Nystatin BID    #Diet  Current Diet: Regular; DASH  Nutrition consult     #Precautions / PROPHYLAXIS:   - Lungs: Aspiration, Incentive Spirometer, deep coughing/breathing exercise  - DVT ppx: Lovenox 40mg daily  - Pressure injury/Skin: OOB to Chair, PT/OT      ---------------  Code Status: FULL  Emergency Contact:    Outpatient Follow-up (Specialty/Name of physician):    Lazaro Grimm  Thoracic and Cardiac Surgery  [address] [place] [address]  Phone: [phone]  Fax: [phone]  Follow Up Time:     Keven Leach  Neurology  [address], [address]  [place] [address]  Phone: [phone]  Fax: [phone]  Follow Up Time: 1 month  
39 year old male with no pertinent PMHx originally presents to [facility] on 10/29/24 with c/o chest tightness and pain. Found to have aortic dissection beginning at the root of the aorta extending into the ascending, aortic arch, descending thoracic aorta as well as the abdominal aorta and ends in the distal left common iliac artery. The dissection flap is in close proximity to the right coronary artery. The left renal artery is arising from the false lumen., transferred to [facility] for Bio-bentall  with reconstruction of the RCA with Dr. Grimm on 10/29. Post op course c/b seizure and suspected CVA.  Now admitted to [facility] with left sided decificts  for initiation of a multidisciplinary rehab program consisting focused on functional mobility, transfers and ADLs.    #Aortic Dissection s/p BioBentall Procedure and Reconstruction of RCA 10/29  -WBAT, Sternal Precautions   -Continue aspirin  -Continue Lasix   -Continue metoprolol   -Pain control and bowel regimen per rehab team  -Comprehensive rehab program with PT/OT   -Outpatient follow up with CTS for further workup     #Suspected CVA  #Left Hemiparesis (Improved)  -Post-op left sided facial/arm weakness, dysarthria and left visual neglect  -CTH 10/31 showed no acute intracranial pathology. CTA Head and neck showed no flow limiting stenosis or vascular aneurysm or AVM. No venous venous thrombosis.   -Repeat CTH 11/6 showed no acute intracranial pathology  -Seen by neurology during his hospitalization, right frontoparietal stroke suspected, recommended MRI once safe from CTS perspective (to be done in 6 weeks given pacing wires).   -Fall precaution  -Comprehensive rehab program with PT/OT/SLP  -Outpatient follow up with neurology     #Post-Op Seizure  -s/p cEEG structural abnormal, but no events  -Outpatient MRI brain as above  -Continue keppra  -Seizure precaution  -Outpatient neurology for MRI as above     #Post Fever w/ Leukocytosis  -Infectious workup: UA negative (11/5), CXR negative (11/11), COVID negative, blood cultures 11/4 and 11/7 negative  -Has been afebrile here  -Leukocytosis has downtrended and stable, possibly reactive  -Continue to monitor CBC and for recurrent fever    #Elevated lft  -reviewed US findings  -hep panel negative  -Limit tylenol and robaxin use  -avoid hepatoxic meds    #HTN  -Continue clonidine and metoprolol   -Monitor BP     #Post-Op Anemia  -H/H stable  -Monitor CBC    #GI PPx  -PPI    #DVT PPx  -Lovenox SC     
39 year old male with no pertinent PMHx originally presents to [facility] on 10/29/24 with c/o chest tightness and pain. Found to have aortic dissection beginning at the root of the aorta extending into the ascending, aortic arch, descending thoracic aorta as well as the abdominal aorta and ends in the distal left common iliac artery. The dissection flap is in close proximity to the right coronary artery. The left renal artery is arising from the false lumen., transferred to [facility] for Bio-bentall  with reconstruction of the RCA with Dr. Grimm on 10/29. Post op course c/b seizure and suspected CVA.  Now admitted to [facility] with left sided decificts  for initiation of a multidisciplinary rehab program consisting focused on functional mobility, transfers and ADLs.    #Aortic Dissection s/p BioBentall Procedure and Reconstruction of RCA 10/29  -WBAT, Sternal Precautions   -Continue aspirin  -Continue Lasix   -Continue metoprolol   -Pain control and bowel regimen per rehab team  -Comprehensive rehab program with PT/OT   -Outpatient follow up with CTS for further workup     #Suspected CVA  #Left Hemiparesis (Improved)  -Post-op left sided facial/arm weakness, dysarthria and left visual neglect  -CTH 10/31 showed no acute intracranial pathology. CTA Head and neck showed no flow limiting stenosis or vascular aneurysm or AVM. No venous venous thrombosis.   -Repeat CTH 11/6 showed no acute intracranial pathology  -Seen by neurology during his hospitalization, right frontoparietal stroke suspected, recommended MRI once safe from CTS perspective (to be done in 6 weeks given pacing wires).   -Fall precaution  -Comprehensive rehab program with PT/OT/SLP  -Outpatient follow up with neurology     #Post-Op Seizure  -s/p cEEG structural abnormal, but no events  -Outpatient MRI brain as above  -Continue keppra  -Seizure precaution  -Outpatient neurology for MRI as above     #Post Fever w/ Leukocytosis  -Infectious workup: UA negative (11/5), CXR negative (11/11), COVID negative, blood cultures 11/4 and 11/7 negative  -Has been afebrile here  -Leukocytosis has downtrended and stable, possibly reactive  -Continue to monitor CBC and for recurrent fever    #Elevated lft  -reviewed US findings  -hep panel negative  -Limit tylenol and robaxin use  -avoid hepatoxic meds    #HTN  -Continue clonidine and metoprolol   -Monitor BP     #Post-Op Anemia  -H/H stable  -Monitor CBC    #GI PPx  -PPI    #DVT PPx  -Lovenox SC     
39 year old male with no pertinent PMHx originally presents to [facility] on 10/29/24 with c/o chest tightness and pain. Found to have aortic dissection beginning at the root of the aorta extending into the ascending, aortic arch, descending thoracic aorta as well as the abdominal aorta and ends in the distal left common iliac artery. The dissection flap is in close proximity to the right coronary artery. The left renal artery is arising from the false lumen., transferred to [facility] for Bio-bentall  with reconstruction of the RCA with Dr. Grimm on 10/29. Post op course c/b seizure and suspected CVA.  Now admitted to [facility] with left sided decificts  for initiation of a multidisciplinary rehab program consisting focused on functional mobility, transfers and ADLs.    #Aortic Dissection s/p BioBentall Procedure and Reconstruction of RCA 10/29  -WBAT, Sternal Precautions   -Continue aspirin  -Continue Lasix   -Continue metoprolol   -Pain control and bowel regimen per rehab team  -Comprehensive rehab program with PT/OT   -Outpatient follow up with CTS for further workup     #Suspected CVA  #Left Hemiparesis (Improved)  -Post-op left sided facial/arm weakness, dysarthria and left visual neglect  -CTH 10/31 showed no acute intracranial pathology. CTA Head and neck showed no flow limiting stenosis or vascular aneurysm or AVM. No venous venous thrombosis.   -Repeat CTH 11/6 showed no acute intracranial pathology  -Seen by neurology during his hospitalization, right frontoparietal stroke suspected, recommended MRI once safe from CTS perspective (to be done in 6 weeks given pacing wires).   -Fall precaution  -Comprehensive rehab program with PT/OT/SLP  -Outpatient follow up with neurology     #Post-Op Seizure  -s/p cEEG structural abnormal, but no events  -Outpatient MRI brain as above  -Continue keppra  -Seizure precaution  -Outpatient neurology for MRI as above     #Post Fever w/ Leukocytosis  -Infectious workup: UA negative (11/5), CXR negative (11/11), COVID negative, blood cultures 11/4 and 11/7 negative  -Has been afebrile here  -Leukocytosis has downtrended and stable, possibly reactive  -Continue to monitor CBC and for recurrent fever    #Elevated lft  -reviewed US findings  -hep panel negative  -Limit tylenol and robaxin use  -avoid hepatoxic meds    #HTN  -Continue clonidine and metoprolol   -Monitor BP     #Post-Op Anemia  -H/H stable  -Monitor CBC    #GI PPx  -PPI    #DVT PPx  -Lovenox SC     
Assessment/Plan:  Mr. Brennan Schneider is a 39-year-old male patient with no previous past medical history who is admitted for Acute Inpatient Rehabilitation with a multidisciplinary rehab program at [facility] with functional impairments in ADLs and mobility secondary to aortic dissection from the level of the root of the aorta extending into the ascending, aortic arch, descending thoracic aorta as well as extending into the abdominal aorta and was noted to end in the distal left common iliac artery S/P repair of type A aortic dissection by Bentall procedure with reconstruction of right coronary artery, dissection of aortic root, and aortic valve conduit by Dr. Grimm (10/29/24) with post-operative course remarkable for clinical suggestion of stroke with no radiographic confirmation.    #Dissection of ascending aorta and aortic arch s/p surgical repair  - CTA chest significant for Aortic dissection ([facility])      * Emergent transfer to [facility] OR for BioBental reconstruction RCA on 10/29 with Dr. Grimm   - S/P repair of type A aortic dissection by Bentall procedure with reconstruction of right coronary artery, dissection of aortic root, and aortic valve conduit by Dr. Grimm (10/29/24)      * Sternal precautions; WBAT       * Daily weights  - Prior EKG- NSR with PACs w/ LBB (11/5)       * Lasix 40mg daily       * Aspirin 81mg daily       * Metoprolol 50mg BID       * Pain management: Robaxin to prn, Tylenol hold-elevated LFTs  - Clinical suspicion from Neurology of a right frontoparietal stroke      * Deficits: Left hemiparesis, moderate cog deficits, delayed processing,  dysarthric , left inattention   - Comprehensive Multidisciplinary Rehab Program:      * 3 hours a day, 5 days a week      * PT 1hr/day: Focused on improving strength, endurance, coordination, balance, functional mobility, and transfers      * OT 1hr/day: Focused on improving strength, fine motor skills, coordination, posture and ADLs.        * Speech Therapy 1hr/day: to diagnose and treat deficits in swallowing, cognition and communication.   - Activity Limitations: Decreased social, vocational and leisure activities, decreased self care and ADLs, decreased mobility, decreased ability to manage household and finances.     -----------------------------------------------------------------------------------  Concurrent Medical Problems    #Recurrent Fever with leukocytosis  - Negative BCx 11/4, 11/7  - UA neg  - Afebrile overnight  - Continue to monitor temp and trend CBC    #Seizure  -Post op isolated seizure  - S/p cEEG structural abnormal, but no events  - CT Head no acute findings, repeat CTH negative   - Recommend f/u outpatient MRI brain- 6w post op  - Keppra 1000mg BID  - Seizure precautions     #Post HTN  - Clonidine 0.1mg TID  - Metoprolol 50mg BID  - Monitor BP     #Mood/Cognition:  Neuropsychology consult   - SLP eval    #GI/Bowel:  Senna QHS, Miralax PRN Daily  GI ppx: Pantoprazole 40mg daily     #/Bladder:   - PVR x1 on admission  - SC if > 400    #Skin/Pressure Injury:   - Skin assessment on admission: R anterior cervical neck wound 1x1cm, 25cm mid sternal incision , well approximated, with steri strips, 2 lap site under 1.5 cm each, R anterior shoulder incision 7cm, moisture associated dermatitis groin   - Nystatin BID    #Diet  Current Diet: Regular; DASH  Nutrition consult     #Precautions / PROPHYLAXIS:   - Lungs: Aspiration, Incentive Spirometer, deep coughing/breathing exercise  - DVT ppx: Lovenox 40mg daily , SCDs  - Pressure injury/Skin: OOB to Chair, PT/OT      ---------------  Code Status: FULL  Emergency Contact:    Outpatient Follow-up (Specialty/Name of physician):    Lazaro Grimm  Thoracic and Cardiac Surgery  [address] [place] [address]  Phone: [phone]  Fax: [phone]  Follow Up Time:     Keven Leach  Neurology  [address], [address]  [place] [address]  Phone: [phone]  Fax: [phone]  Follow Up Time: 1 month  
39 year old male with no pertinent PMHx originally presents to [facility] on 10/29/24 with c/o chest tightness and pain. Found to have aortic dissection beginning at the root of the aorta extending into the ascending, aortic arch, descending thoracic aorta as well as the abdominal aorta and ends in the distal left common iliac artery. The dissection flap is in close proximity to the right coronary artery. The left renal artery is arising from the false lumen., transferred to [facility] for Bio-bentall  with reconstruction of the RCA with Dr. Grimm on 10/29. Post op course c/b seizure and suspected CVA.  Now admitted to [facility] with left sided decificts  for initiation of a multidisciplinary rehab program consisting focused on functional mobility, transfers and ADLs.    #Aortic Dissection s/p BioBentall Procedure and Reconstruction of RCA 10/29  -WBAT, Sternal Precautions   -Continue aspirin  -Continue Lasix   -Continue metoprolol   -Pain control and bowel regimen per rehab team  -Comprehensive rehab program with PT/OT   -Outpatient follow up with CTS for further workup     #Suspected CVA  #Left Hemiparesis (Improved)  -Post-op left sided facial/arm weakness, dysarthria and left visual neglect  -CTH 10/31 showed no acute intracranial pathology. CTA Head and neck showed no flow limiting stenosis or vascular aneurysm or AVM. No venous venous thrombosis.   -Repeat CTH 11/6 showed no acute intracranial pathology  -Seen by neurology during his hospitalization, right frontoparietal stroke suspected, recommended MRI once safe from CTS perspective (to be done in 6 weeks given pacing wires).   -Fall precaution  -Comprehensive rehab program with PT/OT/SLP  -Outpatient follow up with neurology     #Post-Op Seizure  -s/p cEEG structural abnormal, but no events  -Continue keppra  -Seizure precaution  -Outpatient neurology for MRI as above     #Post Fever w/ Leukocytosis  -Infectious workup: UA negative (11/5), CXR negative (11/11), COVID negative, blood cultures 11/4 and 11/7 negative  -Has been afebrile here  -Leukocytosis continues to downtrend, possibly reactive  -Continue to monitor CBC and for recurrent fever    #Transaminitis (Downtrending)  -RUQ u/s Minimal/equivocally increased hepatic echotexture without focal   abnormality, raises the possibility of steatosis  -Acute hepatitis panel negative  -Limit tylenol and robaxin use  -Avoid hepatoxic meds  -Monitor BMP     #HTN  -Continue clonidine and metoprolol   -Continue lasix   -Monitor BP, overall controlled     #Post-Op Anemia  -H/H stable  -Monitor CBC    #GI PPx  -PPI    #DVT PPx  -Lovenox SC     Discussed with rehab team

## 2024-11-20 NOTE — PROGRESS NOTE ADULT - TIME BILLING
Time spent includes direct patient care (interview and examination of patient), discussion with other providers, support staff and/or patient's family members, review of medical records, ordering diagnostic tests and analyzing results, and documentation
Time spent includes direct patient care (interview and examination of patient), discussion with other providers, support staff and/or patient's family members, review of medical records, ordering diagnostic tests and analyzing results, and documentation

## 2024-11-20 NOTE — PROGRESS NOTE ADULT - REASON FOR ADMISSION
Dissection of ascending aorta and aortic arch s/p surgical repair
Dissection of ascending aorta and aortic arch s/p surgical repair
Dissection of ascending aorta and aortic arch s/p surgical repair, clinical CVA
Dissection of ascending aorta and aortic arch s/p surgical repair
Dissection of ascending aorta and aortic arch s/p surgical repair, clinical CVA
Dissection of ascending aorta and aortic arch s/p surgical repair
Dissection of ascending aorta and aortic arch s/p surgical repair
Dissection of ascending aorta and aortic arch s/p surgical repair, clinical CVA
Dissection of ascending aorta and aortic arch s/p surgical repair, clinical CVA
Dissection of ascending aorta and aortic arch s/p surgical repair
Dissection of ascending aorta and aortic arch s/p surgical repair, clinical CVA
Dissection of ascending aorta and aortic arch s/p surgical repair, clinical CVA

## 2024-11-21 ENCOUNTER — TRANSCRIPTION ENCOUNTER (OUTPATIENT)
Age: 39
End: 2024-11-21

## 2024-11-21 ENCOUNTER — APPOINTMENT (OUTPATIENT)
Dept: INTERNAL MEDICINE | Facility: CLINIC | Age: 39
End: 2024-11-21
Payer: COMMERCIAL

## 2024-11-21 VITALS
TEMPERATURE: 98.9 F | OXYGEN SATURATION: 96 % | DIASTOLIC BLOOD PRESSURE: 70 MMHG | BODY MASS INDEX: 32.49 KG/M2 | HEART RATE: 72 BPM | WEIGHT: 207 LBS | HEIGHT: 67 IN | SYSTOLIC BLOOD PRESSURE: 112 MMHG

## 2024-11-21 DIAGNOSIS — Z78.9 OTHER SPECIFIED HEALTH STATUS: ICD-10-CM

## 2024-11-21 DIAGNOSIS — R56.9 UNSPECIFIED CONVULSIONS: ICD-10-CM

## 2024-11-21 DIAGNOSIS — D72.829 ELEVATED WHITE BLOOD CELL COUNT, UNSPECIFIED: ICD-10-CM

## 2024-11-21 DIAGNOSIS — I71.010 DISSECTION OF ASCENDING AORTA: ICD-10-CM

## 2024-11-21 DIAGNOSIS — R79.89 OTHER SPECIFIED ABNORMAL FINDINGS OF BLOOD CHEMISTRY: ICD-10-CM

## 2024-11-21 DIAGNOSIS — Z00.00 ENCOUNTER FOR GENERAL ADULT MEDICAL EXAMINATION W/OUT ABNORMAL FINDINGS: ICD-10-CM

## 2024-11-21 PROCEDURE — 99215 OFFICE O/P EST HI 40 MIN: CPT

## 2024-11-21 PROCEDURE — G2211 COMPLEX E/M VISIT ADD ON: CPT | Mod: NC

## 2024-11-21 RX ORDER — PANTOPRAZOLE SODIUM 40 MG/1
40 GRANULE, DELAYED RELEASE ORAL
Refills: 0 | Status: ACTIVE | COMMUNITY

## 2024-11-21 RX ORDER — LEVETIRACETAM 1000 MG/1
1000 TABLET, FILM COATED ORAL TWICE DAILY
Refills: 0 | Status: ACTIVE | COMMUNITY

## 2024-11-21 RX ORDER — CLONIDINE HYDROCHLORIDE 0.1 MG/1
0.1 TABLET ORAL 3 TIMES DAILY
Refills: 0 | Status: ACTIVE | COMMUNITY

## 2024-11-21 RX ORDER — METOPROLOL TARTRATE 50 MG/1
50 TABLET ORAL TWICE DAILY
Refills: 0 | Status: ACTIVE | COMMUNITY

## 2024-11-21 RX ORDER — ASPIRIN 81 MG/1
81 TABLET, CHEWABLE ORAL
Refills: 0 | Status: ACTIVE | COMMUNITY

## 2024-11-22 ENCOUNTER — APPOINTMENT (OUTPATIENT)
Dept: CARE COORDINATION | Facility: HOME HEALTH | Age: 39
End: 2024-11-22
Payer: COMMERCIAL

## 2024-11-22 VITALS — HEIGHT: 67 IN | WEIGHT: 207 LBS | BODY MASS INDEX: 32.49 KG/M2

## 2024-11-22 LAB
25(OH)D3 SERPL-MCNC: 12 NG/ML
ALBUMIN SERPL ELPH-MCNC: 3.9 G/DL
ALP BLD-CCNC: 216 U/L
ALT SERPL-CCNC: 109 U/L
AST SERPL-CCNC: 31 U/L
BASOPHILS # BLD AUTO: 0.11 K/UL
BASOPHILS NFR BLD AUTO: 1 %
BILIRUB DIRECT SERPL-MCNC: 0.1 MG/DL
BILIRUB INDIRECT SERPL-MCNC: 0.3 MG/DL
BILIRUB SERPL-MCNC: 0.4 MG/DL
CHOLEST SERPL-MCNC: 215 MG/DL
EOSINOPHIL # BLD AUTO: 0.52 K/UL
EOSINOPHIL NFR BLD AUTO: 4.5 %
HCT VFR BLD CALC: 39.9 %
HDLC SERPL-MCNC: 28 MG/DL
HGB BLD-MCNC: 12.2 G/DL
IMM GRANULOCYTES NFR BLD AUTO: 1.4 %
LDLC SERPL CALC-MCNC: 156 MG/DL
LYMPHOCYTES # BLD AUTO: 2.14 K/UL
LYMPHOCYTES NFR BLD AUTO: 18.7 %
MAN DIFF?: NORMAL
MCHC RBC-ENTMCNC: 28 PG
MCHC RBC-ENTMCNC: 30.6 G/DL
MCV RBC AUTO: 91.7 FL
MONOCYTES # BLD AUTO: 1.01 K/UL
MONOCYTES NFR BLD AUTO: 8.8 %
NEUTROPHILS # BLD AUTO: 7.52 K/UL
NEUTROPHILS NFR BLD AUTO: 65.6 %
NONHDLC SERPL-MCNC: 187 MG/DL
PLATELET # BLD AUTO: 659 K/UL
PROT SERPL-MCNC: 7.6 G/DL
RBC # BLD: 4.35 M/UL
RBC # FLD: 14.5 %
TRIGL SERPL-MCNC: 166 MG/DL
VIT B12 SERPL-MCNC: 470 PG/ML
WBC # FLD AUTO: 11.46 K/UL

## 2024-11-22 PROCEDURE — 99024 POSTOP FOLLOW-UP VISIT: CPT

## 2024-11-26 ENCOUNTER — NON-APPOINTMENT (OUTPATIENT)
Age: 39
End: 2024-11-26

## 2024-11-26 DIAGNOSIS — E55.9 VITAMIN D DEFICIENCY, UNSPECIFIED: ICD-10-CM

## 2024-11-26 RX ORDER — ERGOCALCIFEROL 1.25 MG/1
1.25 MG CAPSULE ORAL
Qty: 8 | Refills: 0 | Status: ACTIVE | COMMUNITY
Start: 2024-11-26 | End: 1900-01-01

## 2024-11-27 ENCOUNTER — APPOINTMENT (OUTPATIENT)
Dept: CARDIOTHORACIC SURGERY | Facility: CLINIC | Age: 39
End: 2024-11-27
Payer: COMMERCIAL

## 2024-11-27 VITALS
OXYGEN SATURATION: 95 % | SYSTOLIC BLOOD PRESSURE: 120 MMHG | HEIGHT: 67 IN | BODY MASS INDEX: 32.49 KG/M2 | HEART RATE: 67 BPM | DIASTOLIC BLOOD PRESSURE: 64 MMHG | WEIGHT: 207 LBS | RESPIRATION RATE: 16 BRPM

## 2024-11-27 PROCEDURE — 99024 POSTOP FOLLOW-UP VISIT: CPT

## 2024-11-27 RX ORDER — FUROSEMIDE 40 MG/1
40 TABLET ORAL
Refills: 0 | Status: COMPLETED | COMMUNITY
End: 2024-11-27

## 2024-11-27 RX ORDER — POTASSIUM CHLORIDE 20 MEQ
20 TABLET, EXT RELEASE, PARTICLES/CRYSTALS ORAL
Refills: 0 | Status: COMPLETED | COMMUNITY
End: 2024-11-27

## 2024-12-02 ENCOUNTER — TRANSCRIPTION ENCOUNTER (OUTPATIENT)
Age: 39
End: 2024-12-02

## 2024-12-02 RX ORDER — CHOLECALCIFEROL (VITAMIN D3) 1250 MCG
1.25 MG CAPSULE ORAL
Qty: 12 | Refills: 0 | Status: ACTIVE | COMMUNITY
Start: 2024-12-02 | End: 1900-01-01

## 2024-12-03 ENCOUNTER — TRANSCRIPTION ENCOUNTER (OUTPATIENT)
Age: 39
End: 2024-12-03

## 2024-12-13 ENCOUNTER — APPOINTMENT (OUTPATIENT)
Dept: CARDIOLOGY | Facility: CLINIC | Age: 39
End: 2024-12-13
Payer: COMMERCIAL

## 2024-12-13 ENCOUNTER — NON-APPOINTMENT (OUTPATIENT)
Age: 39
End: 2024-12-13

## 2024-12-13 VITALS
HEIGHT: 67 IN | HEART RATE: 57 BPM | WEIGHT: 206 LBS | DIASTOLIC BLOOD PRESSURE: 72 MMHG | BODY MASS INDEX: 32.33 KG/M2 | SYSTOLIC BLOOD PRESSURE: 120 MMHG | OXYGEN SATURATION: 97 %

## 2024-12-13 DIAGNOSIS — I10 ESSENTIAL (PRIMARY) HYPERTENSION: ICD-10-CM

## 2024-12-13 DIAGNOSIS — I71.010 DISSECTION OF ASCENDING AORTA: ICD-10-CM

## 2024-12-13 DIAGNOSIS — Z95.3 PRESENCE OF XENOGENIC HEART VALVE: ICD-10-CM

## 2024-12-13 DIAGNOSIS — E78.5 HYPERLIPIDEMIA, UNSPECIFIED: ICD-10-CM

## 2024-12-13 PROCEDURE — 93000 ELECTROCARDIOGRAM COMPLETE: CPT

## 2024-12-13 PROCEDURE — 99204 OFFICE O/P NEW MOD 45 MIN: CPT | Mod: 25

## 2024-12-16 ENCOUNTER — TRANSCRIPTION ENCOUNTER (OUTPATIENT)
Age: 39
End: 2024-12-16

## 2024-12-16 ENCOUNTER — APPOINTMENT (OUTPATIENT)
Dept: NEUROLOGY | Facility: CLINIC | Age: 39
End: 2024-12-16
Payer: COMMERCIAL

## 2024-12-16 VITALS — DIASTOLIC BLOOD PRESSURE: 72 MMHG | SYSTOLIC BLOOD PRESSURE: 128 MMHG

## 2024-12-16 VITALS
WEIGHT: 205 LBS | HEART RATE: 55 BPM | BODY MASS INDEX: 32.18 KG/M2 | HEIGHT: 67 IN | DIASTOLIC BLOOD PRESSURE: 96 MMHG | SYSTOLIC BLOOD PRESSURE: 156 MMHG | OXYGEN SATURATION: 97 %

## 2024-12-16 DIAGNOSIS — Z82.49 FAMILY HISTORY OF ISCHEMIC HEART DISEASE AND OTHER DISEASES OF THE CIRCULATORY SYSTEM: ICD-10-CM

## 2024-12-16 DIAGNOSIS — R56.9 UNSPECIFIED CONVULSIONS: ICD-10-CM

## 2024-12-16 PROCEDURE — 99204 OFFICE O/P NEW MOD 45 MIN: CPT

## 2024-12-18 ENCOUNTER — NON-APPOINTMENT (OUTPATIENT)
Age: 39
End: 2024-12-18

## 2024-12-19 ENCOUNTER — TRANSCRIPTION ENCOUNTER (OUTPATIENT)
Age: 39
End: 2024-12-19

## 2024-12-19 PROBLEM — Z82.49 FAMILY HISTORY OF CARDIAC DISORDER: Status: ACTIVE | Noted: 2024-12-16

## 2024-12-19 PROBLEM — Z82.49 FAMILY HISTORY OF CEREBRAL ANEURYSM: Status: ACTIVE | Noted: 2024-12-16

## 2024-12-19 LAB
ALBUMIN SERPL ELPH-MCNC: 4.2 G/DL
ALP BLD-CCNC: 111 U/L
ALT SERPL-CCNC: 12 U/L
AST SERPL-CCNC: 11 U/L
BILIRUB DIRECT SERPL-MCNC: 0.1 MG/DL
BILIRUB INDIRECT SERPL-MCNC: 0.3 MG/DL
BILIRUB SERPL-MCNC: 0.4 MG/DL
ESTIMATED AVERAGE GLUCOSE: 111 MG/DL
HBA1C MFR BLD HPLC: 5.5 %
LEVETIRACETAM SERPL-MCNC: 16.4 UG/ML
MAGNESIUM SERPL-MCNC: 2.2 MG/DL
PROT SERPL-MCNC: 7.2 G/DL

## 2024-12-20 ENCOUNTER — APPOINTMENT (OUTPATIENT)
Dept: NEUROLOGY | Facility: CLINIC | Age: 39
End: 2024-12-20
Payer: COMMERCIAL

## 2024-12-20 PROCEDURE — 95816 EEG AWAKE AND DROWSY: CPT

## 2024-12-23 ENCOUNTER — APPOINTMENT (OUTPATIENT)
Dept: MRI IMAGING | Facility: CLINIC | Age: 39
End: 2024-12-23
Payer: COMMERCIAL

## 2024-12-23 ENCOUNTER — APPOINTMENT (OUTPATIENT)
Dept: NEUROLOGY | Facility: CLINIC | Age: 39
End: 2024-12-23
Payer: COMMERCIAL

## 2024-12-23 ENCOUNTER — TRANSCRIPTION ENCOUNTER (OUTPATIENT)
Age: 39
End: 2024-12-23

## 2024-12-23 ENCOUNTER — OUTPATIENT (OUTPATIENT)
Dept: OUTPATIENT SERVICES | Facility: HOSPITAL | Age: 39
LOS: 1 days | End: 2024-12-23
Payer: COMMERCIAL

## 2024-12-23 DIAGNOSIS — R56.9 UNSPECIFIED CONVULSIONS: ICD-10-CM

## 2024-12-23 PROCEDURE — 95719 EEG PHYS/QHP EA INCR W/O VID: CPT

## 2024-12-23 PROCEDURE — 95700 EEG CONT REC W/VID EEG TECH: CPT

## 2024-12-23 PROCEDURE — 70553 MRI BRAIN STEM W/O & W/DYE: CPT

## 2024-12-23 PROCEDURE — 95708 EEG WO VID EA 12-26HR UNMNTR: CPT

## 2024-12-23 PROCEDURE — 70553 MRI BRAIN STEM W/O & W/DYE: CPT | Mod: 26

## 2024-12-23 PROCEDURE — A9585: CPT

## 2024-12-27 ENCOUNTER — APPOINTMENT (OUTPATIENT)
Dept: CARDIOLOGY | Facility: CLINIC | Age: 39
End: 2024-12-27
Payer: COMMERCIAL

## 2024-12-27 PROCEDURE — 93306 TTE W/DOPPLER COMPLETE: CPT

## 2024-12-31 ENCOUNTER — TRANSCRIPTION ENCOUNTER (OUTPATIENT)
Age: 39
End: 2024-12-31

## 2024-12-31 RX ORDER — LEVETIRACETAM 750 MG/1
750 TABLET, FILM COATED ORAL
Qty: 60 | Refills: 5 | Status: ACTIVE | COMMUNITY
Start: 2024-12-31 | End: 1900-01-01

## 2024-12-31 RX ORDER — VALSARTAN 80 MG/1
80 TABLET, COATED ORAL DAILY
Qty: 30 | Refills: 0 | Status: ACTIVE | COMMUNITY
Start: 2024-12-31 | End: 1900-01-01

## 2025-01-02 ENCOUNTER — TRANSCRIPTION ENCOUNTER (OUTPATIENT)
Age: 40
End: 2025-01-02

## 2025-01-03 ENCOUNTER — TRANSCRIPTION ENCOUNTER (OUTPATIENT)
Age: 40
End: 2025-01-03

## 2025-01-03 ENCOUNTER — APPOINTMENT (OUTPATIENT)
Dept: INTERNAL MEDICINE | Facility: CLINIC | Age: 40
End: 2025-01-03
Payer: COMMERCIAL

## 2025-01-03 VITALS
DIASTOLIC BLOOD PRESSURE: 90 MMHG | SYSTOLIC BLOOD PRESSURE: 150 MMHG | BODY MASS INDEX: 30.92 KG/M2 | OXYGEN SATURATION: 99 % | TEMPERATURE: 98.4 F | WEIGHT: 197 LBS | HEIGHT: 67 IN | HEART RATE: 55 BPM

## 2025-01-03 VITALS — DIASTOLIC BLOOD PRESSURE: 82 MMHG | SYSTOLIC BLOOD PRESSURE: 128 MMHG

## 2025-01-03 DIAGNOSIS — I10 ESSENTIAL (PRIMARY) HYPERTENSION: ICD-10-CM

## 2025-01-03 DIAGNOSIS — R79.89 OTHER SPECIFIED ABNORMAL FINDINGS OF BLOOD CHEMISTRY: ICD-10-CM

## 2025-01-03 DIAGNOSIS — R56.9 UNSPECIFIED CONVULSIONS: ICD-10-CM

## 2025-01-03 DIAGNOSIS — E78.5 HYPERLIPIDEMIA, UNSPECIFIED: ICD-10-CM

## 2025-01-03 DIAGNOSIS — Z95.3 PRESENCE OF XENOGENIC HEART VALVE: ICD-10-CM

## 2025-01-03 PROCEDURE — G2211 COMPLEX E/M VISIT ADD ON: CPT | Mod: NC

## 2025-01-03 PROCEDURE — 99214 OFFICE O/P EST MOD 30 MIN: CPT

## 2025-01-15 ENCOUNTER — APPOINTMENT (OUTPATIENT)
Dept: NEUROLOGY | Facility: CLINIC | Age: 40
End: 2025-01-15
Payer: COMMERCIAL

## 2025-01-15 VITALS
SYSTOLIC BLOOD PRESSURE: 159 MMHG | HEART RATE: 61 BPM | WEIGHT: 196 LBS | BODY MASS INDEX: 30.76 KG/M2 | HEIGHT: 67 IN | OXYGEN SATURATION: 96 % | DIASTOLIC BLOOD PRESSURE: 99 MMHG

## 2025-01-15 DIAGNOSIS — R56.9 UNSPECIFIED CONVULSIONS: ICD-10-CM

## 2025-01-15 PROCEDURE — 99214 OFFICE O/P EST MOD 30 MIN: CPT

## 2025-01-16 ENCOUNTER — TRANSCRIPTION ENCOUNTER (OUTPATIENT)
Age: 40
End: 2025-01-16

## 2025-01-17 ENCOUNTER — TRANSCRIPTION ENCOUNTER (OUTPATIENT)
Age: 40
End: 2025-01-17

## 2025-01-21 PROBLEM — R56.9 SEIZURE-LIKE ACTIVITY: Status: ACTIVE | Noted: 2025-01-21

## 2025-01-24 ENCOUNTER — NON-APPOINTMENT (OUTPATIENT)
Age: 40
End: 2025-01-24

## 2025-01-24 ENCOUNTER — APPOINTMENT (OUTPATIENT)
Dept: CARDIOLOGY | Facility: CLINIC | Age: 40
End: 2025-01-24
Payer: COMMERCIAL

## 2025-01-24 VITALS
SYSTOLIC BLOOD PRESSURE: 142 MMHG | HEART RATE: 64 BPM | BODY MASS INDEX: 31.08 KG/M2 | WEIGHT: 198 LBS | HEIGHT: 67 IN | DIASTOLIC BLOOD PRESSURE: 80 MMHG | OXYGEN SATURATION: 98 %

## 2025-01-24 DIAGNOSIS — Z95.3 PRESENCE OF XENOGENIC HEART VALVE: ICD-10-CM

## 2025-01-24 DIAGNOSIS — I10 ESSENTIAL (PRIMARY) HYPERTENSION: ICD-10-CM

## 2025-01-24 DIAGNOSIS — E78.5 HYPERLIPIDEMIA, UNSPECIFIED: ICD-10-CM

## 2025-01-24 DIAGNOSIS — I71.010 DISSECTION OF ASCENDING AORTA: ICD-10-CM

## 2025-01-24 PROCEDURE — 93000 ELECTROCARDIOGRAM COMPLETE: CPT

## 2025-01-24 PROCEDURE — G2211 COMPLEX E/M VISIT ADD ON: CPT | Mod: NC

## 2025-01-24 PROCEDURE — 99214 OFFICE O/P EST MOD 30 MIN: CPT

## 2025-01-24 RX ORDER — LEVETIRACETAM 750 MG/1
750 TABLET, FILM COATED ORAL DAILY
Refills: 0 | Status: ACTIVE | COMMUNITY

## 2025-01-24 RX ORDER — LEVETIRACETAM 500 MG/1
500 TABLET, FILM COATED ORAL TWICE DAILY
Qty: 60 | Refills: 4 | Status: DISCONTINUED | COMMUNITY
Start: 2025-01-16 | End: 2025-01-24

## 2025-01-24 RX ORDER — CLONIDINE HYDROCHLORIDE 0.1 MG/1
0.1 TABLET ORAL TWICE DAILY
Refills: 0 | Status: ACTIVE | COMMUNITY

## 2025-01-24 RX ORDER — LEVETIRACETAM 500 MG/1
500 TABLET, FILM COATED ORAL DAILY
Refills: 0 | Status: ACTIVE | COMMUNITY

## 2025-01-28 ENCOUNTER — RX RENEWAL (OUTPATIENT)
Age: 40
End: 2025-01-28

## 2025-01-30 ENCOUNTER — TRANSCRIPTION ENCOUNTER (OUTPATIENT)
Age: 40
End: 2025-01-30

## 2025-02-06 ENCOUNTER — NON-APPOINTMENT (OUTPATIENT)
Age: 40
End: 2025-02-06

## 2025-02-07 ENCOUNTER — TRANSCRIPTION ENCOUNTER (OUTPATIENT)
Age: 40
End: 2025-02-07

## 2025-02-10 ENCOUNTER — APPOINTMENT (OUTPATIENT)
Dept: NEUROLOGY | Facility: CLINIC | Age: 40
End: 2025-02-10

## 2025-02-13 ENCOUNTER — APPOINTMENT (OUTPATIENT)
Dept: NEUROLOGY | Facility: CLINIC | Age: 40
End: 2025-02-13
Payer: COMMERCIAL

## 2025-02-13 DIAGNOSIS — R56.9 UNSPECIFIED CONVULSIONS: ICD-10-CM

## 2025-02-13 PROCEDURE — 99213 OFFICE O/P EST LOW 20 MIN: CPT | Mod: 93

## 2025-02-14 LAB
CHOLEST SERPL-MCNC: 177 MG/DL
HDLC SERPL-MCNC: 37 MG/DL
LDLC SERPL CALC-MCNC: 118 MG/DL
NONHDLC SERPL-MCNC: 140 MG/DL
TRIGL SERPL-MCNC: 124 MG/DL

## 2025-02-18 ENCOUNTER — TRANSCRIPTION ENCOUNTER (OUTPATIENT)
Age: 40
End: 2025-02-18

## 2025-03-05 ENCOUNTER — TRANSCRIPTION ENCOUNTER (OUTPATIENT)
Age: 40
End: 2025-03-05

## 2025-03-11 ENCOUNTER — TRANSCRIPTION ENCOUNTER (OUTPATIENT)
Age: 40
End: 2025-03-11

## 2025-03-11 ENCOUNTER — RX RENEWAL (OUTPATIENT)
Age: 40
End: 2025-03-11

## 2025-03-12 ENCOUNTER — TRANSCRIPTION ENCOUNTER (OUTPATIENT)
Age: 40
End: 2025-03-12

## 2025-03-14 ENCOUNTER — TRANSCRIPTION ENCOUNTER (OUTPATIENT)
Age: 40
End: 2025-03-14

## 2025-03-21 ENCOUNTER — TRANSCRIPTION ENCOUNTER (OUTPATIENT)
Age: 40
End: 2025-03-21

## 2025-03-24 ENCOUNTER — TRANSCRIPTION ENCOUNTER (OUTPATIENT)
Age: 40
End: 2025-03-24

## 2025-03-27 ENCOUNTER — TRANSCRIPTION ENCOUNTER (OUTPATIENT)
Age: 40
End: 2025-03-27

## 2025-04-02 ENCOUNTER — TRANSCRIPTION ENCOUNTER (OUTPATIENT)
Age: 40
End: 2025-04-02

## 2025-04-09 ENCOUNTER — TRANSCRIPTION ENCOUNTER (OUTPATIENT)
Age: 40
End: 2025-04-09

## 2025-04-11 ENCOUNTER — NON-APPOINTMENT (OUTPATIENT)
Age: 40
End: 2025-04-11

## 2025-04-18 ENCOUNTER — NON-APPOINTMENT (OUTPATIENT)
Age: 40
End: 2025-04-18

## 2025-04-18 ENCOUNTER — APPOINTMENT (OUTPATIENT)
Dept: CARDIOLOGY | Facility: CLINIC | Age: 40
End: 2025-04-18
Payer: COMMERCIAL

## 2025-04-18 VITALS
WEIGHT: 193 LBS | SYSTOLIC BLOOD PRESSURE: 132 MMHG | DIASTOLIC BLOOD PRESSURE: 86 MMHG | HEART RATE: 90 BPM | BODY MASS INDEX: 30.29 KG/M2 | OXYGEN SATURATION: 97 % | HEIGHT: 67 IN

## 2025-04-18 DIAGNOSIS — Z95.3 PRESENCE OF XENOGENIC HEART VALVE: ICD-10-CM

## 2025-04-18 DIAGNOSIS — I71.010 DISSECTION OF ASCENDING AORTA: ICD-10-CM

## 2025-04-18 DIAGNOSIS — I10 ESSENTIAL (PRIMARY) HYPERTENSION: ICD-10-CM

## 2025-04-18 PROCEDURE — 93000 ELECTROCARDIOGRAM COMPLETE: CPT

## 2025-04-18 PROCEDURE — 99214 OFFICE O/P EST MOD 30 MIN: CPT

## 2025-04-18 PROCEDURE — G2211 COMPLEX E/M VISIT ADD ON: CPT | Mod: NC

## 2025-04-18 RX ORDER — AMOXICILLIN 500 MG/1
500 CAPSULE ORAL
Qty: 4 | Refills: 1 | Status: ACTIVE | COMMUNITY
Start: 2025-04-18 | End: 1900-01-01

## 2025-04-21 ENCOUNTER — RX RENEWAL (OUTPATIENT)
Age: 40
End: 2025-04-21

## 2025-04-25 ENCOUNTER — APPOINTMENT (OUTPATIENT)
Dept: NEUROLOGY | Facility: CLINIC | Age: 40
End: 2025-04-25
Payer: COMMERCIAL

## 2025-04-25 PROCEDURE — 95816 EEG AWAKE AND DROWSY: CPT

## 2025-05-02 ENCOUNTER — TRANSCRIPTION ENCOUNTER (OUTPATIENT)
Age: 40
End: 2025-05-02

## 2025-05-14 ENCOUNTER — APPOINTMENT (OUTPATIENT)
Dept: NEUROLOGY | Facility: CLINIC | Age: 40
End: 2025-05-14
Payer: COMMERCIAL

## 2025-05-14 DIAGNOSIS — R56.9 UNSPECIFIED CONVULSIONS: ICD-10-CM

## 2025-05-14 PROCEDURE — 99212 OFFICE O/P EST SF 10 MIN: CPT | Mod: 95

## 2025-05-22 ENCOUNTER — APPOINTMENT (OUTPATIENT)
Dept: CARDIOLOGY | Facility: CLINIC | Age: 40
End: 2025-05-22
Payer: COMMERCIAL

## 2025-05-22 VITALS
HEIGHT: 67 IN | SYSTOLIC BLOOD PRESSURE: 138 MMHG | OXYGEN SATURATION: 96 % | WEIGHT: 198 LBS | DIASTOLIC BLOOD PRESSURE: 80 MMHG | HEART RATE: 61 BPM | BODY MASS INDEX: 31.08 KG/M2

## 2025-05-22 VITALS — SYSTOLIC BLOOD PRESSURE: 126 MMHG | DIASTOLIC BLOOD PRESSURE: 82 MMHG

## 2025-05-22 DIAGNOSIS — I10 ESSENTIAL (PRIMARY) HYPERTENSION: ICD-10-CM

## 2025-05-22 DIAGNOSIS — Z95.3 PRESENCE OF XENOGENIC HEART VALVE: ICD-10-CM

## 2025-05-22 DIAGNOSIS — I71.010 DISSECTION OF ASCENDING AORTA: ICD-10-CM

## 2025-05-22 PROCEDURE — G2211 COMPLEX E/M VISIT ADD ON: CPT | Mod: NC

## 2025-05-22 PROCEDURE — 99214 OFFICE O/P EST MOD 30 MIN: CPT

## 2025-05-30 ENCOUNTER — TRANSCRIPTION ENCOUNTER (OUTPATIENT)
Age: 40
End: 2025-05-30

## 2025-06-11 ENCOUNTER — APPOINTMENT (OUTPATIENT)
Dept: INTERNAL MEDICINE | Facility: CLINIC | Age: 40
End: 2025-06-11
Payer: COMMERCIAL

## 2025-06-11 VITALS
HEIGHT: 67 IN | WEIGHT: 195 LBS | DIASTOLIC BLOOD PRESSURE: 90 MMHG | OXYGEN SATURATION: 98 % | TEMPERATURE: 97.5 F | SYSTOLIC BLOOD PRESSURE: 150 MMHG | BODY MASS INDEX: 30.61 KG/M2 | HEART RATE: 58 BPM

## 2025-06-11 VITALS — DIASTOLIC BLOOD PRESSURE: 80 MMHG | SYSTOLIC BLOOD PRESSURE: 122 MMHG

## 2025-06-11 PROCEDURE — 99214 OFFICE O/P EST MOD 30 MIN: CPT

## 2025-06-11 PROCEDURE — G2211 COMPLEX E/M VISIT ADD ON: CPT | Mod: NC

## 2025-06-12 ENCOUNTER — APPOINTMENT (OUTPATIENT)
Dept: CT IMAGING | Facility: CLINIC | Age: 40
End: 2025-06-12
Payer: COMMERCIAL

## 2025-06-12 ENCOUNTER — OUTPATIENT (OUTPATIENT)
Dept: OUTPATIENT SERVICES | Facility: HOSPITAL | Age: 40
LOS: 1 days | End: 2025-06-12
Payer: COMMERCIAL

## 2025-06-12 DIAGNOSIS — I71.010 DISSECTION OF ASCENDING AORTA: ICD-10-CM

## 2025-06-12 DIAGNOSIS — Z95.3 PRESENCE OF XENOGENIC HEART VALVE: ICD-10-CM

## 2025-06-12 PROCEDURE — 71275 CT ANGIOGRAPHY CHEST: CPT

## 2025-06-12 PROCEDURE — 71275 CT ANGIOGRAPHY CHEST: CPT | Mod: 26

## 2025-06-14 ENCOUNTER — TRANSCRIPTION ENCOUNTER (OUTPATIENT)
Age: 40
End: 2025-06-14

## 2025-06-18 ENCOUNTER — TRANSCRIPTION ENCOUNTER (OUTPATIENT)
Age: 40
End: 2025-06-18

## 2025-06-24 ENCOUNTER — APPOINTMENT (OUTPATIENT)
Dept: CARDIOTHORACIC SURGERY | Facility: CLINIC | Age: 40
End: 2025-06-24
Payer: COMMERCIAL

## 2025-06-24 VITALS
HEART RATE: 57 BPM | TEMPERATURE: 98 F | SYSTOLIC BLOOD PRESSURE: 130 MMHG | RESPIRATION RATE: 16 BRPM | OXYGEN SATURATION: 97 % | WEIGHT: 193 LBS | BODY MASS INDEX: 30.29 KG/M2 | DIASTOLIC BLOOD PRESSURE: 76 MMHG | HEIGHT: 67 IN

## 2025-06-24 PROBLEM — Z82.49 FAMILY HISTORY OF AORTIC ANEURYSM: Status: ACTIVE | Noted: 2025-06-24

## 2025-06-24 PROCEDURE — 99214 OFFICE O/P EST MOD 30 MIN: CPT

## 2025-08-19 ENCOUNTER — TRANSCRIPTION ENCOUNTER (OUTPATIENT)
Age: 40
End: 2025-08-19

## (undated) DEVICE — DRAPE TOWEL BLUE 17" X 24"

## (undated) DEVICE — SUT ETHIBOND 2-0 30" V5 WHITE

## (undated) DEVICE — STOPCOCK 3 WAY W SWIVEL MALE LUER LOCK

## (undated) DEVICE — Device

## (undated) DEVICE — CONNECTOR STRAIGHT 3/8 X 3/8"

## (undated) DEVICE — GLV 7.5 PROTEXIS (WHITE)

## (undated) DEVICE — GOWN TRIMAX LG

## (undated) DEVICE — SWITCH ARISS TABLE MOUNT UNEQUAL LEGS 13"

## (undated) DEVICE — VISITEC 4X4

## (undated) DEVICE — SUT SILK 0 30" KS

## (undated) DEVICE — SUT PROLENE 3-0 36" SH

## (undated) DEVICE — CHEST DRAIN PLEUR-EVAC DRY/WET ADULT-PEDS SINGLE (QUICK)

## (undated) DEVICE — PRESSURE INFUSOR BAG 1000ML

## (undated) DEVICE — ELCTR BOVIE TIP BLADE MEGADYNE E-Z CLEAN 6.5" (LONG)

## (undated) DEVICE — SUT VICRYL 0 36" CTX UNDYED

## (undated) DEVICE — SOL BAG NS 0.9% 1000ML

## (undated) DEVICE — WARMING BLANKET DUO-THERM HYPER/HYPOTHERM ADULT

## (undated) DEVICE — TONGUE DEPRESSOR

## (undated) DEVICE — PACK VALVE

## (undated) DEVICE — DRSG MEPILEX 10 X 30CM (4 X 12") WHITE

## (undated) DEVICE — PAD NERVE PHRENIC NERVE

## (undated) DEVICE — LIVANOVA SMART PERFUSION TUBING 0.375X3/32" X 6FT

## (undated) DEVICE — DRAPE IOBAN 33" X 23"

## (undated) DEVICE — MEDTRONIC CLEARVIEW BLOWER MISTER KIT W TUBING SET

## (undated) DEVICE — VESSEL LOOP EXTRA MAXI-BLUE 0.200" X 22"

## (undated) DEVICE — DRSG TAPE TRANSPORE 3"

## (undated) DEVICE — SUMP INTRACARDIAC/PERICARDIAL 20FR 1/4" ADULT

## (undated) DEVICE — DRSG MEPILEX 10 X 25CM (4 X 10") AG

## (undated) DEVICE — SUT SILK 2-0 18" SH (POP-OFF)

## (undated) DEVICE — VESSEL LOOP MAXI-RED  0.120" X 16"

## (undated) DEVICE — URETERAL CATH RED RUBBER 18FR (RED)

## (undated) DEVICE — CONNECTOR "Y" 1/2 X 3/8 X 3/8"

## (undated) DEVICE — STEALTH CLAMP INSERT FIBRA/FIBRA 60MM

## (undated) DEVICE — PREP SCRUB BRUSH W CHG 4%

## (undated) DEVICE — ELCTR BOVIE TIP BLADE MEGADYNE E-Z CLEAN 2.5" (SHORT)

## (undated) DEVICE — PACK OPEN HEART VAMP PLUS

## (undated) DEVICE — WOUND IRR IRRISEPT W 0.5 CHG

## (undated) DEVICE — SUT PLEDGET 9MM X 4MM X 1.5MM

## (undated) DEVICE — VENTING ADAPTER "Y" (RED/BLUE) 7.5"

## (undated) DEVICE — SUT PROLENE 5-0 36" RB-1

## (undated) DEVICE — DRSG TEGADERM 4 X 4.75"

## (undated) DEVICE — SUT PERMAHAND SILK 2 60" TIES

## (undated) DEVICE — STAPLER SKIN PROXIMATE

## (undated) DEVICE — SOL INJ NS 0.9% 1000ML

## (undated) DEVICE — DRAPE SLUSH / WARMER 44 X 66"

## (undated) DEVICE — TOURNIQUET SET TOURNIKWIK 12FR (4 TUBES, 1 SNARE) 7.5"

## (undated) DEVICE — MULTIPLE PERFUSION SET FEMALE 1 INLET LEG W 4 LEGS 15" (BLUE/RED)

## (undated) DEVICE — SUT PROLENE 5-0 30" RB-2

## (undated) DEVICE — POSITIONER FOAM EGG CRATE ULNAR 2PCS (PINK)

## (undated) DEVICE — SUT SILK 0 30" SH

## (undated) DEVICE — SUT PROLENE 4-0 36" RB-1

## (undated) DEVICE — SOL IRR POUR NS 0.9% 1000ML

## (undated) DEVICE — DRSG OPSITE 13.75 X 4"

## (undated) DEVICE — ELCTR BOVIE BLADE 3/4" EXTENDED LENGTH 6"

## (undated) DEVICE — SOL IRR POUR H2O 1000ML

## (undated) DEVICE — PREP CHLORAPREP HI-LITE ORANGE 26ML

## (undated) DEVICE — SUT PDS II 2-0 27" CT-1

## (undated) DEVICE — SUT PROLENE 4-0 36" SH

## (undated) DEVICE — SPONGE PEANUT AUTO COUNT

## (undated) DEVICE — SUT VICRYL 2-0 27" CT-1 UNDYED

## (undated) DEVICE — LAP PAD 18 X 18"